# Patient Record
Sex: FEMALE | Race: WHITE | NOT HISPANIC OR LATINO | Employment: PART TIME | URBAN - METROPOLITAN AREA
[De-identification: names, ages, dates, MRNs, and addresses within clinical notes are randomized per-mention and may not be internally consistent; named-entity substitution may affect disease eponyms.]

---

## 2017-01-19 ENCOUNTER — HOSPITAL ENCOUNTER (OUTPATIENT)
Dept: LAB | Facility: MEDICAL CENTER | Age: 32
End: 2017-01-19
Attending: NURSE PRACTITIONER
Payer: COMMERCIAL

## 2017-01-19 LAB — GLUCOSE 1H P 50 G GLC PO SERPL-MCNC: 191 MG/DL (ref 70–139)

## 2017-01-19 PROCEDURE — 36415 COLL VENOUS BLD VENIPUNCTURE: CPT

## 2017-01-19 PROCEDURE — 82950 GLUCOSE TEST: CPT

## 2017-01-28 ENCOUNTER — APPOINTMENT (OUTPATIENT)
Dept: OTHER | Facility: IMAGING CENTER | Age: 32
End: 2017-01-28

## 2017-02-10 ENCOUNTER — NON-PROVIDER VISIT (OUTPATIENT)
Dept: HEALTH INFORMATION MANAGEMENT | Facility: MEDICAL CENTER | Age: 32
End: 2017-02-10
Payer: COMMERCIAL

## 2017-02-10 VITALS — WEIGHT: 199 LBS | HEIGHT: 62 IN | BODY MASS INDEX: 36.62 KG/M2

## 2017-02-10 DIAGNOSIS — O24.419 GESTATIONAL DIABETES MELLITUS (GDM) IN THIRD TRIMESTER, GESTATIONAL DIABETES METHOD OF CONTROL UNSPECIFIED: ICD-10-CM

## 2017-02-10 PROCEDURE — G0109 DIAB MANAGE TRN IND/GROUP: HCPCS | Performed by: PREVENTIVE MEDICINE

## 2017-02-10 NOTE — PROGRESS NOTES
Olinda came to the Gestational Diabetes Program.  She states she has an uncle with Type 2 Diabetes.  She was supplied a Free style Lite meter. She was able to do a return demonstration without difficulty. She will keep walking and stay well hydrated with water. Her meal plan is scanned into Media and her Doctor Letters with what was taught can be found under the Letters tab.

## 2017-02-10 NOTE — MR AVS SNAPSHOT
"        Olinda Pritchardetler   2/10/2017 1:00 PM   Non-Provider Visit   MRN: [id number]    Department:  [facility]   Dept Phone:  [phone]    Description:  Female : 1985   Provider:  GDM CLASS           Reason for Visit     Gestational Diabetes           Allergies as of 2/10/2017     No Known Allergies      You were diagnosed with     Gestational diabetes mellitus (GDM) in third trimester, gestational diabetes method of control unspecified   [[id number]]         Vital Signs     Height Weight Body Mass Index Smoking Status          1.575 m (5' 2\") 90.266 kg (199 lb) 36.39 kg/m2 Never Smoker         Basic Information     Date Of Birth Sex Race Ethnicity Preferred Language    1985 Female White Non- English      Health Maintenance        Date Due Completion Dates    IMM DTaP/Tdap/Td Vaccine (1 - Tdap) 2006 ---    PAP SMEAR 2008 ---    IMM INFLUENZA (1) 2016 ---            Current Immunizations     No immunizations on file.      Below and/or attached are the medications your provider expects you to take. Review all of your home medications and newly ordered medications with your provider and/or pharmacist. Follow medication instructions as directed by your provider and/or pharmacist. Please keep your medication list with you and share with your provider. Update the information when medications are discontinued, doses are changed, or new medications (including over-the-counter products) are added; and carry medication information at all times in the event of emergency situations     Allergies:  No Known Allergies          Medications  Valid as of: February 10, 2017 -  3:18 PM    Generic Name Brand Name Tablet Size Instructions for use    Fexofenadine HCl (Tab) ALLEGRA 60 MG Take 60 mg by mouth every day.        Pseudoephedrine-APAP-DM   Take  by mouth.        .                 Medicines prescribed today were sent to:     [id number] - [place], [place] - [address] AT [facility] " [address] [place] [address]    Phone: [phone] Fax: [phone]    Open 24 Hours?: No      Medication refill instructions:       If your prescription bottle indicates you have medication refills left, it is not necessary to call your provider’s office. Please contact your pharmacy and they will refill your medication.    If your prescription bottle indicates you do not have any refills left, you may request refills at any time through one of the following ways: The online M-Factor system (except Urgent Care), by calling your provider’s office, or by asking your pharmacy to contact your provider’s office with a refill request. Medication refills are processed only during regular business hours and may not be available until the next business day. Your provider may request additional information or to have a follow-up visit with you prior to refilling your medication.   *Please Note: Medication refills are assigned a new Rx number when refilled electronically. Your pharmacy may indicate that no refills were authorized even though a new prescription for the same medication is available at the pharmacy. Please request the medicine by name with the pharmacy before contacting your provider for a refill.           M-Factor Access Code: Activation code not generated  Current M-Factor Status: Active

## 2017-02-10 NOTE — Clinical Note
February 10, 2017                   Re: Olinda Trevino     1985         [id number]       Anna Tellez M.D.  [address]  [address]  [place] [address]      Dear :Anna Tellez M.D.    On 2/10/2017, your patient Olinda Trevino, received 1 hour of nurse training from the Diabetes Center at [facility] for management of her gestational diabetes.  Her  Estimated Date of Delivery: 4/28/17.  We taught the following subjects:    Introduction to gestational diabetes, benefits and responsibilities of patient, physiology of diabetes and the diease process, benefits of blood glucose monitoring and record keeping, medication action and possible side effects, hypoglycemia, sick day management, exercise, stress reduction and travel with diabetes.       Nurse assessment / Education:      Comments:  Edema:no      Weight:Weight: 90.266 kg (199 lb)         Complaints:no      Pathophysiology of diabetes in pregnancy    Discuss  potential maternal and fetal complications in pregnancy with diabetes.     Importance of blood glucose monitoring   Proper testing technique using a Abbott Freestyle Lite meter.    At 1:30 pm, the meter read 95, which was 2.5 hours after eating.  Testing: fasting and one hour after meals,  expected ranges and rationale for strict control.     Ketone test today:no       Recognition and treatment of hypoglycemia.     Insulin taught: No  Insulin briefly dicussed at this time.    Should patient require insulin later in pregnancy, she would need further education.     Reviewed fetal kick counts and other tests to determine fetal well-being  Discuss benefits and risks of exercise in pregnancy  Discuss when to call Doctor  Discuss sick day care  Importance of wearing diabetes identification      Patient/caregiver appeared to understand the content as demonstrated by appropriate questions.     Olinda Trevino was encouraged to discuss this further with you.    Hopefully this will help in  your management of her care.  If we can be of further assistance, please feel free to call.    Thank you for the referral.    Sincerely,      Camelia Carver RN CDE  GDM CLASS  Certified Diabetes Educator

## 2017-02-10 NOTE — Clinical Note
February 10, 2017                   Re: Olinda Trevino    1985         [id number]       Anna Tellez M.D.  [address]  [place] [address]      Dear :Anna Tellez M.D.    On 2/10/2017, your patient Olinda Trevino, received 1.5 hours of nutrition training from the Diabetes Center at [facility] for management of her gestational diabetes.  Her EDC is:  4/28/17 .  We taught the following subjects:    Patient provided 1800 calorie meal plan with 3 meals and 3 snacks.   175 grams carbohydrate,   115 grams protein,   75 grams fat  Importance of meal planning in diabetes management during pregnancy  Importance of consistent timing of meals and snacks and agreed upon times  Avoidance of simple carbohydrates  Metabolism of food components relating to pregnancy  Identification of foods in food groups  Patient demonstrates adequate ability to utilize meal planning manual for reference  Plan 3 meals and 3 snacks with 90% accuracy  Review basic principles of eating out  Reviewed precautions with artificial sweeteners  Comments:  Olinda agreed to follow the meal plan and to eat at the times agreed upon.  She will check blood glucose 4 times a day and record the values in her log book.  She will follow up in your office.    Hopefully this will help in your management of her care.  If we can be of further assistance, please feel free to call.    Thank you for the referral.    Sincerely,  Cathy Queen RD, CDE  Certified Diabetes Educator

## 2017-03-28 ENCOUNTER — HOSPITAL ENCOUNTER (OUTPATIENT)
Dept: LAB | Facility: MEDICAL CENTER | Age: 32
End: 2017-03-28
Attending: OBSTETRICS & GYNECOLOGY
Payer: COMMERCIAL

## 2017-03-28 PROCEDURE — 87653 STREP B DNA AMP PROBE: CPT

## 2017-03-30 LAB — GP B STREP DNA SPEC QL NAA+PROBE: NEGATIVE

## 2017-04-11 ENCOUNTER — HOSPITAL ENCOUNTER (OUTPATIENT)
Facility: MEDICAL CENTER | Age: 32
End: 2017-04-11
Attending: OBSTETRICS & GYNECOLOGY | Admitting: OBSTETRICS & GYNECOLOGY
Payer: COMMERCIAL

## 2017-04-11 VITALS
BODY MASS INDEX: 37.54 KG/M2 | TEMPERATURE: 97.8 F | HEART RATE: 93 BPM | DIASTOLIC BLOOD PRESSURE: 90 MMHG | WEIGHT: 204 LBS | HEIGHT: 62 IN | SYSTOLIC BLOOD PRESSURE: 131 MMHG | RESPIRATION RATE: 16 BRPM

## 2017-04-11 VITALS — HEART RATE: 112 BPM | SYSTOLIC BLOOD PRESSURE: 135 MMHG | DIASTOLIC BLOOD PRESSURE: 76 MMHG

## 2017-04-11 LAB
ALBUMIN SERPL BCP-MCNC: 3.6 G/DL (ref 3.2–4.9)
ALBUMIN/GLOB SERPL: 1.1 G/DL
ALP SERPL-CCNC: 106 U/L (ref 30–99)
ALT SERPL-CCNC: 6 U/L (ref 2–50)
ANION GAP SERPL CALC-SCNC: 10 MMOL/L (ref 0–11.9)
AST SERPL-CCNC: 7 U/L (ref 12–45)
BASOPHILS # BLD AUTO: 0.3 % (ref 0–1.8)
BASOPHILS # BLD: 0.03 K/UL (ref 0–0.12)
BILIRUB SERPL-MCNC: 0.3 MG/DL (ref 0.1–1.5)
BUN SERPL-MCNC: 10 MG/DL (ref 8–22)
CALCIUM SERPL-MCNC: 9.2 MG/DL (ref 8.5–10.5)
CHLORIDE SERPL-SCNC: 105 MMOL/L (ref 96–112)
CO2 SERPL-SCNC: 19 MMOL/L (ref 20–33)
CREAT SERPL-MCNC: 0.41 MG/DL (ref 0.5–1.4)
CREAT UR-MCNC: 46.6 MG/DL
EOSINOPHIL # BLD AUTO: 0.1 K/UL (ref 0–0.51)
EOSINOPHIL NFR BLD: 1 % (ref 0–6.9)
ERYTHROCYTE [DISTWIDTH] IN BLOOD BY AUTOMATED COUNT: 42.8 FL (ref 35.9–50)
GFR SERPL CREATININE-BSD FRML MDRD: >60 ML/MIN/1.73 M 2
GLOBULIN SER CALC-MCNC: 3.2 G/DL (ref 1.9–3.5)
GLUCOSE BLD-MCNC: 82 MG/DL (ref 65–99)
GLUCOSE SERPL-MCNC: 94 MG/DL (ref 65–99)
HCT VFR BLD AUTO: 38.2 % (ref 37–47)
HGB BLD-MCNC: 12.5 G/DL (ref 12–16)
IMM GRANULOCYTES # BLD AUTO: 0.09 K/UL (ref 0–0.11)
IMM GRANULOCYTES NFR BLD AUTO: 0.9 % (ref 0–0.9)
LYMPHOCYTES # BLD AUTO: 1.3 K/UL (ref 1–4.8)
LYMPHOCYTES NFR BLD: 13.6 % (ref 22–41)
MCH RBC QN AUTO: 27.8 PG (ref 27–33)
MCHC RBC AUTO-ENTMCNC: 32.7 G/DL (ref 33.6–35)
MCV RBC AUTO: 85.1 FL (ref 81.4–97.8)
MONOCYTES # BLD AUTO: 0.6 K/UL (ref 0–0.85)
MONOCYTES NFR BLD AUTO: 6.3 % (ref 0–13.4)
NEUTROPHILS # BLD AUTO: 7.45 K/UL (ref 2–7.15)
NEUTROPHILS NFR BLD: 77.9 % (ref 44–72)
NRBC # BLD AUTO: 0 K/UL
NRBC BLD AUTO-RTO: 0 /100 WBC
PLATELET # BLD AUTO: 296 K/UL (ref 164–446)
PMV BLD AUTO: 9.4 FL (ref 9–12.9)
POTASSIUM SERPL-SCNC: 3.8 MMOL/L (ref 3.6–5.5)
PROT SERPL-MCNC: 6.8 G/DL (ref 6–8.2)
PROT UR-MCNC: 30 MG/DL (ref 0–15)
PROT/CREAT UR: 644 MG/G (ref 10–107)
RBC # BLD AUTO: 4.49 M/UL (ref 4.2–5.4)
SODIUM SERPL-SCNC: 134 MMOL/L (ref 135–145)
URATE SERPL-MCNC: 6.3 MG/DL (ref 1.9–8.2)
WBC # BLD AUTO: 9.6 K/UL (ref 4.8–10.8)

## 2017-04-11 PROCEDURE — 59025 FETAL NON-STRESS TEST: CPT | Performed by: OBSTETRICS & GYNECOLOGY

## 2017-04-11 PROCEDURE — 36415 COLL VENOUS BLD VENIPUNCTURE: CPT

## 2017-04-11 PROCEDURE — 80053 COMPREHEN METABOLIC PANEL: CPT

## 2017-04-11 PROCEDURE — 85025 COMPLETE CBC W/AUTO DIFF WBC: CPT

## 2017-04-11 PROCEDURE — 81050 URINALYSIS VOLUME MEASURE: CPT

## 2017-04-11 PROCEDURE — 82962 GLUCOSE BLOOD TEST: CPT

## 2017-04-11 PROCEDURE — 84156 ASSAY OF PROTEIN URINE: CPT | Mod: 91

## 2017-04-11 PROCEDURE — 82570 ASSAY OF URINE CREATININE: CPT

## 2017-04-11 PROCEDURE — 84550 ASSAY OF BLOOD/URIC ACID: CPT

## 2017-04-11 ASSESSMENT — PAIN SCALES - GENERAL
PAINLEVEL_OUTOF10: 5
PAINLEVEL_OUTOF10: 5

## 2017-04-11 NOTE — PROGRESS NOTES
31 y.o. , EDC 17=37w4d here to L&D for PIH workup and blood glucose test.     1130- Pt to S220, external monitors applied, /84, blood drawn by lab. POC discussed. Sister-in-law and mother at bedside. Reports some pain with ctx, every 10 minutes, some dizziness. Denies epigastric pain. Some swelling of hands and feet.    1200- Reactive NST obtained.     1330- SVE closed, see flowsheets.    1348- Call to Dr. Tellez, updated on pt's lab results, total protein 30 and protein/creatinine ration 644. Orders to discharge home with 24 hour urine collection.     1415- D/c orders discussed with pt and family including s/s labor, fetal kick counts, and preeclampsia precautions. Follow up with scheduled apt tomorrow with Dr. Laird. Return tomorrow at 1240 with urine collection and printed lab order. Pt expresses understanding. D/c ambulatory off unit.

## 2017-04-12 PROCEDURE — 84156 ASSAY OF PROTEIN URINE: CPT

## 2017-04-12 PROCEDURE — 81050 URINALYSIS VOLUME MEASURE: CPT

## 2017-04-12 NOTE — PROGRESS NOTES
2050  Pt into room 217 c/o having increased UC pain since she was here earlier today.  Pt has report of discharge from prior exams coming out, has no report of leaking water though.  Pt reports positive fetal movement.  Monitors placed and SVE.  POC discussed and will recheck cervix in one hour.  2150  Repeat SVE with no cervical change, POC discussed and Dr. Stein phoned at this time.  2230  Return phone from Dr. Stein and orders for DC at this time.  24 Hour urine started again at 2030 since pt states that she messed up the one at home from earlier.  Pt understands to return the urine after 2030 tomorrow.  Pt into regular clothing and to home at this time.

## 2017-04-13 ENCOUNTER — HOSPITAL ENCOUNTER (INPATIENT)
Facility: MEDICAL CENTER | Age: 32
LOS: 2 days | End: 2017-04-15
Attending: OBSTETRICS & GYNECOLOGY | Admitting: OBSTETRICS & GYNECOLOGY
Payer: COMMERCIAL

## 2017-04-13 LAB
ALBUMIN SERPL BCP-MCNC: 3.5 G/DL (ref 3.2–4.9)
ALBUMIN/GLOB SERPL: 1 G/DL
ALP SERPL-CCNC: 116 U/L (ref 30–99)
ALT SERPL-CCNC: 6 U/L (ref 2–50)
ANION GAP SERPL CALC-SCNC: 10 MMOL/L (ref 0–11.9)
AST SERPL-CCNC: 13 U/L (ref 12–45)
BASOPHILS # BLD AUTO: 0.5 % (ref 0–1.8)
BASOPHILS # BLD: 0.04 K/UL (ref 0–0.12)
BILIRUB SERPL-MCNC: 0.3 MG/DL (ref 0.1–1.5)
BUN SERPL-MCNC: 10 MG/DL (ref 8–22)
CALCIUM SERPL-MCNC: 9.1 MG/DL (ref 8.5–10.5)
CHLORIDE SERPL-SCNC: 106 MMOL/L (ref 96–112)
CO2 SERPL-SCNC: 18 MMOL/L (ref 20–33)
CREAT SERPL-MCNC: 0.42 MG/DL (ref 0.5–1.4)
EOSINOPHIL # BLD AUTO: 0.07 K/UL (ref 0–0.51)
EOSINOPHIL NFR BLD: 0.8 % (ref 0–6.9)
ERYTHROCYTE [DISTWIDTH] IN BLOOD BY AUTOMATED COUNT: 43.8 FL (ref 35.9–50)
GFR SERPL CREATININE-BSD FRML MDRD: >60 ML/MIN/1.73 M 2
GLOBULIN SER CALC-MCNC: 3.4 G/DL (ref 1.9–3.5)
GLUCOSE SERPL-MCNC: 68 MG/DL (ref 65–99)
HCT VFR BLD AUTO: 37.4 % (ref 37–47)
HGB BLD-MCNC: 12.6 G/DL (ref 12–16)
HOLDING TUBE BB 8507: NORMAL
IMM GRANULOCYTES # BLD AUTO: 0.07 K/UL (ref 0–0.11)
IMM GRANULOCYTES NFR BLD AUTO: 0.8 % (ref 0–0.9)
LYMPHOCYTES # BLD AUTO: 1.39 K/UL (ref 1–4.8)
LYMPHOCYTES NFR BLD: 16.1 % (ref 22–41)
MCH RBC QN AUTO: 28.5 PG (ref 27–33)
MCHC RBC AUTO-ENTMCNC: 33.7 G/DL (ref 33.6–35)
MCV RBC AUTO: 84.6 FL (ref 81.4–97.8)
MONOCYTES # BLD AUTO: 0.67 K/UL (ref 0–0.85)
MONOCYTES NFR BLD AUTO: 7.8 % (ref 0–13.4)
NEUTROPHILS # BLD AUTO: 6.37 K/UL (ref 2–7.15)
NEUTROPHILS NFR BLD: 74 % (ref 44–72)
NRBC # BLD AUTO: 0 K/UL
NRBC BLD AUTO-RTO: 0 /100 WBC
PLATELET # BLD AUTO: 320 K/UL (ref 164–446)
PMV BLD AUTO: 9.7 FL (ref 9–12.9)
POTASSIUM SERPL-SCNC: 3.8 MMOL/L (ref 3.6–5.5)
PROT 24H UR-MCNC: 553 MG/24 HR (ref 30–150)
PROT 24H UR-MRATE: 31.6 MG/DL (ref 0–15)
PROT SERPL-MCNC: 6.9 G/DL (ref 6–8.2)
RBC # BLD AUTO: 4.42 M/UL (ref 4.2–5.4)
SODIUM SERPL-SCNC: 134 MMOL/L (ref 135–145)
SPECIMEN VOL UR: 1750 ML
URATE SERPL-MCNC: 5.9 MG/DL (ref 1.9–8.2)
WBC # BLD AUTO: 8.6 K/UL (ref 4.8–10.8)

## 2017-04-13 PROCEDURE — 85025 COMPLETE CBC W/AUTO DIFF WBC: CPT

## 2017-04-13 PROCEDURE — 700111 HCHG RX REV CODE 636 W/ 250 OVERRIDE (IP): Performed by: OBSTETRICS & GYNECOLOGY

## 2017-04-13 PROCEDURE — 4A1H74Z MONITORING OF PRODUCTS OF CONCEPTION, CARDIAC ELECTRICAL ACTIVITY, VIA NATURAL OR ARTIFICIAL OPENING: ICD-10-PCS | Performed by: OBSTETRICS & GYNECOLOGY

## 2017-04-13 PROCEDURE — 700111 HCHG RX REV CODE 636 W/ 250 OVERRIDE (IP)

## 2017-04-13 PROCEDURE — 84550 ASSAY OF BLOOD/URIC ACID: CPT

## 2017-04-13 PROCEDURE — 10H07YZ INSERTION OF OTHER DEVICE INTO PRODUCTS OF CONCEPTION, VIA NATURAL OR ARTIFICIAL OPENING: ICD-10-PCS | Performed by: OBSTETRICS & GYNECOLOGY

## 2017-04-13 PROCEDURE — 770002 HCHG ROOM/CARE - OB PRIVATE (112)

## 2017-04-13 PROCEDURE — 10907ZC DRAINAGE OF AMNIOTIC FLUID, THERAPEUTIC FROM PRODUCTS OF CONCEPTION, VIA NATURAL OR ARTIFICIAL OPENING: ICD-10-PCS | Performed by: OBSTETRICS & GYNECOLOGY

## 2017-04-13 PROCEDURE — 80053 COMPREHEN METABOLIC PANEL: CPT

## 2017-04-13 RX ORDER — SODIUM CHLORIDE, SODIUM LACTATE, POTASSIUM CHLORIDE, CALCIUM CHLORIDE 600; 310; 30; 20 MG/100ML; MG/100ML; MG/100ML; MG/100ML
INJECTION, SOLUTION INTRAVENOUS
Status: COMPLETED
Start: 2017-04-13 | End: 2017-04-13

## 2017-04-13 RX ORDER — LORATADINE 10 MG/1
10 TABLET ORAL DAILY
Status: ON HOLD | COMMUNITY
End: 2017-04-15

## 2017-04-13 RX ORDER — MISOPROSTOL 200 UG/1
800 TABLET ORAL
Status: DISCONTINUED | OUTPATIENT
Start: 2017-04-13 | End: 2017-04-14 | Stop reason: HOSPADM

## 2017-04-13 RX ORDER — SODIUM CHLORIDE, SODIUM LACTATE, POTASSIUM CHLORIDE, CALCIUM CHLORIDE 600; 310; 30; 20 MG/100ML; MG/100ML; MG/100ML; MG/100ML
INJECTION, SOLUTION INTRAVENOUS CONTINUOUS
Status: DISPENSED | OUTPATIENT
Start: 2017-04-13 | End: 2017-04-14

## 2017-04-13 RX ADMIN — SODIUM CHLORIDE, POTASSIUM CHLORIDE, SODIUM LACTATE AND CALCIUM CHLORIDE: 600; 310; 30; 20 INJECTION, SOLUTION INTRAVENOUS at 22:25

## 2017-04-13 RX ADMIN — SODIUM CHLORIDE, POTASSIUM CHLORIDE, SODIUM LACTATE AND CALCIUM CHLORIDE: 600; 310; 30; 20 INJECTION, SOLUTION INTRAVENOUS at 15:00

## 2017-04-13 ASSESSMENT — LIFESTYLE VARIABLES
ALCOHOL_USE: NO
DO YOU DRINK ALCOHOL: NO
EVER_SMOKED: NEVER

## 2017-04-13 ASSESSMENT — PAIN SCALES - GENERAL
PAINLEVEL_OUTOF10: 5
PAINLEVEL_OUTOF10: 5

## 2017-04-13 NOTE — PROGRESS NOTES
1415- Pt sent over from Dr. Laird's office for IOL per the patient, monitors placed, bp taken.  Dr. Tellez called, order given to admit and she will be by around 1600 to decide what type of induction is needed. Sve 1/50/-2  1500- IV placed, admit complete  1755-Dr. Tellez at bedside Cervical ripening balloon placed 60 ml in internal and external balloon  1900- Bedside report given to Janey SOTO- poc discussed

## 2017-04-13 NOTE — IP AVS SNAPSHOT
4/15/2017    Olinda MCGILL Belinda  [address] [place] [address]    Dear Olinda:    [facility] wants to ensure your discharge home is safe and you or your loved ones have had all of your questions answered regarding your care after you leave the hospital.    Below is a list of resources and contact information should you have any questions regarding your hospital stay, follow-up instructions, or active medical symptoms.    Questions or Concerns Regarding… Contact   Medical Questions Related to Your Discharge  (7 days a week, 8am-5pm) Contact a Nurse Care Coordinator   [phone]   Medical Questions Not Related to Your Discharge  (24 hours a day / 7 days a week)  Contact the Nurse Health Line   [phone]    Medications or Discharge Instructions Refer to your discharge packet   or contact your [facility] Primary Care Provider   [phone]   Follow-up Appointment(s) Schedule your appointment via Procyrion   or contact Scheduling [phone]   Billing Review your statement via Procyrion  or contact Billing [phone]   Medical Records Review your records via Procyrion   or contact Medical Records [phone]     You may receive a telephone call within two days of discharge. This call is to make certain you understand your discharge instructions and have the opportunity to have any questions answered. You can also easily access your medical information, test results and upcoming appointments via the Procyrion free online health management tool. You can learn more and sign up at [employer]/Procyrion. For assistance setting up your Procyrion account, please call [phone].    Once again, we want to ensure your discharge home is safe and that you have a clear understanding of any next steps in your care. If you have any questions or concerns, please do not hesitate to contact us, we are here for you. Thank you for choosing [facility] for your healthcare needs.    Sincerely,    Your [facility] Healthcare Team

## 2017-04-13 NOTE — IP AVS SNAPSHOT
Home Care Instructions                                                                                                                Olinda Trevino   MRN: [id number]    Department:  POST PARTUM 31   2017           Follow-up Information     1. Follow up with Anna Tellez M.D.. Schedule an appointment as soon as possible for a visit in 5 weeks.    Specialty:  OB/Gyn    Contact information    [address]  [address]  [place] [address]  [phone]         I assume responsibility for securing a follow-up  Screening blood test on my baby within the specified date range.    -                  Discharge Instructions       POSTPARTUM DISCHARGE INSTRUCTIONS FOR MOM    YOB: 1985   Age: 31 y.o.               Admit Date: 2017     Discharge Date: 4/15/2017  Attending Doctor:  Anna Tellez M.D.                  Allergies:  Cefdinir    Discharged to home by car. Discharged via wheelchair, hospital escort: Yes.  Special equipment needed: Not Applicable  Belongings with: Personal  Be sure to schedule a follow-up appointment with your primary care doctor or any specialists as instructed.     Discharge Plan:   Diet Plan: Discussed  Activity Level: Discussed  Confirmed Follow up Appointment: Patient to Call and Schedule Appointment  Confirmed Symptoms Management: Discussed  Medication Reconciliation Updated: Yes  Influenza Vaccine Indication: Not indicated: Previously immunized this influenza season and > 8 years of age    REASONS TO CALL YOUR OBSTETRICIAN:  1.   Persistent fever or shaking chills (Temperature higher than 100.4)  2.   Heavy bleeding (soaking more than 1 pad per hour); Passing clots  3.   Foul odor from vagina  4.   Mastitis (Breast infection; breast pain, chills, fever, redness)  5.   Urinary pain, burning or frequency  6.   Episiotomy infection    8.   Severe depression longer than 24 hours    HAND WASHING  · Prior to handling the baby.  · Before breastfeeding or bottle  "feeding baby.  · After using the bathroom or changing the baby's diaper.    VAGINAL CARE  · Nothing inside vagina for 6 weeks: no sexual intercourse, tampons or douching.  · Bleeding may continue for 2-4 weeks.  Amount may vary.    · Call your physician for heavy bleeding which means soaking more than 1 pad per hour    BIRTH CONTROL  · It is possible to become pregnant at any time after delivery and while breastfeeding.  · Plan to discuss a method of birth control with your physician at your follow up visit. visit.    DIET AND ELIMINATION  · Eating more fiber (bran cereal, fruits, and vegetables) and drinking plenty of fluids will help to avoid constipation.  · Urinary frequency after childbirth is normal.    POSTPARTUM BLUES  During the first few days after birth, you may experience a sense of the \"blues\" which may include impatience, irritability or even crying.  These feeling come and go quickly.  However, as many as 1 in 10 women experience emotional symptoms known as postpartum depression.    Postpartum depression:  May start as early as the second or third day after delivery or take several weeks or months to develop.  Symptoms of \"blues\" are present, but are more intense:  Crying spells; loss of appetite; feelings of hopelessness or loss of control; fear of touching the baby; over concern or no concern at all about the baby; little or no concern about your own appearance/caring for yourself; and/or inability to sleep or excessive sleeping.  Contact your physician if you are experiencing any of these symptoms.    Crisis Hotline:  · [place] Crisis Hotline:  7-854-BEVQBUH  Or 1-146.142.5200  · [place] Crisis Hotline:  1-982.610.9133  Or 044-065-6943    PREVENTING SHAKEN BABY:  If you are angry or stressed, PUT THE BABY IN THE CRIB, step away, take some deep breaths, and wait until you are calm to care for the baby.  DO NOT SHAKE THE BABY.  You are not alone, call a supporter for help.    · Crisis Call Center " "24/7 crisis line 300-217-5865 or 1-598.937.1982  · You can also text them, text \"ANSWER\" to 332283    QUIT SMOKING/TOBACCO USE:  I understand the use of any tobacco products increases my chance of suffering from future heart disease and could cause other illnesses which may shorten my life. Quitting the use of tobacco products is the single most important thing I can do to improve my health. For further information on smoking / tobacco cessation call a Toll Free Quit Line at 1-342.553.9446 (*National Cancer [place]) or 1-907.154.7402 (American Lung Association) or you can access the web based program at www.lungusa.org.    · [place] Tobacco Users Help Line:  (296) 668-7305       Toll Free: 1-214.290.1860  · Quit Tobacco Program [facility] (765)413-2633    DEPRESSION / SUICIDE RISK:  As you are discharged from this [facility] [[email or web address]], it is important to learn how to keep safe from harming yourself.    Recognize the warning signs:  · Abrupt changes in personality, positive or negative- including increase in energy   · Giving away possessions  · Change in eating patterns- significant weight changes-  positive or negative  · Change in sleeping patterns- unable to sleep or sleeping all the time   · Unwillingness or inability to communicate  · Depression  · Unusual sadness, discouragement and loneliness  · Talk of wanting to die  · Neglect of personal appearance   · Rebelliousness- reckless behavior  · Withdrawal from people/activities they love  · Confusion- inability to concentrate     If you or a loved one observes any of these behaviors or has concerns about self-harm, here's what you can do:  · Talk about it- your feelings and reasons for harming yourself  · Remove any means that you might use to hurt yourself (examples: pills, rope, extension cords, firearm)  · Get professional help from the community (Mental Health, Substance Abuse, psychological counseling)  · Do not be alone:Call your " Safe Contact- someone whom you trust who will be there for you.  · Call your local CRISIS HOTLINE 720-3331 or 164-669-2453  · Call your local Children's Mobile Crisis Response Team [place] (613) 826-3192 or www.Exosite  · Call the toll free National Suicide Prevention Hotlines   · National Suicide Prevention Lifeline 013-034-IPKL (9058)  · National Hope Line Network 800-SUICIDE (029-6600)    DISCHARGE SURVEY:  Thank you for choosing [facility].  We hope we provided you with very good care.  You may be receiving a survey in the mail.  Please fill it out.  Your opinion is valuable to us.    ADDITIONAL EDUCATIONAL MATERIALS GIVEN TO PATIENT:        My signature on this form indicates that:  1.  I have reviewed and understand the above information  2.  My questions regarding this information have been answered to my satisfaction.  3.  I have formulated a plan with my discharge nurse to obtain my prescribed medication for home.         Discharge Medication Instructions:    Below are the medications your physician expects you to take upon discharge:    Review all your home medications and newly ordered medications with your doctor and/or pharmacist. Follow medication instructions as directed by your doctor and/or pharmacist.    Please keep your medication list with you and share with your physician.               Medication List      START taking these medications        Instructions    ibuprofen 600 MG Tabs   Last time this was given:  600 mg on 4/15/2017  9:30 PM   Commonly known as:  MOTRIN    Take 1 Tab by mouth every 6 hours as needed (cramping after delivery; do not give if patient is receiving ketorolac (Toradol)).   Dose:  600 mg       oxycodone-acetaminophen 5-325 MG Tabs   Last time this was given:  1 Tab on 4/15/2017  9:30 PM   Commonly known as:  PERCOCET    Take 1 Tab by mouth every four hours as needed (for Moderate Pain (Pain Scale 4-6) after delivery).   Dose:  1 Tab         CONTINUE  taking these medications        Instructions    PRENATAL 1 PO    Take  by mouth.         STOP taking these medications     loratadine 10 MG Tabs   Commonly known as:  CLARITIN               Crisis Hotline:     [place] Crisis Hotline:  6-325-QZKFKRW or 1-409.674.1224    [place] Crisis Hotline:    1-466.324.2506 or 976-297-1030        Disclaimer           _____________________________________                     __________       ________       Patient/Mother Signature or Legal                          Date                   Time

## 2017-04-13 NOTE — IP AVS SNAPSHOT
Quantance Access Code: Activation code not generated  Current Quantance Status: Active    [facility]  A secure, online tool to manage your health information     [employer]’s Quantance® is a secure, online tool that connects you to your personalized health information from the privacy of your home -- day or night - making it very easy for you to manage your healthcare. Once the activation process is completed, you can even access your medical information using the Quantance denilson, which is available for free in the Apple Denilson store or Google Play store.     Quantance provides the following levels of access (as shown below):   My Chart Features   [facility] Primary Care Doctor [facility]  Specialists [facility]  Urgent  Care Non-[facility]  Primary Care  Doctor   Email your healthcare team securely and privately 24/7 X X X X   Manage appointments: schedule your next appointment; view details of past/upcoming appointments X      Request prescription refills. X      View recent personal medical records, including lab and immunizations X X X X   View health record, including health history, allergies, medications X X X X   Read reports about your outpatient visits, procedures, consult and ER notes X X X X   See your discharge summary, which is a recap of your hospital and/or ER visit that includes your diagnosis, lab results, and care plan. X X       How to register for Quantance:  1. Go to  [email or web address].  2. Click on the Sign Up Now box, which takes you to the New Member Sign Up page. You will need to provide the following information:  a. Enter your Quantance Access Code exactly as it appears at the top of this page. (You will not need to use this code after you’ve completed the sign-up process. If you do not sign up before the expiration date, you must request a new code.)   b. Enter your date of birth.   c. Enter your home email address.   d. Click Submit, and follow the next screen’s instructions.  3. Create a Quantance ID. This will  be your [employer] login ID and cannot be changed, so think of one that is secure and easy to remember.  4. Create a [employer] password. You can change your password at any time.  5. Enter your Password Reset Question and Answer. This can be used at a later time if you forget your password.   6. Enter your e-mail address. This allows you to receive e-mail notifications when new information is available in [employer].  7. Click Sign Up. You can now view your health information.    For assistance activating your [employer] account, call [phone]

## 2017-04-14 LAB
GLUCOSE BLD-MCNC: 152 MG/DL (ref 65–99)
GLUCOSE BLD-MCNC: 82 MG/DL (ref 65–99)

## 2017-04-14 PROCEDURE — 36415 COLL VENOUS BLD VENIPUNCTURE: CPT

## 2017-04-14 PROCEDURE — 700111 HCHG RX REV CODE 636 W/ 250 OVERRIDE (IP): Performed by: OBSTETRICS & GYNECOLOGY

## 2017-04-14 PROCEDURE — 303615 HCHG EPIDURAL/SPINAL ANESTHESIA FOR LABOR

## 2017-04-14 PROCEDURE — 304965 HCHG RECOVERY SERVICES

## 2017-04-14 PROCEDURE — A9270 NON-COVERED ITEM OR SERVICE: HCPCS | Performed by: OBSTETRICS & GYNECOLOGY

## 2017-04-14 PROCEDURE — 700111 HCHG RX REV CODE 636 W/ 250 OVERRIDE (IP)

## 2017-04-14 PROCEDURE — 82962 GLUCOSE BLOOD TEST: CPT

## 2017-04-14 PROCEDURE — 770002 HCHG ROOM/CARE - OB PRIVATE (112)

## 2017-04-14 PROCEDURE — 700101 HCHG RX REV CODE 250

## 2017-04-14 PROCEDURE — 0HQ9XZZ REPAIR PERINEUM SKIN, EXTERNAL APPROACH: ICD-10-PCS | Performed by: OBSTETRICS & GYNECOLOGY

## 2017-04-14 PROCEDURE — 700102 HCHG RX REV CODE 250 W/ 637 OVERRIDE(OP): Performed by: OBSTETRICS & GYNECOLOGY

## 2017-04-14 PROCEDURE — 59409 OBSTETRICAL CARE: CPT

## 2017-04-14 RX ORDER — SODIUM CHLORIDE, SODIUM LACTATE, POTASSIUM CHLORIDE, CALCIUM CHLORIDE 600; 310; 30; 20 MG/100ML; MG/100ML; MG/100ML; MG/100ML
INJECTION, SOLUTION INTRAVENOUS PRN
Status: DISCONTINUED | OUTPATIENT
Start: 2017-04-14 | End: 2017-04-16 | Stop reason: HOSPADM

## 2017-04-14 RX ORDER — LIDOCAINE HCL/EPINEPHRINE/PF 2%-1:200K
VIAL (ML) INJECTION
Status: ACTIVE
Start: 2017-04-14 | End: 2017-04-14

## 2017-04-14 RX ORDER — MISOPROSTOL 200 UG/1
600 TABLET ORAL
Status: DISCONTINUED | OUTPATIENT
Start: 2017-04-14 | End: 2017-04-16 | Stop reason: HOSPADM

## 2017-04-14 RX ORDER — SODIUM CHLORIDE, SODIUM LACTATE, POTASSIUM CHLORIDE, CALCIUM CHLORIDE 600; 310; 30; 20 MG/100ML; MG/100ML; MG/100ML; MG/100ML
INJECTION, SOLUTION INTRAVENOUS
Status: ACTIVE
Start: 2017-04-14 | End: 2017-04-14

## 2017-04-14 RX ORDER — DEXTROSE, SODIUM CHLORIDE, SODIUM LACTATE, POTASSIUM CHLORIDE, AND CALCIUM CHLORIDE 5; .6; .31; .03; .02 G/100ML; G/100ML; G/100ML; G/100ML; G/100ML
INJECTION, SOLUTION INTRAVENOUS CONTINUOUS
Status: DISCONTINUED | OUTPATIENT
Start: 2017-04-14 | End: 2017-04-16 | Stop reason: HOSPADM

## 2017-04-14 RX ORDER — ONDANSETRON 2 MG/ML
4 INJECTION INTRAMUSCULAR; INTRAVENOUS EVERY 4 HOURS PRN
Status: DISCONTINUED | OUTPATIENT
Start: 2017-04-14 | End: 2017-04-16 | Stop reason: HOSPADM

## 2017-04-14 RX ORDER — ROPIVACAINE HYDROCHLORIDE 2 MG/ML
INJECTION, SOLUTION EPIDURAL; INFILTRATION; PERINEURAL
Status: COMPLETED
Start: 2017-04-14 | End: 2017-04-14

## 2017-04-14 RX ORDER — OXYCODONE AND ACETAMINOPHEN 10; 325 MG/1; MG/1
1 TABLET ORAL EVERY 4 HOURS PRN
Status: DISCONTINUED | OUTPATIENT
Start: 2017-04-14 | End: 2017-04-16 | Stop reason: HOSPADM

## 2017-04-14 RX ORDER — IBUPROFEN 600 MG/1
600 TABLET ORAL EVERY 6 HOURS PRN
Status: DISCONTINUED | OUTPATIENT
Start: 2017-04-14 | End: 2017-04-16 | Stop reason: HOSPADM

## 2017-04-14 RX ORDER — OXYCODONE AND ACETAMINOPHEN 10; 325 MG/1; MG/1
1 TABLET ORAL EVERY 4 HOURS PRN
Status: DISCONTINUED | OUTPATIENT
Start: 2017-04-14 | End: 2017-04-14

## 2017-04-14 RX ORDER — SODIUM CHLORIDE, SODIUM LACTATE, POTASSIUM CHLORIDE, CALCIUM CHLORIDE 600; 310; 30; 20 MG/100ML; MG/100ML; MG/100ML; MG/100ML
INJECTION, SOLUTION INTRAVENOUS
Status: COMPLETED
Start: 2017-04-14 | End: 2017-04-14

## 2017-04-14 RX ORDER — VITAMIN A ACETATE, BETA CAROTENE, ASCORBIC ACID, CHOLECALCIFEROL, .ALPHA.-TOCOPHEROL ACETATE, DL-, THIAMINE MONONITRATE, RIBOFLAVIN, NIACINAMIDE, PYRIDOXINE HYDROCHLORIDE, FOLIC ACID, CYANOCOBALAMIN, CALCIUM CARBONATE, FERROUS FUMARATE, ZINC OXIDE, CUPRIC OXIDE 3080; 12; 120; 400; 1; 1.84; 3; 20; 22; 920; 25; 200; 27; 10; 2 [IU]/1; UG/1; MG/1; [IU]/1; MG/1; MG/1; MG/1; MG/1; MG/1; [IU]/1; MG/1; MG/1; MG/1; MG/1; MG/1
1 TABLET, FILM COATED ORAL EVERY MORNING
Status: DISCONTINUED | OUTPATIENT
Start: 2017-04-15 | End: 2017-04-16 | Stop reason: HOSPADM

## 2017-04-14 RX ORDER — OXYCODONE HYDROCHLORIDE AND ACETAMINOPHEN 5; 325 MG/1; MG/1
1 TABLET ORAL EVERY 4 HOURS PRN
Status: DISCONTINUED | OUTPATIENT
Start: 2017-04-14 | End: 2017-04-16 | Stop reason: HOSPADM

## 2017-04-14 RX ORDER — DOCUSATE SODIUM 100 MG/1
100 CAPSULE, LIQUID FILLED ORAL 2 TIMES DAILY PRN
Status: DISCONTINUED | OUTPATIENT
Start: 2017-04-14 | End: 2017-04-16 | Stop reason: HOSPADM

## 2017-04-14 RX ORDER — SODIUM CHLORIDE, SODIUM LACTATE, POTASSIUM CHLORIDE, CALCIUM CHLORIDE 600; 310; 30; 20 MG/100ML; MG/100ML; MG/100ML; MG/100ML
INJECTION, SOLUTION INTRAVENOUS
Status: ACTIVE
Start: 2017-04-14 | End: 2017-04-15

## 2017-04-14 RX ORDER — IBUPROFEN 600 MG/1
600 TABLET ORAL EVERY 6 HOURS PRN
Status: DISCONTINUED | OUTPATIENT
Start: 2017-04-14 | End: 2017-04-14

## 2017-04-14 RX ADMIN — IBUPROFEN 600 MG: 600 TABLET, FILM COATED ORAL at 19:17

## 2017-04-14 RX ADMIN — Medication 41.67 MILLI-UNITS/MIN: at 19:40

## 2017-04-14 RX ADMIN — FENTANYL CITRATE 50 MCG: 50 INJECTION INTRAMUSCULAR; INTRAVENOUS at 09:05

## 2017-04-14 RX ADMIN — OXYCODONE HYDROCHLORIDE AND ACETAMINOPHEN 1 TABLET: 5; 325 TABLET ORAL at 19:18

## 2017-04-14 RX ADMIN — Medication 2 MILLI-UNITS/MIN: at 03:58

## 2017-04-14 RX ADMIN — SODIUM CHLORIDE, POTASSIUM CHLORIDE, SODIUM LACTATE AND CALCIUM CHLORIDE 1000 ML: 600; 310; 30; 20 INJECTION, SOLUTION INTRAVENOUS at 14:28

## 2017-04-14 RX ADMIN — SODIUM CHLORIDE, POTASSIUM CHLORIDE, SODIUM LACTATE AND CALCIUM CHLORIDE 1000 ML: 600; 310; 30; 20 INJECTION, SOLUTION INTRAVENOUS at 14:29

## 2017-04-14 RX ADMIN — ROPIVACAINE HYDROCHLORIDE 200 MG: 2 INJECTION, SOLUTION EPIDURAL; INFILTRATION at 11:11

## 2017-04-14 RX ADMIN — ONDANSETRON 4 MG: 2 INJECTION INTRAMUSCULAR; INTRAVENOUS at 11:57

## 2017-04-14 RX ADMIN — FENTANYL CITRATE 50 MCG: 50 INJECTION INTRAMUSCULAR; INTRAVENOUS at 03:59

## 2017-04-14 RX ADMIN — FENTANYL CITRATE 50 MCG: 50 INJECTION INTRAMUSCULAR; INTRAVENOUS at 05:12

## 2017-04-14 RX ADMIN — FENTANYL CITRATE 50 MCG: 50 INJECTION INTRAMUSCULAR; INTRAVENOUS at 07:51

## 2017-04-14 ASSESSMENT — PAIN SCALES - GENERAL: PAINLEVEL_OUTOF10: 3

## 2017-04-14 NOTE — PROGRESS NOTES
"31 y.o. , EDC =38w0d here to L&D for IOL for proteinuria and elevated BP. Pt has GDM diet controlled.     0700- Bedside report received from Janey real RN. VSS,  sister-in-law and FOB at bedside. POC discussed.     110- Dr. Matute to bedside for placement of epidural.     1140- SVE little change, , call to Dr. Persaud, updated on pt status.     1200- Late decelerations noted, /59, previously 145/74 10 minutes earlier. Pt turned onto left side, fluids opened.     1210- Dr. Renee at bedside, updated on pt status b/c [facility]aud at [facility].     1620- Dr. Persaud at bedside, SVE no change, labor on.     1700- Pt feeling a lot of pressure and urge to bear down with ctx. SVE pt complete/+1. Call to Dr. Persaud.     1730- Start pushing.     -  of viable male, \"Carlos\" apgars 8/9.     1850- Placenta adherent, able to express placenta with pushing and fundal rubs.     - Bedside report given to Katrina real RN.   "

## 2017-04-14 NOTE — PROGRESS NOTES
comf with epidural  Rn checked pt 45 min ago and was 5-6/80/-2  EFM-cat1 most of the time, some periods of cat 2  Cont with iol for pih and gdm.

## 2017-04-14 NOTE — CARE PLAN
Problem: Knowledge Deficit  Goal: Patient/Support person demonstrates understanding regarding the progression of labor, available options and participates in decision-making process  Outcome: PROGRESSING AS EXPECTED

## 2017-04-14 NOTE — PROGRESS NOTES
Pt with mild CTXs  Balloon fell out  Cx 4cm per RN  Pitocin started  FHTs reassuring  Dr Persaud will assume care    awestfall

## 2017-04-14 NOTE — PROGRESS NOTES
1900- Received report from YADY Madrid RN. Patient resting in bed with no needs at this time.     2200- Patient states she feels uncomfortable and is having back pain. Patient repositioned. Discussed pain med options, patient declines at this time.     0000- Patient standing at bedside with mother providing counter pressure.     0300- Patient calls out. Balloon fell out while she was on tiolet. SVE as noted.     0350- Dr. Tellez called with updates. Orders to start pitocin. See MAR.    0500- Patient tolerating pitocin well. Fentanyl given per MAR.     0700- Report to KYLAH Garibay RN. POC discussed.

## 2017-04-14 NOTE — H&P
IDENTIFICATION:  This is a 31-year-old  female,  1, para 0,   EDC is 2017.  Estimated gestational age 37 and 6/7.    CHIEF COMPLAINT:  Sent directly from Dr. Salud Laird, maternal fetal   medicine's office for direct admission for pregnancy-induced hypertension and   induction of labor.    HISTORY OF PRESENT ILLNESS:  This patient has prenatal care at my office and   it really has been uncomplicated.  Earlier this week, she was feeling very   swollen and uncomfortable.  She was sent here to labor and delivery for blood   pressure monitoring.  Her blood pressures stabilized _____/70s.  [facility] labs were   normal with the exception of uric acid slightly elevated at 6.3.  She was   sent home.  She saw Dr. Laird today for monitoring, as she does have   gestational diabetes, diet controlled only, checking blood sugars every other   day.  Dr. Laird stated her blood pressures were elevated there.  She   reviewed her labs from earlier this week and thought induction was necessary.    Therefore, she sent her for induction of labor.    OBSTETRICAL HISTORY:  Primigravid patient.    GYNECOLOGIC HISTORY:  Benign.  No history of herpes simplex virus, oral or   genital, patient or .    PAST MEDICAL HISTORY:  Illnesses:  Asthma.    PAST SURGICAL HISTORY:  None.    ALLERGIES:  CEFTIN CAUSES A RASH.    CURRENT MEDICATIONS:  Prenatal vitamins.    SOCIAL HISTORY:  She denies alcohol, tobacco or drug use.  She is .    Her 's name is Waylon.    FAMILY MEDICAL HISTORY:  Noncontributory at this time.    REVIEW OF SYSTEMS:  She denies headache, epigastric pain, right upper quadrant   pain, visual changes or nondependent edema.  She reports active fetal   movement.  No vaginal bleeding or ruptured membranes.    PHYSICAL EXAMINATION:  VITAL SIGNS:  She is afebrile, blood pressures are 120s/70s.  LUNGS:  Clear to auscultation bilaterally.  HEART:  Regular rate and rhythm.  ABDOMEN:  Gravid and  nontender.  Fetal heart tracing is reactive, reassuring,   category 1.  Tocometer shows no regular uterine contractions.  PELVIC:  Cervix is 150 -2.  I was able to place a cervical ripening balloon   through the cervix into the uterus filled with 60 mL of fluid, the vaginal   portion of the balloon is infused with 60 mL of fluid.  EXTREMITIES:  Show 1+ edema.    DISCUSSION:  Thorough discussion with patient and family at this time   concerning Dr. Laird's recommendation for induction of labor secondary to   mild pregnancy-induced hypertension.  Blood pressure is under great control.    At this time, we will go ahead and induce labor with the cervical ripening   balloon.  The patient understands the steps with this.  She also understands   the increased risk of  delivery with induction of labor and accepts   these risks.    ASSESSMENT:  1.  Intrauterine pregnancy at 37 and 6/7.  2.  Mild pregnancy-induced hypertension.    PLAN:  The patient is admitted for labor induction, pain management in   anticipation of delivery.       ____________________________________     MD JUAN SCHROEDER / LIBAN    DD:  2017 18:08:28  DT:  2017 20:44:18    D#:  [id number]  Job#:  [id number]    cc: LALO DUNCAN MD, MARIELENA LAIRD MD

## 2017-04-15 VITALS
HEART RATE: 117 BPM | BODY MASS INDEX: 37.54 KG/M2 | RESPIRATION RATE: 20 BRPM | DIASTOLIC BLOOD PRESSURE: 80 MMHG | HEIGHT: 62 IN | SYSTOLIC BLOOD PRESSURE: 131 MMHG | TEMPERATURE: 98 F | OXYGEN SATURATION: 96 % | WEIGHT: 204 LBS

## 2017-04-15 LAB
ERYTHROCYTE [DISTWIDTH] IN BLOOD BY AUTOMATED COUNT: 44.4 FL (ref 35.9–50)
HCT VFR BLD AUTO: 31.8 % (ref 37–47)
HGB BLD-MCNC: 10.5 G/DL (ref 12–16)
MCH RBC QN AUTO: 28.6 PG (ref 27–33)
MCHC RBC AUTO-ENTMCNC: 33 G/DL (ref 33.6–35)
MCV RBC AUTO: 86.6 FL (ref 81.4–97.8)
PLATELET # BLD AUTO: 281 K/UL (ref 164–446)
PMV BLD AUTO: 9.6 FL (ref 9–12.9)
RBC # BLD AUTO: 3.67 M/UL (ref 4.2–5.4)
WBC # BLD AUTO: 18.7 K/UL (ref 4.8–10.8)

## 2017-04-15 PROCEDURE — A9270 NON-COVERED ITEM OR SERVICE: HCPCS | Performed by: OBSTETRICS & GYNECOLOGY

## 2017-04-15 PROCEDURE — 90715 TDAP VACCINE 7 YRS/> IM: CPT

## 2017-04-15 PROCEDURE — 700112 HCHG RX REV CODE 229

## 2017-04-15 PROCEDURE — 700102 HCHG RX REV CODE 250 W/ 637 OVERRIDE(OP): Performed by: OBSTETRICS & GYNECOLOGY

## 2017-04-15 PROCEDURE — 85027 COMPLETE CBC AUTOMATED: CPT

## 2017-04-15 PROCEDURE — 3E0234Z INTRODUCTION OF SERUM, TOXOID AND VACCINE INTO MUSCLE, PERCUTANEOUS APPROACH: ICD-10-PCS | Performed by: OBSTETRICS & GYNECOLOGY

## 2017-04-15 PROCEDURE — 700112 HCHG RX REV CODE 229: Performed by: OBSTETRICS & GYNECOLOGY

## 2017-04-15 PROCEDURE — 90471 IMMUNIZATION ADMIN: CPT

## 2017-04-15 PROCEDURE — 36415 COLL VENOUS BLD VENIPUNCTURE: CPT

## 2017-04-15 RX ORDER — IBUPROFEN 600 MG/1
600 TABLET ORAL EVERY 6 HOURS PRN
Qty: 30 TAB | Refills: 1 | Status: SHIPPED | OUTPATIENT
Start: 2017-04-15 | End: 2019-05-08

## 2017-04-15 RX ORDER — OXYCODONE HYDROCHLORIDE AND ACETAMINOPHEN 5; 325 MG/1; MG/1
1 TABLET ORAL EVERY 4 HOURS PRN
Qty: 15 TAB | Refills: 0 | Status: SHIPPED | OUTPATIENT
Start: 2017-04-15 | End: 2019-05-08

## 2017-04-15 RX ADMIN — OXYCODONE HYDROCHLORIDE AND ACETAMINOPHEN 1 TABLET: 5; 325 TABLET ORAL at 21:30

## 2017-04-15 RX ADMIN — IBUPROFEN 600 MG: 600 TABLET, FILM COATED ORAL at 01:57

## 2017-04-15 RX ADMIN — OXYCODONE HYDROCHLORIDE AND ACETAMINOPHEN 1 TABLET: 5; 325 TABLET ORAL at 15:38

## 2017-04-15 RX ADMIN — IBUPROFEN 600 MG: 600 TABLET, FILM COATED ORAL at 21:30

## 2017-04-15 RX ADMIN — TETANUS TOXOID, REDUCED DIPHTHERIA TOXOID AND ACELLULAR PERTUSSIS VACCINE, ADSORBED 0.5 ML: 5; 2.5; 8; 8; 2.5 SUSPENSION INTRAMUSCULAR at 21:29

## 2017-04-15 RX ADMIN — OXYCODONE HYDROCHLORIDE AND ACETAMINOPHEN 1 TABLET: 5; 325 TABLET ORAL at 09:18

## 2017-04-15 RX ADMIN — IBUPROFEN 600 MG: 600 TABLET, FILM COATED ORAL at 09:19

## 2017-04-15 RX ADMIN — IBUPROFEN 600 MG: 600 TABLET, FILM COATED ORAL at 15:38

## 2017-04-15 RX ADMIN — Medication 1 TABLET: at 09:19

## 2017-04-15 RX ADMIN — DOCUSATE SODIUM 100 MG: 100 CAPSULE ORAL at 01:57

## 2017-04-15 ASSESSMENT — PAIN SCALES - GENERAL
PAINLEVEL_OUTOF10: 5
PAINLEVEL_OUTOF10: 5
PAINLEVEL_OUTOF10: 4
PAINLEVEL_OUTOF10: 4
PAINLEVEL_OUTOF10: 6
PAINLEVEL_OUTOF10: 5
PAINLEVEL_OUTOF10: 3

## 2017-04-15 NOTE — PROGRESS NOTES
Report received at 0700. Assessment completed: Tachycardic- MD aware. Patient ambulates by self. Fundus firm, lochia scant rubra. Patient states pain 3/10; declines medication. Patient would like medication offered when available. Bed in lowest locked position. Call light in place. All questions and concerns addressed. Will continue to monitor.

## 2017-04-15 NOTE — PROGRESS NOTES
Report received from BRITTANY Herndon. Assessment done, Vitals stable, patient oriented to room & skylight. Call light placed within reach, patient educated in regards to safety of baby and herself. Patient instructed to call before ambulating. All questions answered.

## 2017-04-15 NOTE — CARE PLAN
Problem: Communication  Goal: The ability to communicate needs accurately and effectively will improve  Outcome: PROGRESSING AS EXPECTED  Pt. Taking adequate PO fluids, voiding in toilet, does not want to breastfeed, Requested formula, education done in regards to formula feeding and parents verbalized understanding.     Problem: Pain Management  Goal: Pain level will decrease to patient’s comfort goal  Outcome: PROGRESSING AS EXPECTED  Pt. Would like to take PRN pain meds as scheduled but not when sleeping.

## 2017-04-15 NOTE — PROGRESS NOTES
Hospital Day : 2    S: doing well; desired dc today if baby dc'd    O:  Filed Vitals:    17 1921 17 2031 04/15/17 0000 04/15/17 0400   BP: 150/84 127/81 118/77 103/64   Pulse: 117 115 111 113   Temp:  36.6 °C (97.9 °F) 36.6 °C (97.8 °F) 36.6 °C (97.9 °F)   TempSrc:       Resp:     Height:       Weight:       SpO2:  93% 92% 93%       Recent Labs      17   1500  04/15/17   0304   WBC  8.6  18.7*   RBC  4.42  3.67*   HEMOGLOBIN  12.6  10.5*   HEMATOCRIT  37.4  31.8*   MCV  84.6  86.6   MCH  28.5  28.6   MCHC  33.7  33.0*   RDW  43.8  44.4   PLATELETCT  320  281   MPV  9.7  9.6     Recent Labs      17   1500   SODIUM  134*   POTASSIUM  3.8   CHLORIDE  106   CO2  18*   GLUCOSE  68   BUN  10   CREATININE  0.42*   CALCIUM  9.1         abd soft ff    A: pp 1 sp ; mild pih with good bp control; gdm    P: dc if baby dc'd

## 2017-04-15 NOTE — DELIVERY NOTE
DATE OF SERVICE:  2017    ADMITTING DIAGNOSES:  1.  Pregnancy at 37-6/7 weeks.  2.  Mild PIH.  3.  Diet-controlled gestational diabetes.    HOSPITAL COURSE IN DETAIL:  This is a 31-year-old  1, para 0 with an   EDC of 2017, who was admitted at 37-6/7 with mild PIH for induction of   labor.  She was a known gestational diabetic with good blood sugar control.    She was subsequently admitted.  Cervix was 150 and -2.  A balloon was placed.    The balloon fell out spontaneously.  In the morning, she was 4 cm.  She was   subsequently AROM clear and IUPC was placed.  She received an epidural for   pain control and was started on Pitocin, progressed over normal labor curve to   complete with an overall reassuring fetal heart tracing.  At complete, she   was able to push the baby down to a +3 station, extend the baby's head and   deliver atraumatically.  The nares and mouth were bulb suctioned.  There was   no nuchal cord.  The shoulders and body followed without complication.  The   cord was clamped and cut.  Cord gases were collected, but were not valid.  The   infant was handed off to awaiting nursing team.  At this point, after a long   delay, the placenta delivered intact with a 3-vessel cord.  The uterus firmed   up nicely after 20 units of Pitocin.  Cervix was intact.  There was a small   first-degree midline laceration, which was repaired under local with 3-0   Monocryl running locking above the hymenal ring.  Estimated blood loss was 300   mL.  The patient and baby to recovery room in stable condition.    FINDINGS:  Include a male infant with Apgars of 8 and 9.  There were no   complications.       ____________________________________     MD CRISTINA Laird / LIBAN    DD:  2017 19:06:24  DT:  2017 22:37:35    D#:  [id number]  Job#:  [id number]

## 2017-04-15 NOTE — PROGRESS NOTES
1900: Received report from KYLAH Garibay RN. POC discussed and pt verbalizes understanding.    2035: Pt up to the bathroom. Tolerated well. Ambulated to wheelchair. Transferred to PPU in stable condition via wheelchair.    2040: Report given to DARCY Madden RN. POC discussed. Bands verified. No further concerns at this time.

## 2017-04-16 NOTE — CARE PLAN
Problem: Altered physiologic condition related to immediate post-delivery state and potential for bleeding/hemorrhage  Goal: Patient physiologically stable as evidenced by normal lochia, palpable uterine involution and vital signs within normal limits  Outcome: PROGRESSING AS EXPECTED  Fundal massage per protocol.     Problem: Potential for postpartum infection related to presence of episiotomy/vaginal tear and/or uterine contamination  Goal: Patient will be absent from signs and symptoms of infection  Outcome: PROGRESSING AS EXPECTED  Patient afebrile; no s/s of infection.

## 2017-04-16 NOTE — PROGRESS NOTES
Pt education and discharge instructions reviewed with pt, expresses understanding. All questions answered, pt denies additional questions. Discharged home in stable condition with all personal belongings.

## 2017-04-16 NOTE — DISCHARGE INSTRUCTIONS
POSTPARTUM DISCHARGE INSTRUCTIONS FOR MOM    YOB: 1985   Age: 31 y.o.               Admit Date: 4/13/2017     Discharge Date: 4/15/2017  Attending Doctor:  Anna Tellez M.D.                  Allergies:  Cefdinir    Discharged to home by car. Discharged via wheelchair, hospital escort: Yes.  Special equipment needed: Not Applicable  Belongings with: Personal  Be sure to schedule a follow-up appointment with your primary care doctor or any specialists as instructed.     Discharge Plan:   Diet Plan: Discussed  Activity Level: Discussed  Confirmed Follow up Appointment: Patient to Call and Schedule Appointment  Confirmed Symptoms Management: Discussed  Medication Reconciliation Updated: Yes  Influenza Vaccine Indication: Not indicated: Previously immunized this influenza season and > 8 years of age    REASONS TO CALL YOUR OBSTETRICIAN:  1.   Persistent fever or shaking chills (Temperature higher than 100.4)  2.   Heavy bleeding (soaking more than 1 pad per hour); Passing clots  3.   Foul odor from vagina  4.   Mastitis (Breast infection; breast pain, chills, fever, redness)  5.   Urinary pain, burning or frequency  6.   Episiotomy infection    8.   Severe depression longer than 24 hours    HAND WASHING  · Prior to handling the baby.  · Before breastfeeding or bottle feeding baby.  · After using the bathroom or changing the baby's diaper.    VAGINAL CARE  · Nothing inside vagina for 6 weeks: no sexual intercourse, tampons or douching.  · Bleeding may continue for 2-4 weeks.  Amount may vary.    · Call your physician for heavy bleeding which means soaking more than 1 pad per hour    BIRTH CONTROL  · It is possible to become pregnant at any time after delivery and while breastfeeding.  · Plan to discuss a method of birth control with your physician at your follow up visit. visit.    DIET AND ELIMINATION  · Eating more fiber (bran cereal, fruits, and vegetables) and drinking plenty of fluids will help to  "avoid constipation.  · Urinary frequency after childbirth is normal.    POSTPARTUM BLUES  During the first few days after birth, you may experience a sense of the \"blues\" which may include impatience, irritability or even crying.  These feeling come and go quickly.  However, as many as 1 in 10 women experience emotional symptoms known as postpartum depression.    Postpartum depression:  May start as early as the second or third day after delivery or take several weeks or months to develop.  Symptoms of \"blues\" are present, but are more intense:  Crying spells; loss of appetite; feelings of hopelessness or loss of control; fear of touching the baby; over concern or no concern at all about the baby; little or no concern about your own appearance/caring for yourself; and/or inability to sleep or excessive sleeping.  Contact your physician if you are experiencing any of these symptoms.    Crisis Hotline:  · [place] Crisis Hotline:  7-841-OTJIFGU  Or 1-423.814.2716  · [place] Crisis Hotline:  1-558.127.3566  Or 058-303-4914    PREVENTING SHAKEN BABY:  If you are angry or stressed, PUT THE BABY IN THE CRIB, step away, take some deep breaths, and wait until you are calm to care for the baby.  DO NOT SHAKE THE BABY.  You are not alone, call a supporter for help.    · Crisis Call Center 24/7 crisis line 519-832-6255 or 1-402.179.7884  · You can also text them, text \"ANSWER\" to 139961    QUIT SMOKING/TOBACCO USE:  I understand the use of any tobacco products increases my chance of suffering from future heart disease and could cause other illnesses which may shorten my life. Quitting the use of tobacco products is the single most important thing I can do to improve my health. For further information on smoking / tobacco cessation call a Toll Free Quit Line at 1-288.275.2075 (*National [employer] [place]) or 1-224.467.5392 (American Lung Association) or you can access the web based program at www.lungusa.org.    · [place] Tobacco " Users Help Line:  (960) 476-4540       Toll Free: 1-923.704.7353  · Quit Tobacco Program [facility] (073)050-8550    DEPRESSION / SUICIDE RISK:  As you are discharged from this [facility], it is important to learn how to keep safe from harming yourself.    Recognize the warning signs:  · Abrupt changes in personality, positive or negative- including increase in energy   · Giving away possessions  · Change in eating patterns- significant weight changes-  positive or negative  · Change in sleeping patterns- unable to sleep or sleeping all the time   · Unwillingness or inability to communicate  · Depression  · Unusual sadness, discouragement and loneliness  · Talk of wanting to die  · Neglect of personal appearance   · Rebelliousness- reckless behavior  · Withdrawal from people/activities they love  · Confusion- inability to concentrate     If you or a loved one observes any of these behaviors or has concerns about self-harm, here's what you can do:  · Talk about it- your feelings and reasons for harming yourself  · Remove any means that you might use to hurt yourself (examples: pills, rope, extension cords, firearm)  · Get professional help from the community (Mental Health, Substance Abuse, psychological counseling)  · Do not be alone:Call your Safe Contact- someone whom you trust who will be there for you.  · Call your local CRISIS HOTLINE 328-3967 or 546-471-7890  · Call your local Children's Mobile Crisis Response Team [place] (127) 064-8569 or www.SezWho  · Call the toll free National Suicide Prevention Hotlines   · National Suicide Prevention Lifeline 001-156-QTYM (0952)  · National Hope Line Network 800-SUICIDE (455-7841)    DISCHARGE SURVEY:  Thank you for choosing [facility].  We hope we provided you with very good care.  You may be receiving a survey in the mail.  Please fill it out.  Your opinion is valuable to us.    ADDITIONAL EDUCATIONAL MATERIALS  GIVEN TO PATIENT:        My signature on this form indicates that:  1.  I have reviewed and understand the above information  2.  My questions regarding this information have been answered to my satisfaction.  3.  I have formulated a plan with my discharge nurse to obtain my prescribed medication for home.

## 2017-04-16 NOTE — PROGRESS NOTES
Assessment complete. VSS. Fundus firm, lochia light. Pain controlled with prn medications per mar. Pt will call to request pain medications. FOB at bedside bonding with pt and baby. States voiding without difficulty. POC discussed. Encouraged to call with needs. Call light in place

## 2017-04-25 ENCOUNTER — OFFICE VISIT (OUTPATIENT)
Dept: URGENT CARE | Facility: PHYSICIAN GROUP | Age: 32
End: 2017-04-25
Payer: COMMERCIAL

## 2017-04-25 VITALS
RESPIRATION RATE: 20 BRPM | HEIGHT: 62 IN | TEMPERATURE: 99 F | WEIGHT: 185 LBS | HEART RATE: 107 BPM | BODY MASS INDEX: 34.04 KG/M2 | OXYGEN SATURATION: 91 % | SYSTOLIC BLOOD PRESSURE: 120 MMHG | DIASTOLIC BLOOD PRESSURE: 76 MMHG

## 2017-04-25 DIAGNOSIS — S31.41XA VAGINAL LACERATION, INITIAL ENCOUNTER: ICD-10-CM

## 2017-04-25 DIAGNOSIS — J06.9 VIRAL URI: ICD-10-CM

## 2017-04-25 LAB
FLUAV+FLUBV AG SPEC QL IA: NEGATIVE
INT CON NEG: NEGATIVE
INT CON NEG: NEGATIVE
INT CON POS: POSITIVE
INT CON POS: POSITIVE
S PYO AG THROAT QL: NEGATIVE

## 2017-04-25 PROCEDURE — 99214 OFFICE O/P EST MOD 30 MIN: CPT | Performed by: FAMILY MEDICINE

## 2017-04-25 PROCEDURE — 87804 INFLUENZA ASSAY W/OPTIC: CPT | Performed by: FAMILY MEDICINE

## 2017-04-25 PROCEDURE — 87880 STREP A ASSAY W/OPTIC: CPT | Performed by: FAMILY MEDICINE

## 2017-04-25 RX ORDER — BENZONATATE 100 MG/1
100 CAPSULE ORAL 3 TIMES DAILY PRN
Qty: 60 CAP | Refills: 0 | Status: SHIPPED | OUTPATIENT
Start: 2017-04-25 | End: 2019-05-08

## 2017-04-25 RX ORDER — OXYCODONE HYDROCHLORIDE AND ACETAMINOPHEN 5; 325 MG/1; MG/1
1-2 TABLET ORAL EVERY 4 HOURS PRN
Qty: 5 TAB | Refills: 0 | Status: SHIPPED | OUTPATIENT
Start: 2017-04-25 | End: 2019-05-08

## 2017-04-25 NOTE — PROGRESS NOTES
Chief Complaint   Patient presents with   • Cough     Runny nose, sore throat, ears hurt x 4 days          Cough  This is a new problem. The current episode started  3 days ago. The problem has been unchanged. The problem occurs constantly. The cough is dry. Associated symptoms include :sore throat,  fatigue, ear congestion. Pertinent negatives include no fever ,  headaches, nausea, vomiting, diarrhea, sweats, weight loss or wheezing. Nothing aggravates the symptoms.  Patient has tried nothing for the symptoms. There is no history of asthma.   She is 11 days post partum, s/p  with laceration and she is complaining of pain at laceration site - she was evaluated by her ob/gyn yesterday and she had no signs of wound infection, she just needs refill on the percocet for pain control.     Past medical history was unremarkable and not pertinent to current issue      Social History   Substance Use Topics   • Smoking status: Never Smoker    • Smokeless tobacco: Not on file   • Alcohol Use: No         Current Outpatient Prescriptions on File Prior to Visit   Medication Sig Dispense Refill   • ibuprofen (MOTRIN) 600 MG Tab Take 1 Tab by mouth every 6 hours as needed (cramping after delivery; do not give if patient is receiving ketorolac (Toradol)). 30 Tab 1   • oxycodone-acetaminophen (PERCOCET) 5-325 MG Tab Take 1 Tab by mouth every four hours as needed (for Moderate Pain (Pain Scale 4-6) after delivery). 15 Tab 0   • Prenatal MV-Min-Fe Fum-FA-DHA (PRENATAL 1 PO) Take  by mouth.       No current facility-administered medications on file prior to visit.                    Review of Systems   Constitutional: Negative for fever and weight loss.   HENT: negative for otalgia  Cardiovascular - denies chest pain or dyspnea  Respiratory: Positive for cough.  .  Negative for wheezing.    Neurological: Negative for headaches.   GI - denies nausea, vomiting or diarrhea  Neuro - denies numbness or tingling.            Objective:  "    Blood pressure 120/76, pulse 107, temperature 37.2 °C (99 °F), resp. rate 20, height 1.575 m (5' 2.01\"), weight 83.915 kg (185 lb), SpO2 91 %.    Physical Exam   Constitutional: patient is oriented to person, place, and time. Patient appears well-developed and well-nourished. No distress.   HENT:   Head: Normocephalic and atraumatic.   Right Ear: External ear normal.   Left Ear: External ear normal.   Nose: Mucosal edema  present. Right sinus exhibits no maxillary sinus tenderness. Left sinus exhibits no maxillary sinus tenderness.   Mouth/Throat: Mucous membranes are normal. No oral lesions.  No posterior pharyngeal erythema.  No oropharyngeal exudate or posterior oropharyngeal edema.   Eyes: Conjunctivae and EOM are normal. Pupils are equal, round, and reactive to light. Right eye exhibits no discharge. Left eye exhibits no discharge. No scleral icterus.   Neck: Normal range of motion. Neck supple. No tracheal deviation present.   Cardiovascular: Normal rate, regular rhythm and normal heart sounds.  Exam reveals no friction rub.    Pulmonary/Chest: Effort normal. No respiratory distress. Patient has no wheezes or rhonchi. Patient has no rales.    Musculoskeletal:  exhibits no edema.   Lymphadenopathy:     Patient has no cervical adenopathy.      Neurological: patient is alert and oriented to person, place, and time.   Skin: Skin is warm and dry. No rash noted. No erythema.   Psychiatric: patient  has a normal mood and affect.  behavior is normal.   Nursing note and vitals reviewed.              Assessment/Plan:             1. Viral URI   rapid influenza, strep negative.    Rx tessalon  Follow up in one week if no improvement           2. Vaginal laceration, initial encounter  Percocet refilled   F/u ob/gyn      - oxycodone-acetaminophen (PERCOCET) 5-325 MG Tab; Take 1-2 Tabs by mouth every four hours as needed.  Dispense: 5 Tab; Refill: 0    "

## 2017-04-25 NOTE — MR AVS SNAPSHOT
"        Olinda Trevino   2017 10:20 AM   Office Visit   MRN: [id number]    Department:  [facility]   Dept Phone:  [phone]    Description:  Female : 1985   Provider:  John Stanton M.D.           Reason for Visit     Cough Runny nose, sore throat, ears hurt x 4 days       Allergies as of 2017     Allergen Noted Reactions    Cefdinir 2016   Rash    Hands and feet broke out in rash, took benadryl       You were diagnosed with     Viral URI   [[id number]]         Vital Signs     Blood Pressure Pulse Temperature Respirations Height Weight    120/76 mmHg 107 37.2 °C (99 °F) 20 1.575 m (5' 2.01\") 83.915 kg (185 lb)    Body Mass Index Oxygen Saturation Smoking Status             33.83 kg/m2 91% Never Smoker          Basic Information     Date Of Birth Sex Race Ethnicity Preferred Language    1985 Female White Non- English      Problem List              ICD-10-CM Priority Class Noted - Resolved    Labor abnormal O62.9   2017 - Present      Health Maintenance        Date Due Completion Dates    PAP SMEAR 2008 ---    IMM DTaP/Tdap/Td Vaccine (2 - Td) 4/15/2027 4/15/2017            Results     POCT Rapid Strep A      Component    Rapid Strep Screen    Negative    Internal Control Positive    Positive    Internal Control Negative    Negative                POCT Influenza A/B      Component    Rapid Influenza A-B    Negative    Internal Control Positive    Positive    Internal Control Negative    Negative                        Current Immunizations     Tdap Vaccine 4/15/2017  9:29 PM      Below and/or attached are the medications your provider expects you to take. Review all of your home medications and newly ordered medications with your provider and/or pharmacist. Follow medication instructions as directed by your provider and/or pharmacist. Please keep your medication list with you and share with your provider. Update the information when medications are " discontinued, doses are changed, or new medications (including over-the-counter products) are added; and carry medication information at all times in the event of emergency situations     Allergies:  CEFDINIR - Rash               Medications  Valid as of: April 25, 2017 - 11:02 AM    Generic Name Brand Name Tablet Size Instructions for use    Benzonatate (Cap) TESSALON 100 MG Take 1 Cap by mouth 3 times a day as needed for Cough.        Cholecalciferol (Tab) cholecalciferol 1000 UNIT Take 1,000 Units by mouth every day.        Ibuprofen (Tab) MOTRIN 600 MG Take 1 Tab by mouth every 6 hours as needed (cramping after delivery; do not give if patient is receiving ketorolac (Toradol)).        Loratadine   Take  by mouth.        Oxycodone-Acetaminophen (Tab) PERCOCET 5-325 MG Take 1 Tab by mouth every four hours as needed (for Moderate Pain (Pain Scale 4-6) after delivery).        Oxycodone-Acetaminophen (Tab) PERCOCET 5-325 MG Take 1-2 Tabs by mouth every four hours as needed.        Prenatal MV-Min-Fe Fum-FA-DHA   Take  by mouth.        .                 Medicines prescribed today were sent to:     [facility] [address] [place] [address] [place], [place] - [address] [facility] [place] [address] [address] [place] [address]    Phone: [phone] Fax: [phone]    Open 24 Hours?: No      Medication refill instructions:       If your prescription bottle indicates you have medication refills left, it is not necessary to call your provider’s office. Please contact your pharmacy and they will refill your medication.    If your prescription bottle indicates you do not have any refills left, you may request refills at any time through one of the following ways: The online [employer] system (except Urgent Care), by calling your provider’s office, or by asking your pharmacy to contact your provider’s office with a refill request. Medication refills are processed only during regular business hours and may not be available until  the next business day. Your provider may request additional information or to have a follow-up visit with you prior to refilling your medication.   *Please Note: Medication refills are assigned a new Rx number when refilled electronically. Your pharmacy may indicate that no refills were authorized even though a new prescription for the same medication is available at the pharmacy. Please request the medicine by name with the pharmacy before contacting your provider for a refill.           [employer] Access Code: Activation code not generated  Current [facility] Status: Active

## 2017-09-10 ENCOUNTER — OFFICE VISIT (OUTPATIENT)
Dept: URGENT CARE | Facility: PHYSICIAN GROUP | Age: 32
End: 2017-09-10
Payer: COMMERCIAL

## 2017-09-10 VITALS
BODY MASS INDEX: 35.15 KG/M2 | HEART RATE: 115 BPM | SYSTOLIC BLOOD PRESSURE: 126 MMHG | DIASTOLIC BLOOD PRESSURE: 80 MMHG | HEIGHT: 62 IN | WEIGHT: 191 LBS | OXYGEN SATURATION: 96 % | RESPIRATION RATE: 16 BRPM | TEMPERATURE: 99.2 F

## 2017-09-10 DIAGNOSIS — J06.9 VIRAL URI: Primary | ICD-10-CM

## 2017-09-10 DIAGNOSIS — E66.9 OBESITY (BMI 30-39.9): ICD-10-CM

## 2017-09-10 DIAGNOSIS — J02.9 PHARYNGITIS, UNSPECIFIED ETIOLOGY: ICD-10-CM

## 2017-09-10 LAB
INT CON NEG: NEGATIVE
INT CON POS: POSITIVE
S PYO AG THROAT QL: NEGATIVE

## 2017-09-10 PROCEDURE — 99214 OFFICE O/P EST MOD 30 MIN: CPT | Performed by: PHYSICIAN ASSISTANT

## 2017-09-10 PROCEDURE — 87880 STREP A ASSAY W/OPTIC: CPT | Performed by: PHYSICIAN ASSISTANT

## 2017-09-10 RX ORDER — PSEUDOEPHEDRINE HCL 30 MG
30 TABLET ORAL EVERY 4 HOURS PRN
Qty: 30 TAB | Refills: 0 | Status: SHIPPED | OUTPATIENT
Start: 2017-09-10 | End: 2017-09-14

## 2017-09-10 ASSESSMENT — ENCOUNTER SYMPTOMS
EYES NEGATIVE: 1
CARDIOVASCULAR NEGATIVE: 1
SORE THROAT: 1
MUSCULOSKELETAL NEGATIVE: 1
CHILLS: 1
GASTROINTESTINAL NEGATIVE: 1
PSYCHIATRIC NEGATIVE: 1
HEADACHES: 1
DIZZINESS: 0
COUGH: 1

## 2017-09-10 NOTE — PROGRESS NOTES
Subjective:      Olinda Trevino is a 32 y.o. female who presents with Sore Throat (C/o sore throat, swollen throat, post nasal drip, ears plugged, sinus congestion, chills x2 days. )            HPI  Chief Complaint   Patient presents with   • Sore Throat     C/o sore throat, swollen throat, post nasal drip, ears plugged, sinus congestion, chills x2 days.        HPI:  Olinda Trevino is a 32 y.o. female who presents with 2-3 days of sinus drainage and sore throat.  Lots of post nasal drainage and sinus congestion.  Rhinorrhea is mostly going down throat.  Also having subjective fever and chills.  Has tried sudafed and did not help.  Took some this am around 7am.  Patient denies HA, SOB, chest pain, palpitations, or n/v/d.    Has 5 month old. Not breast feeding.Concerned about strep throat and given infection to child.  No past medical history on file.    No past surgical history on file.    History reviewed. No pertinent family history.    Social History     Social History   • Marital status:      Spouse name: N/A   • Number of children: N/A   • Years of education: N/A     Occupational History   • Not on file.     Social History Main Topics   • Smoking status: Never Smoker   • Smokeless tobacco: Never Used   • Alcohol use No   • Drug use: No   • Sexual activity: Not on file     Other Topics Concern   • Not on file     Social History Narrative   • No narrative on file         Current Outpatient Prescriptions:   •  Pseudoephedrine HCl (SUDAFED 12 HOUR PO), Take  by mouth.  •  vitamin D, 1,000 Units, Oral, DAILY, Not taking  •  Loratadine (CLARITIN PO), Take  by mouth., Not taking  •  benzonatate, 100 mg, Oral, TID PRN, Not taking  •  oxycodone-acetaminophen, 1-2 Tab, Oral, Q4HRS PRN, Not taking  •  oxycodone-acetaminophen, 1 Tab, Oral, Q4HRS PRN, Not taking  •  ibuprofen, 600 mg, Oral, Q6HRS PRN, Not taking  •  Prenatal MV-Min-Fe Fum-FA-DHA (PRENATAL 1 PO), Take  by mouth., Not taking    Allergies  "  Allergen Reactions   • Cefdinir Rash     Hands and feet broke out in rash, took benadryl         Review of Systems   Constitutional: Positive for chills and malaise/fatigue.   HENT: Positive for congestion, ear pain and sore throat.    Eyes: Negative.    Respiratory: Positive for cough.    Cardiovascular: Negative.    Gastrointestinal: Negative.    Genitourinary: Negative.    Musculoskeletal: Negative.    Skin: Negative.    Neurological: Positive for headaches. Negative for dizziness.   Endo/Heme/Allergies: Negative.    Psychiatric/Behavioral: Negative.           Objective:     /80   Pulse (!) 115   Temp 37.3 °C (99.2 °F)   Resp 16   Ht 1.575 m (5' 2\")   Wt 86.6 kg (191 lb)   SpO2 96%   BMI 34.93 kg/m²      Physical Exam       Nursing note and vital signs reviewed.    Constitutional:  Appropriately groomed, pleasant affect, well nourished, and in no acute distress.    HEENT:  Head: Atruamatic, normocephalic.    Ears:  EAC with mild cerumen bilaterally, not erythematous.  TM’s pearly gray with cone of light present and umbo and malleolus visible bilaterally.  No bulging or fluid bubbles present in middle ear.    Eyes:  PERRLA, EOM's full, sclera white, conjunctiva not erythematous, and medial canthus without exudate bilaterally.    Nose:  Nares patent bilaterally.  Nasal mucosa edematous with clear rhinorrhea bilaterally.  Frontal and maxillary sinuses not tender to percussion.    Throat:  Oropharynx erythematous, with no enlargement of the palatine tonsils bilaterally with no exudates.    Posterior oropharynx with cobblestoning and clear to white post nasal drainage present.  Soft palate rises symmetrically bilaterally and uvula midline.  Neck supple, with mild proximal anterior cervical chain lymphadenopathy that is soft and mobile to palpation.      Lungs: Respiratory effort within normal limits. Lungs clear to auscultation bilaterally. No crackles or rhonchi noted.     Heart:  Regular rate and " rhythm without rubs, murmurs, or gallops. Dorsalis pedis and radial pulses 2+ bilaterally. No lower extremity edema.    Muscle skeletal:  Gait and station wnl, non antalgic.    Derm:  No lesions or rashes. Overall good turgor pressure.     Psychiatric:  Normal judgement, mood and affect.      Assessment/Plan:     1. Viral URI  pseudoephedrine (SUDAFED) 30 MG Tab   2. Pharyngitis, unspecified etiology  POCT Rapid Strep A      Patient presents with suspected viral URI for several days. On exam patient has an erythematous oropharynx and coryza. Lungs clear to auscultation bilaterally. Rapid strep negative. Recommended symptoms support measures and did prescribe Sudafed 30 mg every 3-4 hours. Patient is not breast feeding. Reviewed signs and symptoms of a bacterial infection and when to return to the clinic.    Patient was in agreement with this treatment plan and seemed to understand without barriers. All questions were encouraged and answered.  Reviewed signs and symptoms of when to seek emergency medical care.     Please note that this dictation was created using voice recognition software.  I have made every reasonable attempt to correct obvious errors, but I expect there are errors of erasmo and possibly content that I did not discover before finalizing the note.

## 2017-09-10 NOTE — PATIENT INSTRUCTIONS
Flonase and nasal saline irrigation (netti pot or Ryan Med Sinus Rinse).  Used distilled water or boiled tap water with nasal flushes, not straight tap water.  Sudafed 30mg every 3-4 hours (take about 2-3 a day).  Stop 3 hours before bedtime.  Humidifier at bedtime.  Hot steam showers to loosen up mucous.  Cough medicine at bedtime.  Lots of fluids, tea with honey.  Ibuprofen for headache, fever, chills.  Be sure to take with food.  Salt water gargles.  Return if worsening: Yellow thicker mucus changes, worsening pain around the eyes and radiates to the teeth, and fever over 101°F.    Flonase and nasal saline irrigation (netti pot or Ryan Med Sinus Rinse).  Used distilled water or boiled tap water with nasal flushes, not straight tap water.  Humidifier at bedtime.  Hot steam showers to loosen up mucous.  Cough medicine at bedtime.  Lots of fluids, tea with honey.  Ibuprofen for headache, fever, chills.  Be sure to take with food.  Salt water gargles.  Return if worsening: Yellow thicker mucus changes, worsening pain around the eyes and radiates to the teeth, and fever over 101°F.

## 2018-09-08 ENCOUNTER — OFFICE VISIT (OUTPATIENT)
Dept: URGENT CARE | Facility: PHYSICIAN GROUP | Age: 33
End: 2018-09-08
Payer: COMMERCIAL

## 2018-09-08 VITALS
HEART RATE: 102 BPM | SYSTOLIC BLOOD PRESSURE: 128 MMHG | WEIGHT: 199.2 LBS | RESPIRATION RATE: 20 BRPM | BODY MASS INDEX: 36.43 KG/M2 | TEMPERATURE: 99.2 F | OXYGEN SATURATION: 96 % | DIASTOLIC BLOOD PRESSURE: 86 MMHG

## 2018-09-08 DIAGNOSIS — R10.2 SUPRAPUBIC PAIN: ICD-10-CM

## 2018-09-08 DIAGNOSIS — R30.0 DYSURIA: ICD-10-CM

## 2018-09-08 LAB
APPEARANCE UR: NORMAL
BILIRUB UR STRIP-MCNC: NEGATIVE MG/DL
COLOR UR AUTO: NORMAL
GLUCOSE UR STRIP.AUTO-MCNC: NEGATIVE MG/DL
KETONES UR STRIP.AUTO-MCNC: NEGATIVE MG/DL
LEUKOCYTE ESTERASE UR QL STRIP.AUTO: NEGATIVE
NITRITE UR QL STRIP.AUTO: NEGATIVE
PH UR STRIP.AUTO: 5 [PH] (ref 5–8)
PROT UR QL STRIP: 100 MG/DL
RBC UR QL AUTO: NORMAL
SP GR UR STRIP.AUTO: 1.02
UROBILINOGEN UR STRIP-MCNC: 0.2 MG/DL

## 2018-09-08 PROCEDURE — 81002 URINALYSIS NONAUTO W/O SCOPE: CPT | Performed by: NURSE PRACTITIONER

## 2018-09-08 PROCEDURE — 99214 OFFICE O/P EST MOD 30 MIN: CPT | Performed by: NURSE PRACTITIONER

## 2018-09-08 RX ORDER — NITROFURANTOIN 25; 75 MG/1; MG/1
100 CAPSULE ORAL 2 TIMES DAILY
Qty: 10 CAP | Refills: 0 | Status: SHIPPED | OUTPATIENT
Start: 2018-09-08 | End: 2018-09-13

## 2018-09-08 ASSESSMENT — ENCOUNTER SYMPTOMS
VOMITING: 0
FLANK PAIN: 0
NAUSEA: 0
DIAPHORESIS: 0
BLOOD IN STOOL: 0
CHILLS: 0
WEAKNESS: 0
DIARRHEA: 0
FEVER: 0
CONSTIPATION: 0
ABDOMINAL PAIN: 1

## 2018-09-08 NOTE — PROGRESS NOTES
Subjective:   Olinda Muñoz a 33 y.o. female who presents for UTI (burning feeling, frequent urination, strated today)    Patient comes in today with a new onset of dysuria with urgency and frequency since this morning.  She notes suprapubic pain with no fever, chills, nausea, vomiting, or flank pain.  She is currently on her menstrual cycle and notes generalized vaginal discomfort.  She denies any vaginal discharge changes, lesions, or suspected STI.  She was having some vaginal irritation with burning and pain without itching or discharge last week and consulted her gynecologist.  She was prescribed a 7 day course of monistat and symptoms seemed to improve.  Not breastfeeding.  No history of UTI.      Review of Systems   Constitutional: Negative for chills, diaphoresis, fever and malaise/fatigue.   Gastrointestinal: Positive for abdominal pain. Negative for blood in stool, constipation, diarrhea, melena, nausea and vomiting.   Genitourinary: Positive for dysuria, frequency and urgency. Negative for flank pain and hematuria.   Neurological: Negative for weakness.   Medications, Allergies, and current problem list reviewed today in Epic  Allergies   Allergen Reactions   • Cefdinir Rash     Hands and feet broke out in rash, took benadryl       Objective:   /86   Pulse (!) 102   Temp 37.3 °C (99.2 °F)   Resp 20   Wt 90.4 kg (199 lb 3.2 oz)   SpO2 96%   BMI 36.43 kg/m²   Physical Exam   Constitutional: She is oriented to person, place, and time. She appears well-developed and well-nourished. No distress.   Cardiovascular: Regular rhythm and normal heart sounds.  Exam reveals no gallop and no friction rub.    No murmur heard.  Slight tachycardia noted.   Pulmonary/Chest: Effort normal and breath sounds normal.   Abdominal: Soft. Normal appearance and bowel sounds are normal. She exhibits no shifting dullness, no distension, no pulsatile liver, no fluid wave, no abdominal bruit, no ascites, no  pulsatile midline mass and no mass. There is no hepatosplenomegaly. There is tenderness in the suprapubic area. There is no rigidity, no rebound, no guarding, no CVA tenderness, no tenderness at McBurney's point and negative Murrieta's sign.   Genitourinary:   Genitourinary Comments: Patient declined.   Musculoskeletal: She exhibits no edema.   Neurological: She is alert and oriented to person, place, and time.   Skin: Skin is warm and dry. No rash noted. She is not diaphoretic. No erythema.   Vitals reviewed.    [address] [address] Olinda Trevino  MRN: [id number], : 1985, Sex: F  Encounter date: 2018   Order   POCT Urinalysis [[id number]] [SJR88843 (CPT®)] (Order [id number])   View SmartLink Info     POCT URINALYSIS (Order #[id number]) on 18   Component Results     Component Value Ref Range & Units Status   POC Color Red  Negative Final   POC Appearance Cloudy  Negative Final   POC Leukocyte Esterase Negative  Negative Final   POC Nitrites Negative  Negative Final   POC Urobiligen 0.2  Negative (0.2) mg/dL Final   POC Protein 100  Negative mg/dL Final   POC Urine PH 5.0  5.0 - 8.0 Final   POC Blood Large  Negative Final   POC Specific Gravity 1.020  <1.005 - >1.030 Final   POC Ketones Negative  Negative mg/dL Final   POC Bilirubin Negative  Negative mg/dL Final   POC Glucose Negative  Negative mg/dL Final   Last Resulted Time   Sat Sep 8, 2018  2:26 PM   Detailed Information     Priority and Order Details Collection Information          Collection Information     Collected: 2018  2:01 PM      Order-Level Documents:     There are no order-level documents.   Order Detail Report     POCT URINALYSIS (Order #[id number]) on 18   Order-Level Documents:     There are no order-level documents.   Encounter-Level Documents:     There are no encounter-level documents.     Assessment/Plan:   Assessment    1. Suprapubic pain  - nitrofurantoin monohydr macro (MACROBID) 100 MG  Cap; Take 1 Cap by mouth 2 times a day for 5 days.  Dispense: 10 Cap; Refill: 0    2. Dysuria  - POCT Urinalysis [[id number]]    Discussed exam findings with patient.  NO clear UTI based on UA.  Will treat for presumptive UTI.  Differential reviewed including vaginitis or other unknown.  Limited evaluation due to declined vaginal exam.    Take full course of antibiotics.  Maintain adequate po hydration.  OTC analgesics and warm/cold compress prn pain.  To ER if pain worsens, or if fever or vomiting develop.  Follow up with gynecologist or PCP in 3-5 days if symptoms persist, sooner if worse.  Patient verbalized understanding of and agreed with plan of care.  Differential diagnosis, natural history, supportive care, and indications for immediate follow-up discussed.  Return if symptoms worsen or fail to improve.

## 2018-09-23 ENCOUNTER — APPOINTMENT (OUTPATIENT)
Dept: RADIOLOGY | Facility: MEDICAL CENTER | Age: 33
End: 2018-09-23
Attending: EMERGENCY MEDICINE
Payer: COMMERCIAL

## 2018-09-23 ENCOUNTER — HOSPITAL ENCOUNTER (EMERGENCY)
Facility: MEDICAL CENTER | Age: 33
End: 2018-09-23
Attending: EMERGENCY MEDICINE
Payer: COMMERCIAL

## 2018-09-23 VITALS
HEIGHT: 62 IN | DIASTOLIC BLOOD PRESSURE: 82 MMHG | HEART RATE: 97 BPM | SYSTOLIC BLOOD PRESSURE: 134 MMHG | OXYGEN SATURATION: 97 % | WEIGHT: 200.62 LBS | BODY MASS INDEX: 36.92 KG/M2 | TEMPERATURE: 98.9 F | RESPIRATION RATE: 20 BRPM

## 2018-09-23 DIAGNOSIS — N76.0 ACUTE VAGINITIS: ICD-10-CM

## 2018-09-23 DIAGNOSIS — R10.9 FLANK PAIN: ICD-10-CM

## 2018-09-23 DIAGNOSIS — R30.0 DYSURIA: ICD-10-CM

## 2018-09-23 DIAGNOSIS — N20.0 BILATERAL KIDNEY STONES: ICD-10-CM

## 2018-09-23 LAB
ANION GAP SERPL CALC-SCNC: 13 MMOL/L (ref 0–11.9)
APPEARANCE UR: CLEAR
BACTERIA #/AREA URNS HPF: ABNORMAL /HPF
BACTERIA GENITAL QL WET PREP: NORMAL
BASOPHILS # BLD AUTO: 0.7 % (ref 0–1.8)
BASOPHILS # BLD: 0.06 K/UL (ref 0–0.12)
BILIRUB UR QL STRIP.AUTO: NEGATIVE
BUN SERPL-MCNC: 14 MG/DL (ref 8–22)
CALCIUM SERPL-MCNC: 9.7 MG/DL (ref 8.5–10.5)
CHLORIDE SERPL-SCNC: 102 MMOL/L (ref 96–112)
CO2 SERPL-SCNC: 20 MMOL/L (ref 20–33)
COLOR UR: ABNORMAL
CREAT SERPL-MCNC: 0.69 MG/DL (ref 0.5–1.4)
EOSINOPHIL # BLD AUTO: 0.04 K/UL (ref 0–0.51)
EOSINOPHIL NFR BLD: 0.5 % (ref 0–6.9)
EPI CELLS #/AREA URNS HPF: ABNORMAL /HPF
ERYTHROCYTE [DISTWIDTH] IN BLOOD BY AUTOMATED COUNT: 41 FL (ref 35.9–50)
GLUCOSE SERPL-MCNC: 138 MG/DL (ref 65–99)
GLUCOSE UR STRIP.AUTO-MCNC: 250 MG/DL
HCG UR QL: NEGATIVE
HCT VFR BLD AUTO: 40.5 % (ref 37–47)
HGB BLD-MCNC: 13.9 G/DL (ref 12–16)
IMM GRANULOCYTES # BLD AUTO: 0.06 K/UL (ref 0–0.11)
IMM GRANULOCYTES NFR BLD AUTO: 0.7 % (ref 0–0.9)
KETONES UR STRIP.AUTO-MCNC: NEGATIVE MG/DL
LEUKOCYTE ESTERASE UR QL STRIP.AUTO: NEGATIVE
LYMPHOCYTES # BLD AUTO: 1.37 K/UL (ref 1–4.8)
LYMPHOCYTES NFR BLD: 16.3 % (ref 22–41)
MCH RBC QN AUTO: 30 PG (ref 27–33)
MCHC RBC AUTO-ENTMCNC: 34.3 G/DL (ref 33.6–35)
MCV RBC AUTO: 87.5 FL (ref 81.4–97.8)
MICRO URNS: ABNORMAL
MONOCYTES # BLD AUTO: 0.32 K/UL (ref 0–0.85)
MONOCYTES NFR BLD AUTO: 3.8 % (ref 0–13.4)
NEUTROPHILS # BLD AUTO: 6.55 K/UL (ref 2–7.15)
NEUTROPHILS NFR BLD: 78 % (ref 44–72)
NRBC # BLD AUTO: 0 K/UL
NRBC BLD-RTO: 0 /100 WBC
PH UR STRIP.AUTO: 5 [PH]
PLATELET # BLD AUTO: 380 K/UL (ref 164–446)
PMV BLD AUTO: 8.8 FL (ref 9–12.9)
POTASSIUM SERPL-SCNC: 4.1 MMOL/L (ref 3.6–5.5)
PROT UR QL STRIP: >=300 MG/DL
RBC # BLD AUTO: 4.63 M/UL (ref 4.2–5.4)
RBC # URNS HPF: ABNORMAL /HPF
RBC UR QL AUTO: ABNORMAL
SIGNIFICANT IND 70042: NORMAL
SITE SITE: NORMAL
SODIUM SERPL-SCNC: 135 MMOL/L (ref 135–145)
SOURCE SOURCE: NORMAL
SP GR UR REFRACTOMETRY: 1.02
SP GR UR STRIP.AUTO: 1.02
UROBILINOGEN UR STRIP.AUTO-MCNC: 4 MG/DL
WBC # BLD AUTO: 8.4 K/UL (ref 4.8–10.8)
WBC #/AREA URNS HPF: ABNORMAL /HPF

## 2018-09-23 PROCEDURE — 74176 CT ABD & PELVIS W/O CONTRAST: CPT

## 2018-09-23 PROCEDURE — 99285 EMERGENCY DEPT VISIT HI MDM: CPT

## 2018-09-23 PROCEDURE — 81001 URINALYSIS AUTO W/SCOPE: CPT

## 2018-09-23 PROCEDURE — 96374 THER/PROPH/DIAG INJ IV PUSH: CPT

## 2018-09-23 PROCEDURE — 81025 URINE PREGNANCY TEST: CPT

## 2018-09-23 PROCEDURE — 80048 BASIC METABOLIC PNL TOTAL CA: CPT

## 2018-09-23 PROCEDURE — 85025 COMPLETE CBC W/AUTO DIFF WBC: CPT

## 2018-09-23 PROCEDURE — 87491 CHLMYD TRACH DNA AMP PROBE: CPT

## 2018-09-23 PROCEDURE — 87591 N.GONORRHOEAE DNA AMP PROB: CPT

## 2018-09-23 PROCEDURE — 700111 HCHG RX REV CODE 636 W/ 250 OVERRIDE (IP): Performed by: EMERGENCY MEDICINE

## 2018-09-23 RX ORDER — KETOROLAC TROMETHAMINE 30 MG/ML
30 INJECTION, SOLUTION INTRAMUSCULAR; INTRAVENOUS ONCE
Status: COMPLETED | OUTPATIENT
Start: 2018-09-23 | End: 2018-09-23

## 2018-09-23 RX ORDER — HYDROCODONE BITARTRATE AND ACETAMINOPHEN 5; 325 MG/1; MG/1
1 TABLET ORAL EVERY 4 HOURS PRN
Qty: 9 TAB | Refills: 0 | Status: SHIPPED | OUTPATIENT
Start: 2018-09-23 | End: 2018-09-26

## 2018-09-23 RX ORDER — FLUCONAZOLE 200 MG/1
200 TABLET ORAL DAILY
Qty: 1 TAB | Refills: 0 | Status: SHIPPED | OUTPATIENT
Start: 2018-09-23 | End: 2019-05-08

## 2018-09-23 RX ADMIN — KETOROLAC TROMETHAMINE 30 MG: 30 INJECTION, SOLUTION INTRAMUSCULAR; INTRAVENOUS at 10:12

## 2018-09-23 ASSESSMENT — ENCOUNTER SYMPTOMS
DIARRHEA: 0
HEADACHES: 0
ABDOMINAL PAIN: 1
NAUSEA: 0
DIZZINESS: 0
VOMITING: 0
FLANK PAIN: 1
SHORTNESS OF BREATH: 0
FEVER: 0
BACK PAIN: 1

## 2018-09-23 ASSESSMENT — PAIN DESCRIPTION - DESCRIPTORS: DESCRIPTORS: BURNING

## 2018-09-23 ASSESSMENT — PAIN SCALES - GENERAL
PAINLEVEL_OUTOF10: 10
PAINLEVEL_OUTOF10: 0

## 2018-09-23 NOTE — ED NOTES
Pt resting in bed, states flank pain has resolved, but she recently voided which triggered more burning/irritation. Pt reports no needs at this time. Awaiting CT. Spouse at bedside.

## 2018-09-23 NOTE — ED NOTES
"Pt ambulates steadily to room, she is tearful. Pt states she had vaginal irritation ~4 wks ago, thought it was a yeast infection so treated herself w/ OTC Monostat, and s/s improved. S/s returned ~2 wks ago, so she went to urgent care w/ normal UA but was prescribed a round of abx, which she completed and she felt better until this morning. Pt woke up today w/ vaginal burning aggravated by walking and urinating. Associated R flank pain radiating to RLQ and white malodorous vag dc. Also reports salt-like crystals \"hard as rock\" upon wiping. Denies n/v/d, fever/chills. Took AZO this morning so urine is discolored.   "

## 2018-09-23 NOTE — ED PROVIDER NOTES
ED Provider Note    ED Provider Note    Primary care provider: Jaki Guzman P.A.-C.  Means of arrival: POV  History obtained from: Patient  History limited by: None    CHIEF COMPLAINT  Chief Complaint   Patient presents with   • Vaginal Pain       HPI  Olinda Trevino is a 33 y.o. female who presents to the Emergency Department with her  with a chief complaint of burning with urination, right low back pain, urinary frequency and right flank pain.  Patient states she has been having symptoms for about 2 weeks.  Previously, they were more related to burning and frequency.  She consulted her gynecologist at the beginning of September for the symptoms.  She was not seen by her gynecologist but they recommended, over the telephone, to try Monistat.  Which she did and she had some improvement.  Then she had return of symptoms and she was seen in the urgent care on September 8.  She was diagnosed with dysuria and started on Macrobid which she took as prescribed for 5 days.  Again, she states that she felt better but this morning, she woke up with right flank pain that she rated 10 out of 10 that radiated down to her right lower quadrant and again had return of frequency, burning of urination.  She denies any fever.  Her last menstrual period was a week ago.  No nausea or vomiting.  The pain comes and goes.  Currently, she rates it at an 8 out of 10.  She is taking azathioprine.  Otherwise, she only takes over-the-counter medication for allergies and multivitamins.    REVIEW OF SYSTEMS  Review of Systems   Constitutional: Negative for fever.   HENT: Negative for congestion.    Respiratory: Negative for shortness of breath.    Cardiovascular: Negative for chest pain.   Gastrointestinal: Positive for abdominal pain. Negative for diarrhea, nausea and vomiting.   Genitourinary: Positive for dysuria, flank pain, frequency and urgency.   Musculoskeletal: Positive for back pain.   Neurological: Negative for dizziness  "and headaches.   All other systems reviewed and are negative.      PAST MEDICAL HISTORY   She denies any past medical history.    SURGICAL HISTORY  patient denies any surgical history    SOCIAL HISTORY  Social History   Substance Use Topics   • Smoking status: Never Smoker   • Smokeless tobacco: Never Used   • Alcohol use No      History   Drug Use No       FAMILY HISTORY  History reviewed. No pertinent family history.    CURRENT MEDICATIONS  Home Medications     Reviewed by Shannon Lockhart R.N. (Registered Nurse) on 09/23/18 at 0910  Med List Status: Complete   Medication Last Dose Status   benzonatate (TESSALON) 100 MG Cap Not taking Active   ibuprofen (MOTRIN) 600 MG Tab Not taking Active   Loratadine (CLARITIN PO) Not taking Active   oxycodone-acetaminophen (PERCOCET) 5-325 MG Tab Not taking Active   oxycodone-acetaminophen (PERCOCET) 5-325 MG Tab Not taking Active   Phenazopyridine HCl (AZO URINARY PAIN PO) 9/23/2018 Active   Prenatal MV-Min-Fe Fum-FA-DHA (PRENATAL 1 PO) Not taking Active   Pseudoephedrine HCl (SUDAFED 12 HOUR PO) not taking Active   vitamin D (CHOLECALCIFEROL) 1000 UNIT Tab Not taking Active                ALLERGIES  Allergies   Allergen Reactions   • Cefdinir Rash     Hands and feet broke out in rash, took benadryl        PHYSICAL EXAM  VITAL SIGNS: /82   Pulse 97   Temp 37.2 °C (98.9 °F)   Resp 20   Ht 1.575 m (5' 2\")   Wt 91 kg (200 lb 9.9 oz)   LMP 09/09/2018 (Approximate)   SpO2 97%   BMI 36.69 kg/m²   Vitals reviewed.  Constitutional: Patient is oriented to person, place, and time. Appears well-developed and well-nourished.  Mild distress.    Head: Normocephalic and atraumatic.   Mouth/Throat: Oropharynx is clear and moist, no exudates.   Eyes: Conjunctivae are normal.   Neck: Normal range of motion. Neck supple.  Cardiovascular: Normal rate, regular rhythm and normal heart sounds.   Pulmonary/Chest: Effort normal and breath sounds normal. No respiratory distress, no " wheezes.  Abdominal: Soft. Bowel sounds are normal. There is suprapubic and right lower quadrant mild tenderness. No rebound or guarding, or peritoneal signs.  Right CVA tenderness.  : External vaginal exam shows normal female genitalia.  There is no irritation or lesions noted.  Musculoskeletal: No edema and no tenderness.   Neurological: No focal deficits.   Skin: Skin is warm and dry. No erythema. No pallor.   Psychiatric: Patient has a normal mood and affect.     LABS  Results for orders placed or performed during the hospital encounter of 09/23/18   URINALYSIS CULTURE, IF INDICATED   Result Value Ref Range    Micro Urine Req Microscopic     Character Clear     Specific Gravity 1.025 <1.035    Ph 5.0 5.0 - 8.0    Glucose 250 (A) Negative mg/dL    Ketones Negative Negative mg/dL    Protein >=300 (A) Negative mg/dL    Bilirubin Negative Negative    Urobilinogen, Urine 4.0 Negative    Leukocyte Esterase Negative Negative    Occult Blood Large (A) Negative    Color Orange    HCG QUALITATIVE UR   Result Value Ref Range    Beta-Hcg Urine Negative Negative   REFRACTOMETER SG   Result Value Ref Range    Specific Gravity 1.025    URINE MICROSCOPIC (W/UA)   Result Value Ref Range    WBC 0-2 /hpf    -150 (A) /hpf    Bacteria Rare (A) None /hpf    Epithelial Cells Few /hpf   CBC WITH DIFFERENTIAL   Result Value Ref Range    WBC 8.4 4.8 - 10.8 K/uL    RBC 4.63 4.20 - 5.40 M/uL    Hemoglobin 13.9 12.0 - 16.0 g/dL    Hematocrit 40.5 37.0 - 47.0 %    MCV 87.5 81.4 - 97.8 fL    MCH 30.0 27.0 - 33.0 pg    MCHC 34.3 33.6 - 35.0 g/dL    RDW 41.0 35.9 - 50.0 fL    Platelet Count 380 164 - 446 K/uL    MPV 8.8 (L) 9.0 - 12.9 fL    Neutrophils-Polys 78.00 (H) 44.00 - 72.00 %    Lymphocytes 16.30 (L) 22.00 - 41.00 %    Monocytes 3.80 0.00 - 13.40 %    Eosinophils 0.50 0.00 - 6.90 %    Basophils 0.70 0.00 - 1.80 %    Immature Granulocytes 0.70 0.00 - 0.90 %    Nucleated RBC 0.00 /100 WBC    Neutrophils (Absolute) 6.55 2.00 -  7.15 K/uL    Lymphs (Absolute) 1.37 1.00 - 4.80 K/uL    Monos (Absolute) 0.32 0.00 - 0.85 K/uL    Eos (Absolute) 0.04 0.00 - 0.51 K/uL    Baso (Absolute) 0.06 0.00 - 0.12 K/uL    Immature Granulocytes (abs) 0.06 0.00 - 0.11 K/uL    NRBC (Absolute) 0.00 K/uL   BASIC METABOLIC PANEL   Result Value Ref Range    Sodium 135 135 - 145 mmol/L    Potassium 4.1 3.6 - 5.5 mmol/L    Chloride 102 96 - 112 mmol/L    Co2 20 20 - 33 mmol/L    Glucose 138 (H) 65 - 99 mg/dL    Bun 14 8 - 22 mg/dL    Creatinine 0.69 0.50 - 1.40 mg/dL    Calcium 9.7 8.5 - 10.5 mg/dL    Anion Gap 13.0 (H) 0.0 - 11.9   ESTIMATED GFR   Result Value Ref Range    GFR If African American >60 >60 mL/min/1.73 m 2    GFR If Non African American >60 >60 mL/min/1.73 m 2   WET PREP   Result Value Ref Range    Significant Indicator NEG     Source GEN     Site VAGINAL     Wet Prep For Parasites       No yeast.  No motile Trichomonas seen.  No clue cells seen.     CHLAMYDIA & GC BY PCR   Result Value Ref Range    Source Other        All labs reviewed by me.    RADIOLOGY  CT-RENAL COLIC EVALUATION(A/P W/O)   Final Result      1.  Bilateral nonobstructing kidney stones.   2.  No ureteral stone or hydronephrosis.   3.  Appendix is partially visualized.  Appendicitis is felt unlikely.   4.  Heterogeneous fatty infiltration of liver.   5.  Cholelithiasis.        The radiologist's interpretation of all radiological studies have been reviewed by me.    COURSE & MEDICAL DECISION MAKING  Pertinent Labs & Imaging studies reviewed. (See chart for details)    Obtained and reviewed past medical records.  Patient encounter from September 8 noted.  She was diagnosed with dysuria and suprapubic pain and put on Macrobid.  That time, she is noted to have vaginal irritation, burning.  She has seen her gynecologist, early September who prescribed a seven-day course of Monistat.    9:19 AM - Patient seen and examined at bedside.  This is a pleasant and previously healthy 33-year-old  female who presents with right flank pain concerning for kidney stone or possible pyelonephritis.  She was recently treated for dysuria and had some improvement but now has return of symptoms.  Unlikely pregnant, given recent LMP.  Urine ordered per protocol by nursing staff, prior to placing the patient in her room.  Will obtain CT scan as well as check labs for renal function and CBC.      1:00 PM patient's reevaluated the bedside.  Her flank pain is resolved however, she continues to have pain when she urinates.  We discussed lab results which included a urine that does not reflect an infection.  We did discuss CT findings which show evidence of bilateral kidney stones, nonobstructing.  It seemed at this time, that they may be the cause of her intermittent pain.  White blood cell counts normal.  Kidney function normal.  I have advised her to follow-up with her gynecologist.  She will also be referred to urology.  I have had her collect pelvic swabs.  Awaiting wet mount.  I discussed with my colleague, Dr. Du who will check the patient's wet mount and prescribed antibiotics as needed given results.  At this time, patient will be discharged home with Diflucan as she feels her yeast infection symptoms were inadequately treated because the Monistat was irritating.  And she will be prescribed pain medication as needed.   checked.  We discussed opioid prescribing elements.  She is overall well-appearing and nontoxic with normal vital signs.  I anticipate discharge to home in stable condition once wet prep is resulted.        FINAL IMPRESSION  1. Flank pain    2. Bilateral kidney stones    3. Dysuria    4. Acute vaginitis

## 2018-09-23 NOTE — ED TRIAGE NOTES
"Pt ambulatory to triage c/o burning vaginal pain not associated with urination has been on and off for weeks. Pt states seen at urgent care and was placed on antibiotics although \"they said they did not find any infection\" pt states now pain is worse. ..Pt given urine specimen cup and instructed on clean catch technique pt verbalizes understanding. Shown restroom.   "

## 2018-09-26 LAB
C TRACH DNA SPEC QL NAA+PROBE: NEGATIVE
N GONORRHOEA DNA SPEC QL NAA+PROBE: NEGATIVE
SPECIMEN SOURCE: NORMAL

## 2018-10-01 ENCOUNTER — HOSPITAL ENCOUNTER (OUTPATIENT)
Dept: LAB | Facility: MEDICAL CENTER | Age: 33
End: 2018-10-01
Attending: NURSE PRACTITIONER
Payer: COMMERCIAL

## 2018-10-01 PROCEDURE — 82365 CALCULUS SPECTROSCOPY: CPT

## 2018-10-05 LAB
APPEARANCE STONE: NORMAL
COMPN STONE: NORMAL
NUMBER STONE: NORMAL
SIZE STONE: <1 MM
SPECIMEN WT: 2 MG

## 2018-11-24 ENCOUNTER — OFFICE VISIT (OUTPATIENT)
Dept: URGENT CARE | Facility: PHYSICIAN GROUP | Age: 33
End: 2018-11-24
Payer: COMMERCIAL

## 2018-11-24 VITALS
OXYGEN SATURATION: 96 % | HEIGHT: 62 IN | HEART RATE: 108 BPM | DIASTOLIC BLOOD PRESSURE: 86 MMHG | SYSTOLIC BLOOD PRESSURE: 132 MMHG | RESPIRATION RATE: 16 BRPM | WEIGHT: 180 LBS | BODY MASS INDEX: 33.13 KG/M2 | TEMPERATURE: 99.6 F

## 2018-11-24 DIAGNOSIS — J01.00 ACUTE MAXILLARY SINUSITIS, RECURRENCE NOT SPECIFIED: ICD-10-CM

## 2018-11-24 LAB
INT CON NEG: NEGATIVE
INT CON POS: POSITIVE
S PYO AG THROAT QL: NEGATIVE

## 2018-11-24 PROCEDURE — 99214 OFFICE O/P EST MOD 30 MIN: CPT | Performed by: FAMILY MEDICINE

## 2018-11-24 PROCEDURE — 87880 STREP A ASSAY W/OPTIC: CPT | Performed by: FAMILY MEDICINE

## 2018-11-24 RX ORDER — FLUTICASONE PROPIONATE 50 MCG
1 SPRAY, SUSPENSION (ML) NASAL 2 TIMES DAILY
Qty: 1 BOTTLE | Refills: 0 | Status: SHIPPED | OUTPATIENT
Start: 2018-11-24 | End: 2019-05-08

## 2018-11-24 RX ORDER — AMOXICILLIN 875 MG/1
875 TABLET, COATED ORAL 2 TIMES DAILY
Qty: 20 TAB | Refills: 0 | Status: SHIPPED | OUTPATIENT
Start: 2018-11-24 | End: 2018-12-04

## 2018-11-24 RX ORDER — BENZONATATE 200 MG/1
200 CAPSULE ORAL 3 TIMES DAILY PRN
Qty: 45 CAP | Refills: 0 | Status: SHIPPED | OUTPATIENT
Start: 2018-11-24 | End: 2019-05-08

## 2018-11-27 NOTE — PROGRESS NOTES
"Chief Complaint   Patient presents with   • Pharyngitis   • URI         Subjective:     CC:  presents with Pharyngitis            Pharyngitis   This is a new problem. The current episode started in the past 7 days. The problem has been unchanged. There has been no fever. The pain is moderate. Associated symptoms include a hoarse voice, swollen glands . Pertinent negatives include no abdominal pain, congestion, coughing, diarrhea, headaches, shortness of breath or vomiting. no exposure to strep or mono.   has tried acetaminophen for the symptoms. The treatment provided mild relief.     Social History   Substance Use Topics   • Smoking status: Never Smoker   • Smokeless tobacco: Never Used   • Alcohol use No            Review of Systems   Constitutional: Positive for malaise/fatigue. Negative for fever and weight loss.   HENT: Positive for sinus pain, congestion.         Respiratory: Negative for cough, sputum production and shortness of breath.    Cardiovascular: Negative for chest pain.   Gastrointestinal: Negative for nausea, vomiting, abdominal pain and diarrhea.   Genitourinary: Negative.    Neurological: Negative for dizziness and headaches.   All other systems reviewed and are negative.         Objective:   Blood pressure 132/86, pulse (!) 108, temperature 37.6 °C (99.6 °F), resp. rate 16, height 1.575 m (5' 2\"), weight 81.6 kg (180 lb), SpO2 96 %, not currently breastfeeding.        Physical Exam   Constitutional:   oriented to person, place, and time.  appears well-developed and well-nourished. No distress.   HENT:   Head: Normocephalic and atraumatic.   Right Ear: External ear normal.   Left Ear: External ear normal.   Nose: Mucosal edema and d/c present. Right and left sinus tenderness noted        Mouth/Throat: no posterior oropharyngeal exudate.   There is posterior oropharyngeal erythema present. No posterior oropharyngeal edema.   Tonsils not visualized     Eyes: Conjunctivae and EOM are normal. Pupils " are equal, round, and reactive to light. Right eye exhibits no discharge. Left eye exhibits no discharge. No scleral icterus.   Neck: Normal range of motion. Neck supple. No JVD present. No tracheal deviation present. No thyromegaly present.   Cardiovascular: Normal rate, regular rhythm, normal heart sounds and intact distal pulses.  Exam reveals no friction rub.    No murmur heard.  Pulmonary/Chest: Effort normal and breath sounds normal. No respiratory distress.   no wheezes.   no rales.    Musculoskeletal:  exhibits no edema.   Lymphadenopathy:     Positive Cervical adenopathy.   Neurological:   alert and oriented to person, place, and time.   Skin: Skin is warm and dry. No erythema.   Psychiatric:   normal mood and affect.   Nursing note and vitals reviewed.             Assessment/Plan:     1. Acute maxillary sinusitis, recurrence not specified  Rapid strep neg  - fluticasone (FLONASE) 50 MCG/ACT nasal spray; Spray 1 Spray in nose 2 times a day.  Dispense: 1 Bottle; Refill: 0  - benzonatate (TESSALON) 200 MG capsule; Take 1 Cap by mouth 3 times a day as needed for Cough.  Dispense: 45 Cap; Refill: 0  - amoxicillin (AMOXIL) 875 MG tablet; Take 1 Tab by mouth 2 times a day for 10 days.  Dispense: 20 Tab; Refill: 0

## 2019-05-08 ENCOUNTER — OFFICE VISIT (OUTPATIENT)
Dept: URGENT CARE | Facility: PHYSICIAN GROUP | Age: 34
End: 2019-05-08
Payer: COMMERCIAL

## 2019-05-08 ENCOUNTER — HOSPITAL ENCOUNTER (OUTPATIENT)
Dept: RADIOLOGY | Facility: MEDICAL CENTER | Age: 34
End: 2019-05-08
Attending: PHYSICIAN ASSISTANT
Payer: COMMERCIAL

## 2019-05-08 VITALS
WEIGHT: 180 LBS | TEMPERATURE: 99.5 F | BODY MASS INDEX: 33.13 KG/M2 | RESPIRATION RATE: 14 BRPM | SYSTOLIC BLOOD PRESSURE: 132 MMHG | OXYGEN SATURATION: 98 % | DIASTOLIC BLOOD PRESSURE: 92 MMHG | HEART RATE: 103 BPM | HEIGHT: 62 IN

## 2019-05-08 DIAGNOSIS — M54.50 ACUTE MIDLINE LOW BACK PAIN WITHOUT SCIATICA: ICD-10-CM
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
## 2019-05-08 DIAGNOSIS — S30.1XXA CONTUSION OF ABDOMINAL WALL, INITIAL ENCOUNTER: ICD-10-CM

## 2019-05-08 DIAGNOSIS — S40.011A CONTUSION OF RIGHT SHOULDER, INITIAL ENCOUNTER: ICD-10-CM

## 2019-05-08 PROCEDURE — 72100 X-RAY EXAM L-S SPINE 2/3 VWS: CPT

## 2019-05-08 PROCEDURE — 99213 OFFICE O/P EST LOW 20 MIN: CPT | Performed by: PHYSICIAN ASSISTANT

## 2019-05-08 RX ORDER — IBUPROFEN 800 MG/1
800 TABLET ORAL EVERY 8 HOURS PRN
Qty: 30 TAB | Refills: 0 | Status: SHIPPED | OUTPATIENT
Start: 2019-05-08 | End: 2021-05-12

## 2019-05-08 ASSESSMENT — PAIN SCALES - GENERAL: PAINLEVEL: 7=MODERATE-SEVERE PAIN

## 2019-05-10 ASSESSMENT — ENCOUNTER SYMPTOMS
LOSS OF CONSCIOUSNESS: 0
TINGLING: 0
JOINT SWELLING: 0
CHILLS: 0
DOUBLE VISION: 0
EYE PAIN: 0
DIARRHEA: 0
ABDOMINAL PAIN: 1
COUGH: 0
HEADACHES: 0
PHOTOPHOBIA: 0
SENSORY CHANGE: 0
NUMBNESS: 0
CONSTIPATION: 0
BACK PAIN: 1
ANOREXIA: 0
CHANGE IN BOWEL HABIT: 0
VISUAL CHANGE: 0
SHORTNESS OF BREATH: 0
DIZZINESS: 0
NAUSEA: 0
FEVER: 0
EYE REDNESS: 0
EYE DISCHARGE: 0
NECK PAIN: 1
BLOOD IN STOOL: 0
VOMITING: 0
BLURRED VISION: 0
ARTHRALGIAS: 1
SORE THROAT: 0
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
## 2019-06-30 ENCOUNTER — HOSPITAL ENCOUNTER (OUTPATIENT)
Facility: MEDICAL CENTER | Age: 34
End: 2019-06-30
Attending: NURSE PRACTITIONER
Payer: COMMERCIAL

## 2019-06-30 ENCOUNTER — HOSPITAL ENCOUNTER (OUTPATIENT)
Dept: RADIOLOGY | Facility: MEDICAL CENTER | Age: 34
End: 2019-06-30
Attending: NURSE PRACTITIONER
Payer: COMMERCIAL

## 2019-06-30 ENCOUNTER — OFFICE VISIT (OUTPATIENT)
Dept: URGENT CARE | Facility: PHYSICIAN GROUP | Age: 34
End: 2019-06-30
Payer: COMMERCIAL

## 2019-06-30 VITALS
HEART RATE: 104 BPM | WEIGHT: 196 LBS | HEIGHT: 62 IN | SYSTOLIC BLOOD PRESSURE: 136 MMHG | RESPIRATION RATE: 18 BRPM | OXYGEN SATURATION: 96 % | TEMPERATURE: 96.6 F | DIASTOLIC BLOOD PRESSURE: 90 MMHG | BODY MASS INDEX: 36.07 KG/M2

## 2019-06-30 DIAGNOSIS — Z04.1 ENCOUNTER FOR EXAMINATION FOLLOWING MOTOR VEHICLE ACCIDENT (MVA): ICD-10-CM

## 2019-06-30 DIAGNOSIS — R10.2 SUPRAPUBIC PAIN: ICD-10-CM

## 2019-06-30 DIAGNOSIS — Z87.442 HISTORY OF RENAL CALCULI: ICD-10-CM

## 2019-06-30 DIAGNOSIS — R07.9 CHEST PAIN, UNSPECIFIED TYPE: ICD-10-CM

## 2019-06-30 DIAGNOSIS — N20.0 RENAL CALCULI: ICD-10-CM

## 2019-06-30 LAB
APPEARANCE UR: CLEAR
BILIRUB UR STRIP-MCNC: NEGATIVE MG/DL
COLOR UR AUTO: YELLOW
GLUCOSE UR STRIP.AUTO-MCNC: NEGATIVE MG/DL
INT CON NEG: NORMAL
INT CON POS: NORMAL
KETONES UR STRIP.AUTO-MCNC: NEGATIVE MG/DL
LEUKOCYTE ESTERASE UR QL STRIP.AUTO: NEGATIVE
NITRITE UR QL STRIP.AUTO: NEGATIVE
PH UR STRIP.AUTO: 5 [PH] (ref 5–8)
POC URINE PREGNANCY TEST: NEGATIVE
PROT UR QL STRIP: 30 MG/DL
RBC UR QL AUTO: NORMAL
SP GR UR STRIP.AUTO: 1.02
UROBILINOGEN UR STRIP-MCNC: 0.2 MG/DL

## 2019-06-30 PROCEDURE — 76775 US EXAM ABDO BACK WALL LIM: CPT

## 2019-06-30 PROCEDURE — 87086 URINE CULTURE/COLONY COUNT: CPT

## 2019-06-30 PROCEDURE — 81025 URINE PREGNANCY TEST: CPT | Performed by: NURSE PRACTITIONER

## 2019-06-30 PROCEDURE — 99214 OFFICE O/P EST MOD 30 MIN: CPT | Performed by: NURSE PRACTITIONER

## 2019-06-30 PROCEDURE — 81002 URINALYSIS NONAUTO W/O SCOPE: CPT | Performed by: NURSE PRACTITIONER

## 2019-06-30 PROCEDURE — 71111 X-RAY EXAM RIBS/CHEST4/> VWS: CPT

## 2019-06-30 ASSESSMENT — ENCOUNTER SYMPTOMS
TINGLING: 0
SHORTNESS OF BREATH: 0
ABDOMINAL PAIN: 1
NEUROLOGICAL NEGATIVE: 1
PALPITATIONS: 0
VOMITING: 0
CONSTIPATION: 0
FOCAL WEAKNESS: 0
DIARRHEA: 0
CHILLS: 0
IRREGULAR HEARTBEAT: 0
CONSTITUTIONAL NEGATIVE: 1
SENSORY CHANGE: 0
NAUSEA: 0
FEVER: 0
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
## 2019-07-01 DIAGNOSIS — R10.2 SUPRAPUBIC PAIN: ICD-10-CM

## 2019-07-03 LAB
BACTERIA UR CULT: NORMAL
SIGNIFICANT IND 70042: NORMAL
SITE SITE: NORMAL
SOURCE SOURCE: NORMAL

## 2019-07-29 ENCOUNTER — HOSPITAL ENCOUNTER (OUTPATIENT)
Dept: LAB | Facility: MEDICAL CENTER | Age: 34
End: 2019-07-29
Attending: NURSE PRACTITIONER
Payer: COMMERCIAL

## 2019-07-29 PROCEDURE — 82570 ASSAY OF URINE CREATININE: CPT

## 2019-07-29 PROCEDURE — 83945 ASSAY OF OXALATE: CPT

## 2019-07-29 PROCEDURE — 80048 BASIC METABOLIC PNL TOTAL CA: CPT

## 2019-07-29 PROCEDURE — 82507 ASSAY OF CITRATE: CPT

## 2019-07-29 PROCEDURE — 83970 ASSAY OF PARATHORMONE: CPT

## 2019-07-29 PROCEDURE — 82340 ASSAY OF CALCIUM IN URINE: CPT

## 2019-07-29 PROCEDURE — 84300 ASSAY OF URINE SODIUM: CPT

## 2019-07-29 PROCEDURE — 83735 ASSAY OF MAGNESIUM: CPT

## 2019-07-29 PROCEDURE — 85025 COMPLETE CBC W/AUTO DIFF WBC: CPT

## 2019-07-29 PROCEDURE — 84560 ASSAY OF URINE/URIC ACID: CPT

## 2019-07-29 PROCEDURE — 84100 ASSAY OF PHOSPHORUS: CPT

## 2019-07-29 PROCEDURE — 81050 URINALYSIS VOLUME MEASURE: CPT

## 2019-07-29 PROCEDURE — 82131 AMINO ACIDS SINGLE QUANT: CPT

## 2019-07-29 PROCEDURE — 83986 ASSAY PH BODY FLUID NOS: CPT

## 2019-07-31 LAB
ANION GAP SERPL CALC-SCNC: 13 MMOL/L (ref 0–11.9)
BASOPHILS # BLD AUTO: 0.7 % (ref 0–1.8)
BASOPHILS # BLD: 0.05 K/UL (ref 0–0.12)
BODY FLD TYPE: NORMAL
BUN SERPL-MCNC: 14 MG/DL (ref 8–22)
CALCIUM SERPL-MCNC: 9.6 MG/DL (ref 8.5–10.5)
CHLORIDE SERPL-SCNC: 103 MMOL/L (ref 96–112)
CO2 SERPL-SCNC: 22 MMOL/L (ref 20–33)
CREAT 24H UR-MSRATE: 1524 MG/24 HR (ref 800–1800)
CREAT SERPL-MCNC: 0.61 MG/DL (ref 0.5–1.4)
CREAT UR-MCNC: 115 MG/DL
EOSINOPHIL # BLD AUTO: 0.17 K/UL (ref 0–0.51)
EOSINOPHIL NFR BLD: 2.5 % (ref 0–6.9)
ERYTHROCYTE [DISTWIDTH] IN BLOOD BY AUTOMATED COUNT: 44 FL (ref 35.9–50)
GLUCOSE SERPL-MCNC: 146 MG/DL (ref 65–99)
HCT VFR BLD AUTO: 44.7 % (ref 37–47)
HGB BLD-MCNC: 14.2 G/DL (ref 12–16)
IMM GRANULOCYTES # BLD AUTO: 0.04 K/UL (ref 0–0.11)
IMM GRANULOCYTES NFR BLD AUTO: 0.6 % (ref 0–0.9)
LYMPHOCYTES # BLD AUTO: 1.76 K/UL (ref 1–4.8)
LYMPHOCYTES NFR BLD: 25.8 % (ref 22–41)
MAGNESIUM SERPL-MCNC: 1.8 MG/DL (ref 1.5–2.5)
MCH RBC QN AUTO: 28.9 PG (ref 27–33)
MCHC RBC AUTO-ENTMCNC: 31.8 G/DL (ref 33.6–35)
MCV RBC AUTO: 90.9 FL (ref 81.4–97.8)
MONOCYTES # BLD AUTO: 0.26 K/UL (ref 0–0.85)
MONOCYTES NFR BLD AUTO: 3.8 % (ref 0–13.4)
NEUTROPHILS # BLD AUTO: 4.55 K/UL (ref 2–7.15)
NEUTROPHILS NFR BLD: 66.6 % (ref 44–72)
NRBC # BLD AUTO: 0 K/UL
NRBC BLD-RTO: 0 /100 WBC
PH FLD: 5.5 [PH]
PHOSPHATE SERPL-MCNC: 2.8 MG/DL (ref 2.5–4.5)
PLATELET # BLD AUTO: 381 K/UL (ref 164–446)
PMV BLD AUTO: 9.5 FL (ref 9–12.9)
POTASSIUM SERPL-SCNC: 4.1 MMOL/L (ref 3.6–5.5)
PTH-INTACT SERPL-MCNC: 16.2 PG/ML (ref 14–72)
RBC # BLD AUTO: 4.92 M/UL (ref 4.2–5.4)
SODIUM SERPL-SCNC: 138 MMOL/L (ref 135–145)
SPECIMEN VOL UR: 1325 ML
WBC # BLD AUTO: 6.8 K/UL (ref 4.8–10.8)

## 2019-08-01 LAB
CALCIUM 24H UR-MCNC: 21 MG/DL
CALCIUM 24H UR-MRATE: 278 MG/D
CALCIUM/CREAT 24H UR: 236 MG/G (ref 20–300)
CITRATE 24H UR-MCNC: 254 MG/L
CITRATE 24H UR-MRATE: 337 MG/D (ref 320–1240)
COLLECT DURATION TIME SPEC: 24 HRS
CREAT 24H UR-MCNC: 89 MG/DL
CREAT 24H UR-MRATE: 1179 MG/D (ref 700–1600)
SODIUM 24H UR-SCNC: 94 MMOL/L
SODIUM 24H UR-SRATE: 125 MMOL/D (ref 51–286)
SPECIMEN VOL ?TM UR: 1325 ML
URATE 24H UR-MCNC: 48.4 MG/DL
URATE 24H UR-MRATE: 641 MG/D (ref 250–750)

## 2019-08-02 LAB
COLLECT DURATION TIME SPEC: 24 HRS
CREAT 24H UR-MCNC: 89 MG/DL
CREAT 24H UR-MRATE: 1179 MG/D (ref 700–1600)
OXALATE 24H UR-MCNC: 13 MG/L
OXALATE 24H UR-MRATE: 17 MG/D (ref 13–40)
SPECIMEN VOL ?TM UR: 1325 ML

## 2019-08-03 LAB
COLLECT DURATION TIME SPEC: 24 HRS
CREAT UR-MCNC: 102 MG/DL
CYSTINE 24H UR-MCNC: 1.22 MG/DL
CYSTINE 24H UR-MRATE: 16 MG/D
CYSTINE/CREAT 24H UR-RTO: 50 UMOL/G CRT (ref 12–81)
TOTAL VOLUME 1105: 1325 ML

## 2019-08-11 ENCOUNTER — HOSPITAL ENCOUNTER (OUTPATIENT)
Dept: RADIOLOGY | Facility: MEDICAL CENTER | Age: 34
End: 2019-08-11
Attending: NURSE PRACTITIONER
Payer: COMMERCIAL

## 2019-08-11 DIAGNOSIS — N20.0 KIDNEY STONE: ICD-10-CM

## 2019-08-11 PROCEDURE — 74018 RADEX ABDOMEN 1 VIEW: CPT

## 2019-10-01 ENCOUNTER — APPOINTMENT (OUTPATIENT)
Dept: PHYSICAL THERAPY | Facility: REHABILITATION | Age: 34
End: 2019-10-01
Attending: PHYSICAL MEDICINE & REHABILITATION
Payer: COMMERCIAL

## 2019-10-03 ENCOUNTER — APPOINTMENT (OUTPATIENT)
Dept: PHYSICAL THERAPY | Facility: REHABILITATION | Age: 34
End: 2019-10-03
Payer: COMMERCIAL

## 2019-10-08 ENCOUNTER — APPOINTMENT (OUTPATIENT)
Dept: PHYSICAL THERAPY | Facility: REHABILITATION | Age: 34
End: 2019-10-08
Payer: COMMERCIAL

## 2019-10-10 ENCOUNTER — APPOINTMENT (OUTPATIENT)
Dept: PHYSICAL THERAPY | Facility: REHABILITATION | Age: 34
End: 2019-10-10
Payer: COMMERCIAL

## 2019-10-15 ENCOUNTER — APPOINTMENT (OUTPATIENT)
Dept: PHYSICAL THERAPY | Facility: REHABILITATION | Age: 34
End: 2019-10-15
Payer: COMMERCIAL

## 2019-10-17 ENCOUNTER — PHYSICAL THERAPY (OUTPATIENT)
Dept: PHYSICAL THERAPY | Facility: REHABILITATION | Age: 34
End: 2019-10-17
Attending: PHYSICAL MEDICINE & REHABILITATION
Payer: COMMERCIAL

## 2019-10-17 DIAGNOSIS — M54.50 LOW BACK PAIN, UNSPECIFIED BACK PAIN LATERALITY, UNSPECIFIED CHRONICITY, UNSPECIFIED WHETHER SCIATICA PRESENT: ICD-10-CM

## 2019-10-17 DIAGNOSIS — M79.18 MYALGIA, OTHER SITE: ICD-10-CM

## 2019-10-17 DIAGNOSIS — S20.219D CONTUSION OF UNSPECIFIED FRONT WALL OF THORAX, SUBSEQUENT ENCOUNTER: ICD-10-CM

## 2019-10-17 DIAGNOSIS — S46.011D STRAIN OF MUSCLE(S) AND TENDON(S) OF THE ROTATOR CUFF OF RIGHT SHOULDER, SUBSEQUENT ENCOUNTER: ICD-10-CM

## 2019-10-17 DIAGNOSIS — M54.2 CERVICALGIA: ICD-10-CM

## 2019-10-17 PROCEDURE — 97110 THERAPEUTIC EXERCISES: CPT

## 2019-10-17 PROCEDURE — 97161 PT EVAL LOW COMPLEX 20 MIN: CPT

## 2019-10-17 ASSESSMENT — ENCOUNTER SYMPTOMS
PAIN SCALE: 0
PAIN SCALE AT LOWEST: 0
QUALITY: ACHING
PAIN SCALE AT HIGHEST: 4
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
## 2019-10-22 ENCOUNTER — APPOINTMENT (OUTPATIENT)
Dept: PHYSICAL THERAPY | Facility: REHABILITATION | Age: 34
End: 2019-10-22
Attending: PHYSICAL MEDICINE & REHABILITATION
Payer: COMMERCIAL

## 2019-10-24 ENCOUNTER — APPOINTMENT (OUTPATIENT)
Dept: PHYSICAL THERAPY | Facility: REHABILITATION | Age: 34
End: 2019-10-24
Attending: PHYSICAL MEDICINE & REHABILITATION
Payer: COMMERCIAL

## 2019-10-29 ENCOUNTER — APPOINTMENT (OUTPATIENT)
Dept: PHYSICAL THERAPY | Facility: REHABILITATION | Age: 34
End: 2019-10-29
Attending: PHYSICAL MEDICINE & REHABILITATION
Payer: COMMERCIAL

## 2019-10-31 ENCOUNTER — APPOINTMENT (OUTPATIENT)
Dept: PHYSICAL THERAPY | Facility: REHABILITATION | Age: 34
End: 2019-10-31
Attending: PHYSICAL MEDICINE & REHABILITATION
Payer: COMMERCIAL

## 2019-11-05 ENCOUNTER — APPOINTMENT (OUTPATIENT)
Dept: PHYSICAL THERAPY | Facility: REHABILITATION | Age: 34
End: 2019-11-05
Attending: PHYSICAL MEDICINE & REHABILITATION
Payer: COMMERCIAL

## 2019-12-18 ENCOUNTER — TELEPHONE (OUTPATIENT)
Dept: PHYSICAL THERAPY | Facility: REHABILITATION | Age: 34
End: 2019-12-18

## 2019-12-19 NOTE — OP THERAPY DISCHARGE SUMMARY
Outpatient Physical Therapy  DISCHARGE SUMMARY NOTE      [facility]  [address], [address]  [facility] [address]  Phone:  [phone]  Fax:  [phone]    Date of Visit: 12/18/2019    Patient: Olinda Trevino  YOB: 1985  MRN: [id number]     Referring Provider: Zacarias Cuenca Jr., M.D.   Referring Diagnosis M54.2 (ICD-10-CM) - Cervicalgia   M54.5 (ICD-10-CM) - Low back pain   M79.18 (ICD-10-CM) - Myalgia, other site   S20.219D (ICD-10-CM) - Contusion of unspecified front wall of thorax, subsequent encounter   S46.011D (ICD-10-CM) - Strain of muscle(s) and tendon(s) of the rotator cuff of right shoulder, subsequent encounter        Physical Therapy Occurrence Codes    Date physical therapy care plan established or reviewed:  10/17/19   Date physical therapy treatment started:  10/17/19          Functional Assessment Used        Your patient is being discharged from Physical Therapy with the following comments:   · Patient has failed to schedule or reschedule follow-up visits  · Patient has been non-compliant with physical therapy plan of care    Comments:  Olinda Trevino failed to return to the clinic following evaluation. She has been discharged from physical therapy due to lapse of care greater than 30 days.     Limitations Remaining:  See evaluation    Recommendations:  See evaluation    Jaren Gonsalves, PT, DPT    Date: 12/18/2019

## 2020-10-01 ENCOUNTER — APPOINTMENT (RX ONLY)
Dept: URBAN - METROPOLITAN AREA CLINIC 22 | Facility: CLINIC | Age: 35
Setting detail: DERMATOLOGY
End: 2020-10-01

## 2020-10-01 DIAGNOSIS — D22 MELANOCYTIC NEVI: ICD-10-CM

## 2020-10-01 DIAGNOSIS — Z71.89 OTHER SPECIFIED COUNSELING: ICD-10-CM

## 2020-10-01 DIAGNOSIS — L91.8 OTHER HYPERTROPHIC DISORDERS OF THE SKIN: ICD-10-CM

## 2020-10-01 DIAGNOSIS — L81.4 OTHER MELANIN HYPERPIGMENTATION: ICD-10-CM

## 2020-10-01 DIAGNOSIS — D18.0 HEMANGIOMA: ICD-10-CM

## 2020-10-01 DIAGNOSIS — L30.4 ERYTHEMA INTERTRIGO: ICD-10-CM

## 2020-10-01 PROBLEM — D18.01 HEMANGIOMA OF SKIN AND SUBCUTANEOUS TISSUE: Status: ACTIVE | Noted: 2020-10-01

## 2020-10-01 PROBLEM — D22.5 MELANOCYTIC NEVI OF TRUNK: Status: ACTIVE | Noted: 2020-10-01

## 2020-10-01 PROBLEM — D48.5 NEOPLASM OF UNCERTAIN BEHAVIOR OF SKIN: Status: ACTIVE | Noted: 2020-10-01

## 2020-10-01 PROCEDURE — ? BENIGN DESTRUCTION

## 2020-10-01 PROCEDURE — 99203 OFFICE O/P NEW LOW 30 MIN: CPT | Mod: 25

## 2020-10-01 PROCEDURE — 11102 TANGNTL BX SKIN SINGLE LES: CPT

## 2020-10-01 PROCEDURE — ? COUNSELING

## 2020-10-01 PROCEDURE — ? BIOPSY BY SHAVE METHOD

## 2020-10-01 PROCEDURE — ? ADDITIONAL NOTES

## 2020-10-01 ASSESSMENT — LOCATION DETAILED DESCRIPTION DERM
LOCATION DETAILED: LEFT MEDIAL BREAST 8-9:00 REGION
LOCATION DETAILED: LEFT INFERIOR UPPER BACK
LOCATION DETAILED: LEFT LATERAL ABDOMEN
LOCATION DETAILED: RIGHT INFRAMAMMARY CREASE (INNER QUADRANT)
LOCATION DETAILED: LOWER STERNUM
LOCATION DETAILED: RIGHT ANTERIOR PROXIMAL UPPER ARM
LOCATION DETAILED: LEFT PROXIMAL DORSAL FOREARM

## 2020-10-01 ASSESSMENT — LOCATION SIMPLE DESCRIPTION DERM
LOCATION SIMPLE: RIGHT UPPER ARM
LOCATION SIMPLE: LEFT UPPER BACK
LOCATION SIMPLE: RIGHT BREAST
LOCATION SIMPLE: LEFT FOREARM
LOCATION SIMPLE: LEFT BREAST
LOCATION SIMPLE: ABDOMEN
LOCATION SIMPLE: CHEST

## 2020-10-01 ASSESSMENT — LOCATION ZONE DERM
LOCATION ZONE: TRUNK
LOCATION ZONE: ARM

## 2020-10-01 NOTE — PROCEDURE: ADDITIONAL NOTES
Detail Level: Simple
Additional Notes: Mild, worse in summer.  Discussed trial of Zeasorb powder OTC

## 2020-10-01 NOTE — HPI: SKIN LESION
Is This A New Presentation, Or A Follow-Up?: Skin Lesions
What Type Of Note Output Would You Prefer (Optional)?: Standard Output
How Severe Is Your Skin Lesion?: moderate
Has Your Skin Lesion Been Treated?: not been treated
Which Family Member (Optional)?: Uncle

## 2020-10-01 NOTE — PROCEDURE: BIOPSY BY SHAVE METHOD
Detail Level: Detailed
Depth Of Biopsy: dermis
Was A Bandage Applied: Yes
Size Of Lesion In Cm: 0.3
X Size Of Lesion In Cm: 0.2
Biopsy Type: H and E
Biopsy Method: Dermablade
Anesthesia Type: 1% lidocaine with epinephrine
Anesthesia Volume In Cc: 0.5
Additional Anesthesia Volume In Cc (Will Not Render If 0): 0
Hemostasis: Drysol
Wound Care: Petrolatum
Dressing: bandage
Destruction After The Procedure: No
Type Of Destruction Used: Curettage
Curettage Text: The wound bed was treated with curettage after the biopsy was performed.
Cryotherapy Text: The wound bed was treated with cryotherapy after the biopsy was performed.
Electrodesiccation Text: The wound bed was treated with electrodesiccation after the biopsy was performed.
Electrodesiccation And Curettage Text: The wound bed was treated with electrodesiccation and curettage after the biopsy was performed.
Silver Nitrate Text: The wound bed was treated with silver nitrate after the biopsy was performed.
Lab: [id number]
Lab Facility: 
Consent: Written consent was obtained and risks were reviewed including but not limited to scarring, infection, bleeding, scabbing, incomplete removal, nerve damage and allergy to anesthesia.
Post-Care Instructions: I reviewed with the patient in detail post-care instructions. Patient is to keep the biopsy site dry overnight, and then apply bacitracin twice daily until healed. Patient may apply hydrogen peroxide soaks to remove any crusting.
Notification Instructions: Patient will be notified of biopsy results. However, patient instructed to call the office if not contacted within 2 weeks.
Billing Type: Third-Party Bill
Information: Selecting Yes will display possible errors in your note based on the variables you have selected. This validation is only offered as a suggestion for you. PLEASE NOTE THAT THE VALIDATION TEXT WILL BE REMOVED WHEN YOU FINALIZE YOUR NOTE. IF YOU WANT TO FAX A PRELIMINARY NOTE YOU WILL NEED TO TOGGLE THIS TO 'NO' IF YOU DO NOT WANT IT IN YOUR FAXED NOTE.

## 2020-10-01 NOTE — PROCEDURE: BENIGN DESTRUCTION
Post-Care Instructions: I reviewed with the patient in detail post-care instructions. Patient is to wear sunprotection, and avoid picking at any of the treated lesions. Pt may apply Vaseline to crusted or scabbing areas.
Include Z78.9 (Other Specified Conditions Influencing Health Status) As An Associated Diagnosis?: No
Detail Level: Detailed
Medical Necessity Clause: This procedure was medically necessary because the lesions that were treated were:
Medical Necessity Information: It is in your best interest to select a reason for this procedure from the list below. All of these items fulfill various CMS LCD requirements except the new and changing color options.
Anesthesia Volume In Cc: 0.5
Consent: The patient's consent was obtained including but not limited to risks of crusting, scabbing, blistering, scarring, darker or lighter pigmentary change, recurrence, incomplete removal and infection.

## 2021-04-25 ENCOUNTER — OFFICE VISIT (OUTPATIENT)
Dept: URGENT CARE | Facility: PHYSICIAN GROUP | Age: 36
End: 2021-04-25
Payer: COMMERCIAL

## 2021-04-25 ENCOUNTER — HOSPITAL ENCOUNTER (OUTPATIENT)
Dept: RADIOLOGY | Facility: MEDICAL CENTER | Age: 36
End: 2021-04-25
Attending: EMERGENCY MEDICINE
Payer: COMMERCIAL

## 2021-04-25 VITALS
SYSTOLIC BLOOD PRESSURE: 118 MMHG | RESPIRATION RATE: 16 BRPM | OXYGEN SATURATION: 97 % | BODY MASS INDEX: 36.07 KG/M2 | TEMPERATURE: 98.7 F | WEIGHT: 196 LBS | DIASTOLIC BLOOD PRESSURE: 72 MMHG | HEART RATE: 98 BPM | HEIGHT: 62 IN

## 2021-04-25 DIAGNOSIS — M79.672 ACUTE PAIN OF LEFT FOOT: ICD-10-CM

## 2021-04-25 DIAGNOSIS — M19.079 FOOT ARTHROPATHY: ICD-10-CM

## 2021-04-25 PROCEDURE — 73630 X-RAY EXAM OF FOOT: CPT | Mod: LT

## 2021-04-25 PROCEDURE — 99214 OFFICE O/P EST MOD 30 MIN: CPT | Performed by: EMERGENCY MEDICINE

## 2021-04-25 RX ORDER — ACETAMINOPHEN AND CODEINE PHOSPHATE 300; 30 MG/1; MG/1
1 TABLET ORAL EVERY 4 HOURS PRN
Qty: 12 TABLET | Refills: 0 | Status: SHIPPED | OUTPATIENT
Start: 2021-04-25 | End: 2021-04-27

## 2021-04-25 RX ORDER — PREDNISONE 20 MG/1
40 TABLET ORAL
Qty: 14 TABLET | Refills: 0 | Status: SHIPPED | OUTPATIENT
Start: 2021-04-25 | End: 2021-05-12

## 2021-04-25 ASSESSMENT — ENCOUNTER SYMPTOMS
SENSORY CHANGE: 0
MYALGIAS: 0
FOCAL WEAKNESS: 0
NUMBNESS: 0
FEVER: 0
JOINT SWELLING: 1

## 2021-04-25 NOTE — LETTER
April 25, 2021         Patient: Olinda Trevino   YOB: 1985   Date of Visit: 4/25/2021           To Whom it May Concern:    Olinda Trevino was seen in my clinic on 4/25/2021. Please excuse Letty Trevino from work for today.     If you have any questions or concerns, please don't hesitate to call.        Sincerely,           Eyad Bustamante M.D.  Electronically Signed

## 2021-04-25 NOTE — PROGRESS NOTES
"Subjective:      Olinda Trevino is a 35 y.o. female who presents with Foot Pain (thursday pain started, L foot, woke up to throbbing pain )            Foot Problem  This is a new problem. Episode onset: 4 days. The problem occurs daily. The problem has been gradually worsening. Associated symptoms include joint swelling. Pertinent negatives include no fever, myalgias, numbness or rash. The symptoms are aggravated by walking. She has tried NSAIDs for the symptoms. The treatment provided no relief.   No trauma.  Notes upon awakening had left foot pain that has worsened.  PMH significant for urate nephrolithiasis..    Review of Systems   Constitutional: Negative for fever.   Musculoskeletal: Positive for joint swelling. Negative for myalgias.   Skin: Negative for rash.   Neurological: Negative for sensory change, focal weakness and numbness.       History reviewed. No pertinent past medical history.  History reviewed. No pertinent surgical history.   Allergy:  Cefdinir     Current Outpatient Medications:   •  predniSONE, 40 mg, Oral, QDAY PRN  •  Acetaminophen-Codeine, 1 tablet, Oral, Q4HRS PRN  •  ibuprofen, 800 mg, Oral, Q8HRS PRN, Taking  •  Loratadine (CLARITIN PO), Take  by mouth., Taking  •  Prenatal MV-Min-Fe Fum-FA-DHA (PRENATAL 1 PO), Take  by mouth.   family history is not on file.   Social History     Tobacco Use   • Smoking status: Never Smoker   • Smokeless tobacco: Never Used   Substance Use Topics   • Alcohol use: No   • Drug use: No       Objective:     /72   Pulse 98   Temp 37.1 °C (98.7 °F) (Temporal)   Resp 16   Ht 1.575 m (5' 2\")   Wt 88.9 kg (196 lb)   SpO2 97%   BMI 35.85 kg/m²      Physical Exam  Constitutional:       General: She is not in acute distress.     Appearance: She is well-developed and overweight. She is not ill-appearing.   Cardiovascular:      Pulses:           Dorsalis pedis pulses are 2+ on the left side.        Posterior tibial pulses are 2+ on the left " side.   Musculoskeletal:      Left lower leg: No edema.      Left ankle: Normal.      Left Achilles Tendon: Normal.      Left foot: Decreased range of motion. Swelling and tenderness present. No deformity or crepitus.      Comments: Maximal tenderness at fourth CMT joint.  No tendon function deficit   Skin:     General: Skin is warm and dry.      Findings: No rash or wound.   Neurological:      Mental Status: She is alert.      Comments: Distal motor function intact. Distal sensation to light touch and pressure intact.   Psychiatric:         Behavior: Behavior is cooperative.                 Assessment/Plan:        1. Foot arthropathy  Rx prednisone  Rx Tylenol #3  REF PCP   2. Acute pain of left foot  - DX-FOOT-COMPLETE 3+ LEFT; Interpretation per radiologist:   No evidence of acute fracture or dislocation.  Mild soft tissue swelling of the dorsal foot.

## 2021-04-26 ENCOUNTER — TELEPHONE (OUTPATIENT)
Dept: SCHEDULING | Facility: IMAGING CENTER | Age: 36
End: 2021-04-26

## 2021-05-12 ENCOUNTER — OFFICE VISIT (OUTPATIENT)
Dept: MEDICAL GROUP | Facility: PHYSICIAN GROUP | Age: 36
End: 2021-05-12
Payer: COMMERCIAL

## 2021-05-12 VITALS
OXYGEN SATURATION: 95 % | TEMPERATURE: 98.6 F | RESPIRATION RATE: 18 BRPM | BODY MASS INDEX: 36.07 KG/M2 | SYSTOLIC BLOOD PRESSURE: 118 MMHG | WEIGHT: 196 LBS | DIASTOLIC BLOOD PRESSURE: 80 MMHG | HEART RATE: 100 BPM | HEIGHT: 62 IN

## 2021-05-12 DIAGNOSIS — J30.2 SEASONAL ALLERGIES: ICD-10-CM

## 2021-05-12 DIAGNOSIS — Z00.00 WELLNESS EXAMINATION: ICD-10-CM

## 2021-05-12 DIAGNOSIS — E66.9 OBESITY (BMI 30-39.9): ICD-10-CM

## 2021-05-12 PROCEDURE — 99213 OFFICE O/P EST LOW 20 MIN: CPT | Performed by: NURSE PRACTITIONER

## 2021-05-12 ASSESSMENT — PATIENT HEALTH QUESTIONNAIRE - PHQ9: CLINICAL INTERPRETATION OF PHQ2 SCORE: 0

## 2021-05-12 NOTE — ASSESSMENT & PLAN NOTE
Chronic and ongoing. Currently takes Claritin everyday. She states this helps manage her allergies. She usually takes it all year round.

## 2021-05-12 NOTE — ASSESSMENT & PLAN NOTE
Chronic and ongoing. She states that she has been working out more every day. She is trying to work on being better about it. She is eating better each day.

## 2021-05-12 NOTE — PROGRESS NOTES
Subjective  Chief Complaint  Establish care to manage her chronic conditions    History of Present Illness  Olinda Trevino is a 35 y.o. female. This patient is here today to establish care.    Seasonal allergies  Chronic and ongoing. Currently takes Claritin everyday. She states this helps manage her allergies. She usually takes it all year round.    Obesity (BMI 30-39.9)  Chronic and ongoing. She states that she has been working out more every day. She is trying to work on being better about it. She is eating better each day.    Past Medical History    Allergies: Cefdinir  Past Medical History:   Diagnosis Date   • Labor abnormal 4/13/2017     History reviewed. No pertinent surgical history.  Current Outpatient Medications Ordered in Epic   Medication Sig Dispense Refill   • Loratadine (CLARITIN PO) Take  by mouth.       No current Epic-ordered facility-administered medications on file.     Family History:    Family History   Problem Relation Age of Onset   • Hypertension Mother    • Cancer Maternal Uncle    • Diabetes Maternal Uncle    • Lung Disease Neg Hx    • Stroke Neg Hx       Personal/Social History:    Social History     Tobacco Use   • Smoking status: Never Smoker   • Smokeless tobacco: Never Used   Vaping Use   • Vaping Use: Never used   Substance Use Topics   • Alcohol use: No   • Drug use: No     Social History     Social History Narrative   • Not on file      Review of Systems:     General: Negative for fever/chills and unexpected weight change.    Eyes:  Negative for vision changes, eye pain.   ENT:  Negative for hearing changes, ear pain, congestion, sore throat, and neck pain.    Respiratory:  Negative for cough and dyspnea.     Cardiovascular:  Negative for chest pain and palpitations.   Gastrointestinal:  Negative for nausea/vomiting, changes in bowel habits, and abdominal pain.    Genitourinary:  Negative for dysuria and hematuria.    Musculoskeletal:  Negative for myalgias.    Skin:   "Negative for rash.    Neurological:  Negative for numbness/tingling and headaches.    Heme/Lymph:  Does not bruise/bleed easily.     Objective  Physical Exam:   /80 (BP Location: Right arm, Patient Position: Sitting, BP Cuff Size: Large adult)   Pulse 100   Temp 37 °C (98.6 °F) (Temporal)   Resp 18   Ht 1.575 m (5' 2\")   Wt 88.9 kg (196 lb)   SpO2 95%  Body mass index is 35.85 kg/m².  General:  Alert and oriented.  Well appearing.  NAD.  Head:  Normocephalic.   Neck: Supple without JVD. No lymphadenopathy.  Pulmonary:  Normal effort.  Clear to ausculation without rales, ronchi, or wheezing.  Cardiovascular:  Regular rate and rhythm without murmur, rubs or gallop.  Radial pulses are intact and equal bilaterally.  Gastrointestinal: Abdomen soft, nontender, nondistended. Normal bowel sounds.   Musculoskeletal:  No extremity cyanosis, clubbing, or edema.  Skin:  Warm and dry.  No obvious lesions.  Psych: Normal mood and affect. Alert and oriented x3. Judgment and insight is normal.      Assessment/Plan   1. Seasonal allergies  Chronic and ongoing.  Continue to take Claritin daily.    2. Obesity (BMI 30-39.9)  Chronic and ongoing.  Educated to on a healthy diet and exercise.    3. Wellness examination  Due for updated labs.  - Comp Metabolic Panel; Future  - Lipid Profile; Future      Health Maintenance: Completed    Return in about 1 year (around 5/12/2022), or if symptoms worsen or fail to improve.    Please note that this dictation was created using voice recognition software. I have made every reasonable attempt to correct obvious errors, but I expect that there are errors of grammar and possibly content that I did not discover before finalizing the note.    CRISTIAN Hughes  [facility]  "

## 2021-05-13 ENCOUNTER — HOSPITAL ENCOUNTER (OUTPATIENT)
Dept: LAB | Facility: MEDICAL CENTER | Age: 36
End: 2021-05-13
Attending: NURSE PRACTITIONER
Payer: COMMERCIAL

## 2021-05-13 DIAGNOSIS — Z00.00 WELLNESS EXAMINATION: ICD-10-CM

## 2021-05-13 LAB
ALBUMIN SERPL BCP-MCNC: 4.7 G/DL (ref 3.2–4.9)
ALBUMIN/GLOB SERPL: 1.5 G/DL
ALP SERPL-CCNC: 80 U/L (ref 30–99)
ALT SERPL-CCNC: 21 U/L (ref 2–50)
ANION GAP SERPL CALC-SCNC: 13 MMOL/L (ref 7–16)
AST SERPL-CCNC: 13 U/L (ref 12–45)
BILIRUB SERPL-MCNC: 0.4 MG/DL (ref 0.1–1.5)
BUN SERPL-MCNC: 13 MG/DL (ref 8–22)
CALCIUM SERPL-MCNC: 9.8 MG/DL (ref 8.5–10.5)
CHLORIDE SERPL-SCNC: 104 MMOL/L (ref 96–112)
CHOLEST SERPL-MCNC: 300 MG/DL (ref 100–199)
CO2 SERPL-SCNC: 22 MMOL/L (ref 20–33)
CREAT SERPL-MCNC: 0.57 MG/DL (ref 0.5–1.4)
FASTING STATUS PATIENT QL REPORTED: NORMAL
GLOBULIN SER CALC-MCNC: 3.2 G/DL (ref 1.9–3.5)
GLUCOSE SERPL-MCNC: 119 MG/DL (ref 65–99)
HDLC SERPL-MCNC: 43 MG/DL
LDLC SERPL CALC-MCNC: 218 MG/DL
POTASSIUM SERPL-SCNC: 4.5 MMOL/L (ref 3.6–5.5)
PROT SERPL-MCNC: 7.9 G/DL (ref 6–8.2)
SODIUM SERPL-SCNC: 139 MMOL/L (ref 135–145)
TRIGL SERPL-MCNC: 194 MG/DL (ref 0–149)

## 2021-05-13 PROCEDURE — 80053 COMPREHEN METABOLIC PANEL: CPT

## 2021-05-13 PROCEDURE — 80061 LIPID PANEL: CPT

## 2021-05-13 PROCEDURE — 36415 COLL VENOUS BLD VENIPUNCTURE: CPT

## 2021-11-02 ENCOUNTER — HOSPITAL ENCOUNTER (OUTPATIENT)
Dept: RADIOLOGY | Facility: MEDICAL CENTER | Age: 36
End: 2021-11-02
Attending: UROLOGY
Payer: COMMERCIAL

## 2021-11-02 DIAGNOSIS — N20.0 CALCULUS OF KIDNEY: ICD-10-CM

## 2021-11-02 PROCEDURE — 74176 CT ABD & PELVIS W/O CONTRAST: CPT

## 2021-11-16 DIAGNOSIS — E78.2 ELEVATED TRIGLYCERIDES WITH HIGH CHOLESTEROL: ICD-10-CM

## 2021-12-13 ENCOUNTER — HOSPITAL ENCOUNTER (OUTPATIENT)
Facility: MEDICAL CENTER | Age: 36
End: 2021-12-13
Attending: UROLOGY
Payer: COMMERCIAL

## 2021-12-13 PROCEDURE — 82365 CALCULUS SPECTROSCOPY: CPT

## 2021-12-18 LAB
APPEARANCE STONE: NORMAL
COMPN STONE: NORMAL
SPECIMEN WT: 15 MG

## 2022-02-22 ENCOUNTER — OFFICE VISIT (OUTPATIENT)
Dept: URGENT CARE | Facility: PHYSICIAN GROUP | Age: 37
End: 2022-02-22
Payer: COMMERCIAL

## 2022-02-22 ENCOUNTER — HOSPITAL ENCOUNTER (OUTPATIENT)
Facility: MEDICAL CENTER | Age: 37
End: 2022-02-22
Attending: PHYSICIAN ASSISTANT
Payer: COMMERCIAL

## 2022-02-22 VITALS
RESPIRATION RATE: 16 BRPM | TEMPERATURE: 98 F | BODY MASS INDEX: 31.89 KG/M2 | DIASTOLIC BLOOD PRESSURE: 88 MMHG | SYSTOLIC BLOOD PRESSURE: 128 MMHG | HEIGHT: 63 IN | HEART RATE: 100 BPM | WEIGHT: 180 LBS | OXYGEN SATURATION: 95 %

## 2022-02-22 DIAGNOSIS — J02.9 PHARYNGITIS, UNSPECIFIED ETIOLOGY: ICD-10-CM

## 2022-02-22 LAB
INT CON NEG: NEGATIVE
INT CON POS: NORMAL
S PYO AG THROAT QL: NORMAL

## 2022-02-22 PROCEDURE — 87070 CULTURE OTHR SPECIMN AEROBIC: CPT

## 2022-02-22 PROCEDURE — U0005 INFEC AGEN DETEC AMPLI PROBE: HCPCS

## 2022-02-22 PROCEDURE — 99214 OFFICE O/P EST MOD 30 MIN: CPT | Performed by: PHYSICIAN ASSISTANT

## 2022-02-22 PROCEDURE — U0003 INFECTIOUS AGENT DETECTION BY NUCLEIC ACID (DNA OR RNA); SEVERE ACUTE RESPIRATORY SYNDROME CORONAVIRUS 2 (SARS-COV-2) (CORONAVIRUS DISEASE [COVID-19]), AMPLIFIED PROBE TECHNIQUE, MAKING USE OF HIGH THROUGHPUT TECHNOLOGIES AS DESCRIBED BY CMS-2020-01-R: HCPCS

## 2022-02-22 PROCEDURE — 87880 STREP A ASSAY W/OPTIC: CPT | Performed by: PHYSICIAN ASSISTANT

## 2022-02-22 RX ORDER — LORATADINE 10 MG/1
10 TABLET ORAL DAILY
COMMUNITY
End: 2022-03-31

## 2022-02-22 RX ORDER — DEXAMETHASONE SODIUM PHOSPHATE 4 MG/ML
8 INJECTION, SOLUTION INTRA-ARTICULAR; INTRALESIONAL; INTRAMUSCULAR; INTRAVENOUS; SOFT TISSUE ONCE
Status: COMPLETED | OUTPATIENT
Start: 2022-02-22 | End: 2022-02-22

## 2022-02-22 RX ADMIN — DEXAMETHASONE SODIUM PHOSPHATE 8 MG: 4 INJECTION, SOLUTION INTRA-ARTICULAR; INTRALESIONAL; INTRAMUSCULAR; INTRAVENOUS; SOFT TISSUE at 16:04

## 2022-02-22 ASSESSMENT — ENCOUNTER SYMPTOMS
MYALGIAS: 0
COUGH: 0
FEVER: 0
CHILLS: 0
SORE THROAT: 1

## 2022-02-22 NOTE — PROGRESS NOTES
"  Subjective:   Olinda Trevino is a 36 y.o. female who presents today with   Chief Complaint   Patient presents with   • Pharyngitis     Onset yesterday       Pharyngitis   This is a new problem. The current episode started yesterday. The problem has been unchanged. Pertinent negatives include no coughing. Treatments tried: Dayquil. The treatment provided mild relief.     I personally donned proper PPE throughout visit today.     PMH:  has a past medical history of Labor abnormal (4/13/2017).  MEDS: No current outpatient medications on file.    Current Facility-Administered Medications:   •  dexamethasone (DECADRON) injection 8 mg, 8 mg, Oral, Once, Brian Mccoy P.A.-C.  ALLERGIES:   Allergies   Allergen Reactions   • Cefdinir Rash     Hands and feet broke out in rash, took benadryl      SURGHX: History reviewed. No pertinent surgical history.  SOCHX:  reports that she has never smoked. She has never used smokeless tobacco. She reports that she does not drink alcohol and does not use drugs.  FH: Reviewed with patient, not pertinent to this visit.       Review of Systems   Constitutional: Negative for chills, fever and malaise/fatigue.   HENT: Positive for sore throat.    Respiratory: Negative for cough.    Musculoskeletal: Negative for myalgias.        Objective:   /88   Pulse 100   Temp 36.7 °C (98 °F)   Resp 16   Ht 1.6 m (5' 3\")   Wt 81.6 kg (180 lb)   SpO2 95%   BMI 31.89 kg/m²   Physical Exam  Vitals and nursing note reviewed.   Constitutional:       General: She is not in acute distress.     Appearance: Normal appearance. She is well-developed. She is not ill-appearing or toxic-appearing.   HENT:      Head: Normocephalic and atraumatic.      Right Ear: Hearing, tympanic membrane and ear canal normal.      Left Ear: Hearing, tympanic membrane and ear canal normal.      Mouth/Throat:      Pharynx: Posterior oropharyngeal erythema present. No oropharyngeal exudate.   Cardiovascular:      " Rate and Rhythm: Normal rate and regular rhythm.      Heart sounds: Normal heart sounds.   Pulmonary:      Effort: Pulmonary effort is normal.      Breath sounds: Normal breath sounds. No stridor. No wheezing, rhonchi or rales.   Musculoskeletal:      Comments: Normal movement in all 4 extremities   Lymphadenopathy:      Cervical: No cervical adenopathy.   Skin:     General: Skin is warm and dry.   Neurological:      Mental Status: She is alert.      Coordination: Coordination normal.   Psychiatric:         Mood and Affect: Mood normal.       STREP A NEGATIVE    Assessment/Plan:   Assessment    1. Pharyngitis, unspecified etiology  - POCT Rapid Strep A  - CULTURE THROAT; Future  - SARS-CoV-2 PCR (24 hour In-House): Collect NP swab in VTM; Future  - dexamethasone (DECADRON) injection 8 mg  No signs of bacterial infection at this time and no indication for antibiotics but we will follow-up with throat culture and treat accordingly if needed at that time.  Discussed CDC guidelines including self isolation at home.     Offered Decadron for patient's symptoms today and patient agrees with plan.  Differential diagnosis, natural history, supportive care, and indications for immediate follow-up discussed.  Patient tolerated Decadron in clinic today.  Patient given instructions and understanding of medications and treatment.    If not improving in 3-5 days, F/U with PCP or return to  if symptoms worsen.    Patient agreeable to plan.      Please note that this dictation was created using voice recognition software. I have made every reasonable attempt to correct obvious errors, but I expect that there are errors of grammar and possibly content that I did not discover before finalizing the note.    Brian Mccoy PA-C

## 2022-02-23 DIAGNOSIS — J02.9 PHARYNGITIS, UNSPECIFIED ETIOLOGY: ICD-10-CM

## 2022-02-24 DIAGNOSIS — J02.9 PHARYNGITIS, UNSPECIFIED ETIOLOGY: ICD-10-CM

## 2022-02-24 LAB
COVID ORDER STATUS COVID19: NORMAL
SARS-COV-2 RNA RESP QL NAA+PROBE: NOTDETECTED
SPECIMEN SOURCE: NORMAL

## 2022-02-25 LAB
BACTERIA SPEC RESP CULT: NORMAL
SIGNIFICANT IND 70042: NORMAL
SITE SITE: NORMAL
SOURCE SOURCE: NORMAL

## 2022-03-20 ENCOUNTER — OFFICE VISIT (OUTPATIENT)
Dept: URGENT CARE | Facility: PHYSICIAN GROUP | Age: 37
End: 2022-03-20
Payer: COMMERCIAL

## 2022-03-20 VITALS
HEIGHT: 63 IN | DIASTOLIC BLOOD PRESSURE: 80 MMHG | HEART RATE: 102 BPM | OXYGEN SATURATION: 98 % | TEMPERATURE: 98.7 F | RESPIRATION RATE: 16 BRPM | BODY MASS INDEX: 33.66 KG/M2 | WEIGHT: 190 LBS | SYSTOLIC BLOOD PRESSURE: 122 MMHG

## 2022-03-20 DIAGNOSIS — H01.119 EYELID DERMATITIS, ALLERGIC/CONTACT: ICD-10-CM

## 2022-03-20 PROCEDURE — 99213 OFFICE O/P EST LOW 20 MIN: CPT | Performed by: PHYSICIAN ASSISTANT

## 2022-03-20 RX ORDER — ERYTHROMYCIN 5 MG/G
OINTMENT OPHTHALMIC
Qty: 3.5 G | Refills: 0 | Status: SHIPPED | OUTPATIENT
Start: 2022-03-20 | End: 2022-03-31

## 2022-03-20 NOTE — PROGRESS NOTES
"Subjective:   Olinda Trevino is a 36 y.o. female who presents for Conjunctivitis (X2 weeks)      HPI  Patient is a 36-year-old female who presents to clinic with complaints of inflamed eyelids onset approximately 2 weeks ago.  She thought this may have been due to the glue from her false eyelashes.  She had her false eyelashes and the glue removed.  She has tried over-the-counter cortisone cream which has helped.  She has tried Benadryl which has helped.  She still has mild redness and occasional itching and irritation.  Sometimes her eyelids are crusty yellow/white in color in the mornings.  No vision changes.  Patient son here in the clinic with URI symptoms.      Medications:    • loratadine Tabs    Allergies: Cefdinir    Problem List: Olinda Trevino does not have any pertinent problems on file.    Surgical History:  No past surgical history on file.    Past Social Hx: Olinda Trevino  reports that she has never smoked. She has never used smokeless tobacco. She reports that she does not drink alcohol and does not use drugs.     Past Family Hx:  Olinda Trevino family history includes Cancer in her maternal uncle; Diabetes in her maternal uncle; Hypertension in her mother.     Problem list, medications, and allergies reviewed by myself today in Epic.     Objective:     /80 (BP Location: Right arm, Patient Position: Sitting, BP Cuff Size: Adult)   Pulse (!) 102   Temp 37.1 °C (98.7 °F)   Resp 16   Ht 1.6 m (5' 3\")   Wt 86.2 kg (190 lb)   SpO2 98%   BMI 33.66 kg/m²     Physical Exam  Vitals reviewed.   Constitutional:       General: She is not in acute distress.     Appearance: Normal appearance. She is not ill-appearing or toxic-appearing.   Eyes:      Conjunctiva/sclera: Conjunctivae normal.      Pupils: Pupils are equal, round, and reactive to light.      Comments: Minimal erythema to the upper eyelids.  Negative edema.  No crusting or drainage.  Normal " conjunctiva.  No signs of hordeolum.  No periorbital erythema or edema.   Cardiovascular:      Rate and Rhythm: Normal rate.   Pulmonary:      Effort: Pulmonary effort is normal.   Musculoskeletal:      Cervical back: Neck supple.   Skin:     General: Skin is warm and dry.   Neurological:      General: No focal deficit present.      Mental Status: She is alert and oriented to person, place, and time.   Psychiatric:         Mood and Affect: Mood normal.         Behavior: Behavior normal.         Diagnosis and associated orders:     1. Eyelid dermatitis, allergic/contact  - erythromycin 5 MG/GM Ointment; Apply directly to lid margin once daily at bedtime for two weeks or until improvement of symptoms.  Dispense: 3.5 g; Refill: 0       Comments/MDM:     • The patient's presenting symptoms and exam findings most likely consistent with an eyelid dermatitis secondary to false eyelashes.  Differential diagnosis of blepharitis.  Patient does have over-the-counter hydrocortisone cream.  Encourage this twice daily for no longer than 14 days.  Only a thin layer and avoid contact with the eyes.  Erythromycin to the area once daily at bedtime.  Warm compresses.  Avoid cosmetics or make-up or false eyelashes.       I personally reviewed prior external notes and test results pertinent to today's visit. . Supportive care, natural history, differential diagnoses, and indications for immediate follow-up discussed. Patient expresses understanding and agrees to plan. Patient denies any other questions or concerns.     Follow-up with the primary care physician for recheck, reevaluation, and consideration of further management.    Please note that this dictation was created using voice recognition software. I have made a reasonable attempt to correct obvious errors, but I expect that there are errors of grammar and possibly content that I did not discover before finalizing the note.    This note was electronically signed by Leon Elder  SAULO

## 2022-03-24 ENCOUNTER — OFFICE VISIT (OUTPATIENT)
Dept: URGENT CARE | Facility: PHYSICIAN GROUP | Age: 37
End: 2022-03-24
Payer: COMMERCIAL

## 2022-03-24 VITALS
OXYGEN SATURATION: 95 % | SYSTOLIC BLOOD PRESSURE: 128 MMHG | HEART RATE: 110 BPM | TEMPERATURE: 97.4 F | RESPIRATION RATE: 18 BRPM | WEIGHT: 185 LBS | BODY MASS INDEX: 32.78 KG/M2 | HEIGHT: 63 IN | DIASTOLIC BLOOD PRESSURE: 82 MMHG

## 2022-03-24 DIAGNOSIS — J32.9 RHINOSINUSITIS: ICD-10-CM

## 2022-03-24 PROCEDURE — 99213 OFFICE O/P EST LOW 20 MIN: CPT | Performed by: PHYSICIAN ASSISTANT

## 2022-03-24 RX ORDER — AMOXICILLIN AND CLAVULANATE POTASSIUM 875; 125 MG/1; MG/1
1 TABLET, FILM COATED ORAL 2 TIMES DAILY
Qty: 14 TABLET | Refills: 0 | Status: SHIPPED | OUTPATIENT
Start: 2022-03-24 | End: 2022-03-31

## 2022-03-24 ASSESSMENT — ENCOUNTER SYMPTOMS
SINUS PRESSURE: 1
COUGH: 0
CHILLS: 0
FEVER: 0
SINUS PAIN: 1

## 2022-03-25 NOTE — PROGRESS NOTES
"  Subjective:   Olinda Trevino is a 36 y.o. female who presents today with   Chief Complaint   Patient presents with   • Nasal Congestion     X 4 days sinus pressure and pain     Sinus Problem  This is a new problem. The current episode started in the past 7 days. The problem has been gradually worsening since onset. There has been no fever. Associated symptoms include congestion and sinus pressure. Pertinent negatives include no chills or coughing. Treatments tried: Floanse, decongestant, claritin. The treatment provided no relief.     PMH:  has a past medical history of Labor abnormal (4/13/2017).  MEDS:   Current Outpatient Medications:   •  amoxicillin-clavulanate (AUGMENTIN) 875-125 MG Tab, Take 1 Tablet by mouth 2 times a day for 7 days., Disp: 14 Tablet, Rfl: 0  •  erythromycin 5 MG/GM Ointment, Apply directly to lid margin once daily at bedtime for two weeks or until improvement of symptoms. (Patient not taking: Reported on 3/24/2022), Disp: 3.5 g, Rfl: 0  •  loratadine (CLARITIN) 10 MG Tab, Take 10 mg by mouth every day. (Patient not taking: Reported on 3/24/2022), Disp: , Rfl:   ALLERGIES:   Allergies   Allergen Reactions   • Cefdinir Rash     Hands and feet broke out in rash, took benadryl      SURGHX: No past surgical history on file.  SOCHX:  reports that she has never smoked. She has never used smokeless tobacco. She reports that she does not drink alcohol and does not use drugs.  FH: Reviewed with patient, not pertinent to this visit.       Review of Systems   Constitutional: Negative for chills and fever.   HENT: Positive for congestion, sinus pressure and sinus pain.    Respiratory: Negative for cough.         Objective:   /82 (BP Location: Left arm, Patient Position: Sitting, BP Cuff Size: Large adult)   Pulse (!) 110   Temp 36.3 °C (97.4 °F) (Temporal)   Resp 18   Ht 1.6 m (5' 3\")   Wt 83.9 kg (185 lb)   LMP 03/01/2022 (Approximate)   SpO2 95%   Breastfeeding No   BMI " 32.77 kg/m²   Physical Exam  Vitals and nursing note reviewed.   Constitutional:       General: She is not in acute distress.     Appearance: Normal appearance. She is well-developed. She is not ill-appearing or toxic-appearing.   HENT:      Head: Normocephalic and atraumatic.      Right Ear: Hearing normal.      Left Ear: Hearing normal. A middle ear effusion is present. Tympanic membrane is not erythematous.      Nose: Mucosal edema and congestion present.   Eyes:      Pupils: Pupils are equal, round, and reactive to light.   Cardiovascular:      Rate and Rhythm: Normal rate and regular rhythm.      Heart sounds: Normal heart sounds.   Pulmonary:      Effort: Pulmonary effort is normal.      Breath sounds: Normal breath sounds.   Musculoskeletal:      Comments: Normal movement in all 4 extremities   Skin:     General: Skin is warm and dry.   Neurological:      Mental Status: She is alert.      Coordination: Coordination normal.   Psychiatric:         Mood and Affect: Mood normal.           Assessment/Plan:   Assessment    1. Rhinosinusitis  - amoxicillin-clavulanate (AUGMENTIN) 875-125 MG Tab; Take 1 Tablet by mouth 2 times a day for 7 days.  Dispense: 14 Tablet; Refill: 0  Discussed with patient would recommend continuing over-the-counter remedies at this time for the next few days but symptoms are consistent with developing sinus infection.  She can start on antibiotics if symptoms do not improve over the next several days.  Patient was given a contingent antibiotic prescription to fill and use as directed if symptoms progressed as discussed and agreed upon.    Differential diagnosis, natural history, supportive care, and indications for immediate follow-up discussed.   Patient given instructions and understanding of medications and treatment.    If not improving in 3-5 days, F/U with PCP or return to  if symptoms worsen.    Patient agreeable to plan.    Please note that this dictation was created using voice  recognition software. I have made every reasonable attempt to correct obvious errors, but I expect that there are errors of grammar and possibly content that I did not discover before finalizing the note.    Brian Mccoy PA-C

## 2022-03-31 ENCOUNTER — OFFICE VISIT (OUTPATIENT)
Dept: URGENT CARE | Facility: PHYSICIAN GROUP | Age: 37
End: 2022-03-31
Payer: COMMERCIAL

## 2022-03-31 VITALS
DIASTOLIC BLOOD PRESSURE: 72 MMHG | HEART RATE: 121 BPM | SYSTOLIC BLOOD PRESSURE: 120 MMHG | TEMPERATURE: 98.8 F | HEIGHT: 63 IN | BODY MASS INDEX: 32.78 KG/M2 | RESPIRATION RATE: 16 BRPM | OXYGEN SATURATION: 98 % | WEIGHT: 185 LBS

## 2022-03-31 DIAGNOSIS — J01.90 ACUTE RHINOSINUSITIS: ICD-10-CM

## 2022-03-31 DIAGNOSIS — J06.9 UPPER RESPIRATORY TRACT INFECTION, UNSPECIFIED TYPE: ICD-10-CM

## 2022-03-31 DIAGNOSIS — R05.9 COUGH: ICD-10-CM

## 2022-03-31 PROCEDURE — 99214 OFFICE O/P EST MOD 30 MIN: CPT | Performed by: NURSE PRACTITIONER

## 2022-03-31 RX ORDER — BENZONATATE 100 MG/1
CAPSULE ORAL
Qty: 60 CAPSULE | Refills: 0 | Status: SHIPPED | OUTPATIENT
Start: 2022-03-31 | End: 2022-08-06

## 2022-03-31 RX ORDER — AMOXICILLIN AND CLAVULANATE POTASSIUM 875; 125 MG/1; MG/1
1 TABLET, FILM COATED ORAL 2 TIMES DAILY
Qty: 6 TABLET | Refills: 0 | Status: SHIPPED | OUTPATIENT
Start: 2022-03-31 | End: 2022-04-03

## 2022-03-31 NOTE — PROGRESS NOTES
Olinda Trevino is a 36 y.o. female who presents for Nasal Congestion (Was here last week and was put on antibiotics, sore throat, R ear fullness, isnt getting any better.)      HPI this new problem.  Olinda is a 36-year-old female who presents with complaints of feeling like her sinuses are draining. She has been taking the medication that she was prescribed and is not improving. Right ear is now painful.  She reports that her symptoms were initially improving but now seem to be getting worse.  She feels more congestion in her head.  She has a cough that keeps her awake at night.  She reports her cough is from drainage.  He says she can clear the drainage then she is able to sleep.  Treatment: claritin D, augmentin, flonase.   Her 3 y/o has similar symptoms and has been treated by his pediatrician. Olinda got sick after he got sick.     ROS    Allergies:       Allergies   Allergen Reactions   • Cefdinir Rash     Hands and feet broke out in rash, took benadryl        PMSFS Hx:  Past Medical History:   Diagnosis Date   • Labor abnormal 4/13/2017     No past surgical history on file.  Family History   Problem Relation Age of Onset   • Hypertension Mother    • Cancer Maternal Uncle    • Diabetes Maternal Uncle    • Lung Disease Neg Hx    • Stroke Neg Hx      Social History     Tobacco Use   • Smoking status: Never Smoker   • Smokeless tobacco: Never Used   Substance Use Topics   • Alcohol use: No       Problems:   Patient Active Problem List   Diagnosis   • Obesity (BMI 30-39.9)   • Seasonal allergies   • Elevated triglycerides with high cholesterol       Medications:   Current Outpatient Medications on File Prior to Visit   Medication Sig Dispense Refill   • amoxicillin-clavulanate (AUGMENTIN) 875-125 MG Tab Take 1 Tablet by mouth 2 times a day for 7 days. 14 Tablet 0     No current facility-administered medications on file prior to visit.          Objective:     /72   Pulse (!) 121   Temp 37.1 °C  "(98.8 °F)   Resp 16   Ht 1.6 m (5' 3\")   Wt 83.9 kg (185 lb)   LMP 03/01/2022 (Approximate)   SpO2 98%   BMI 32.77 kg/m²     Physical Exam  Vitals and nursing note reviewed.   Constitutional:       General: She is not in acute distress.     Appearance: Normal appearance. She is well-developed, well-groomed and normal weight. She is not diaphoretic.   HENT:      Head: Normocephalic.      Right Ear: Hearing, tympanic membrane, ear canal and external ear normal.      Left Ear: Hearing, tympanic membrane, ear canal and external ear normal.      Nose: Congestion and rhinorrhea present. No mucosal edema.      Right Sinus: Frontal sinus tenderness present. No maxillary sinus tenderness.      Left Sinus: Frontal sinus tenderness present. No maxillary sinus tenderness.      Mouth/Throat:      Lips: Pink.      Mouth: Mucous membranes are moist.      Pharynx: Uvula midline. Oropharyngeal exudate present. No posterior oropharyngeal erythema.   Eyes:      Conjunctiva/sclera:      Right eye: Right conjunctiva is injected.      Left eye: Left conjunctiva is injected.      Pupils: Pupils are equal, round, and reactive to light.   Neck:      Trachea: Trachea and phonation normal.   Cardiovascular:      Rate and Rhythm: Normal rate and regular rhythm.      Chest Wall: PMI is not displaced.      Pulses: Normal pulses.      Heart sounds: Normal heart sounds.   Pulmonary:      Effort: Pulmonary effort is normal.      Breath sounds: Normal breath sounds.   Chest:   Breasts:      Right: No supraclavicular adenopathy.      Left: No supraclavicular adenopathy.       Musculoskeletal:      Cervical back: Full passive range of motion without pain, normal range of motion and neck supple.   Lymphadenopathy:      Cervical: No cervical adenopathy.      Upper Body:      Right upper body: No supraclavicular adenopathy.      Left upper body: No supraclavicular adenopathy.   Skin:     General: Skin is warm and dry.      Capillary Refill: " Capillary refill takes less than 2 seconds.   Neurological:      Mental Status: She is alert and oriented to person, place, and time.      Gait: Gait normal.   Psychiatric:         Mood and Affect: Mood normal.         Speech: Speech normal.         Behavior: Behavior normal. Behavior is cooperative.         Thought Content: Thought content normal.         Judgment: Judgment normal.         Assessment /Associated Orders:      1. Acute rhinosinusitis  amoxicillin-clavulanate (AUGMENTIN) 875-125 MG Tab   2. Cough  benzonatate (TESSALON) 100 MG Cap   3. Upper respiratory tract infection, unspecified type           Medical Decision Making:    Pt is clinically stable at today's acute urgent care visit.  No acute distress noted. Appropriate for outpatient management at this time.   Acute problem today with uncertain prognosis.   I will extend her Augmentin to 10 day course - she has had some improvement overall yet feeling like she is not getting better too. This was discussed at length. The possibility of this being a viral sinusitis was also discussed.   Educated in proper administration of medication(s) ordered today including safety, possible SE, risks, benefits, rationale and alternatives to therapy.   OTC antihistamine of choice. Follow manufactures dosing and safety guidelines.   OTC decongestant of choice. Follow manufacturers guidelines for dosing and safety precautions.    Humidifier at night prn    OTC Saline irrigations or Neti pot rinse. Follow manufacturers recommendations.     Return for reevaluation if any of the following symptoms or if symptoms are not improving to her satisfaction:   • Fever higher than 102.5°F (39.2°C)   • Sudden and severe pain in the face and head   • Trouble seeing or seeing double   • Trouble thinking clearly   • Swelling or redness around 1 or both eyes   • Trouble breathing or a stiff neck        Advised to follow-up with the primary care provider for recheck, reevaluation, and  consideration of further management if necessary.   Discussed management options (risks,benefits, and alternatives to treatment). Expressed understanding and the treatment plan was agreed upon. Questions were encouraged and answered   Return to urgent care prn if new or worsening sx or if there is no improvement in condition prn.    Educated in Red flags and indications to immediately call 911 or present to the Emergency Department.     I personally reviewed prior external notes and test results pertinent to today's visit.  I have independently reviewed and interpreted all diagnostics ordered during this urgent care acute visit.   Time spent evaluating this patient was at least 30 minutes and includes preparing for visit, counseling/education, exam and evaluation, obtaining history, independent interpretation, ordering lab/test/procedures,medication management and documentation.Time does not include separately billable procedures noted .

## 2022-08-06 ENCOUNTER — OFFICE VISIT (OUTPATIENT)
Dept: URGENT CARE | Facility: PHYSICIAN GROUP | Age: 37
End: 2022-08-06
Payer: COMMERCIAL

## 2022-08-06 VITALS
DIASTOLIC BLOOD PRESSURE: 32 MMHG | OXYGEN SATURATION: 98 % | SYSTOLIC BLOOD PRESSURE: 128 MMHG | BODY MASS INDEX: 33.13 KG/M2 | HEART RATE: 112 BPM | WEIGHT: 187 LBS | HEIGHT: 63 IN | TEMPERATURE: 97.3 F | RESPIRATION RATE: 16 BRPM

## 2022-08-06 DIAGNOSIS — J06.9 VIRAL URI WITH COUGH: ICD-10-CM

## 2022-08-06 DIAGNOSIS — R05.9 COUGH: ICD-10-CM

## 2022-08-06 DIAGNOSIS — H65.192 ACUTE OTITIS MEDIA WITH EFFUSION OF LEFT EAR: ICD-10-CM

## 2022-08-06 LAB
EXTERNAL QUALITY CONTROL: NORMAL
SARS-COV+SARS-COV-2 AG RESP QL IA.RAPID: NEGATIVE

## 2022-08-06 PROCEDURE — 87426 SARSCOV CORONAVIRUS AG IA: CPT | Performed by: NURSE PRACTITIONER

## 2022-08-06 PROCEDURE — 99214 OFFICE O/P EST MOD 30 MIN: CPT | Performed by: NURSE PRACTITIONER

## 2022-08-06 RX ORDER — AMOXICILLIN AND CLAVULANATE POTASSIUM 875; 125 MG/1; MG/1
1 TABLET, FILM COATED ORAL 2 TIMES DAILY
Qty: 14 TABLET | Refills: 0 | Status: SHIPPED | OUTPATIENT
Start: 2022-08-06 | End: 2022-08-13

## 2022-08-06 ASSESSMENT — ENCOUNTER SYMPTOMS
CONSTIPATION: 0
SPUTUM PRODUCTION: 1
SORE THROAT: 0
WHEEZING: 0
COUGH: 1
ORTHOPNEA: 0
MYALGIAS: 0
EYE DISCHARGE: 0
HEADACHES: 0
NAUSEA: 0
DIARRHEA: 0
SHORTNESS OF BREATH: 0
CHILLS: 0
FEVER: 0

## 2022-08-06 NOTE — PROGRESS NOTES
Subjective     Olinda Trevino is a 37 y.o. female who presents with Sinus Problem and Cough            HPI New. 37 year old female with sinus congestion and cough as well as bilateral ear pain x 4 days. She denies fever, chills, myalgia or diarrhea. She has had nausea and vomiting with this. She has no shortness of breath or wheezing. Taking over the counter medications with no relief. Son with similar symptoms.  Cefdinir  No current outpatient medications on file prior to visit.     No current facility-administered medications on file prior to visit.     Social History     Socioeconomic History   • Marital status:      Spouse name: Not on file   • Number of children: Not on file   • Years of education: Not on file   • Highest education level: Not on file   Occupational History   • Not on file   Tobacco Use   • Smoking status: Never Smoker   • Smokeless tobacco: Never Used   Vaping Use   • Vaping Use: Never used   Substance and Sexual Activity   • Alcohol use: No   • Drug use: No   • Sexual activity: Yes     Partners: Male     Birth control/protection: None   Other Topics Concern   • Not on file   Social History Narrative   • Not on file     Social Determinants of Health     Financial Resource Strain: Not on file   Food Insecurity: Not on file   Transportation Needs: Not on file   Physical Activity: Not on file   Stress: Not on file   Social Connections: Not on file   Intimate Partner Violence: Not on file   Housing Stability: Not on file     Breast Cancer-related family history is not on file.          Review of Systems   Constitutional: Positive for malaise/fatigue. Negative for chills and fever.   HENT: Positive for congestion. Negative for sore throat.    Eyes: Negative for discharge.   Respiratory: Positive for cough and sputum production. Negative for shortness of breath and wheezing.    Cardiovascular: Negative for chest pain and orthopnea.   Gastrointestinal: Negative for constipation,  "diarrhea and nausea.   Musculoskeletal: Negative for myalgias.   Neurological: Negative for headaches.   Endo/Heme/Allergies: Negative for environmental allergies.              Objective     BP (!) 128/32   Pulse (!) 112   Temp 36.3 °C (97.3 °F) (Temporal)   Resp 16   Ht 1.6 m (5' 3\")   Wt 84.8 kg (187 lb)   SpO2 98%   BMI 33.13 kg/m²      Physical Exam  Constitutional:       General: She is not in acute distress.     Appearance: She is well-developed.   HENT:      Head: Normocephalic.      Right Ear: External ear normal. A middle ear effusion is present. Tympanic membrane is not bulging.      Left Ear: External ear normal. A middle ear effusion is present. Tympanic membrane is bulging.      Nose: Mucosal edema, congestion and rhinorrhea present.      Mouth/Throat:      Pharynx: No posterior oropharyngeal erythema.   Eyes:      General:         Right eye: No discharge.         Left eye: No discharge.      Conjunctiva/sclera: Conjunctivae normal.   Cardiovascular:      Rate and Rhythm: Normal rate and regular rhythm.      Heart sounds: Normal heart sounds.   Pulmonary:      Effort: Pulmonary effort is normal.      Breath sounds: Normal breath sounds.   Musculoskeletal:         General: Normal range of motion.      Cervical back: Normal range of motion and neck supple.   Lymphadenopathy:      Cervical: No cervical adenopathy.   Skin:     General: Skin is warm and dry.   Neurological:      Mental Status: She is alert and oriented to person, place, and time.   Psychiatric:         Behavior: Behavior normal.         Thought Content: Thought content normal.                             Assessment & Plan        1. Viral URI with cough     2. Acute otitis media with effusion of left ear  amoxicillin-clavulanate (AUGMENTIN) 875-125 MG Tab   3. Cough  POCT SARS-COV Antigen MATT (Symptomatic only)     Negative covid. Reviewed with patient.   augmentin for her left ear.  Supportive care discussed for respiratory symptoms. "   Differential diagnosis, natural history, supportive care, and indications for immediate follow-up discussed at length.

## 2022-10-24 SDOH — ECONOMIC STABILITY: HOUSING INSECURITY
IN THE LAST 12 MONTHS, WAS THERE A TIME WHEN YOU DID NOT HAVE A STEADY PLACE TO SLEEP OR SLEPT IN A SHELTER (INCLUDING NOW)?: NO

## 2022-10-24 SDOH — HEALTH STABILITY: PHYSICAL HEALTH: ON AVERAGE, HOW MANY MINUTES DO YOU ENGAGE IN EXERCISE AT THIS LEVEL?: 30 MIN

## 2022-10-24 SDOH — ECONOMIC STABILITY: HOUSING INSECURITY: IN THE LAST 12 MONTHS, HOW MANY PLACES HAVE YOU LIVED?: 1

## 2022-10-24 SDOH — ECONOMIC STABILITY: TRANSPORTATION INSECURITY
IN THE PAST 12 MONTHS, HAS THE LACK OF TRANSPORTATION KEPT YOU FROM MEDICAL APPOINTMENTS OR FROM GETTING MEDICATIONS?: NO

## 2022-10-24 SDOH — ECONOMIC STABILITY: TRANSPORTATION INSECURITY
IN THE PAST 12 MONTHS, HAS LACK OF RELIABLE TRANSPORTATION KEPT YOU FROM MEDICAL APPOINTMENTS, MEETINGS, WORK OR FROM GETTING THINGS NEEDED FOR DAILY LIVING?: NO

## 2022-10-24 SDOH — ECONOMIC STABILITY: FOOD INSECURITY: WITHIN THE PAST 12 MONTHS, YOU WORRIED THAT YOUR FOOD WOULD RUN OUT BEFORE YOU GOT MONEY TO BUY MORE.: NEVER TRUE

## 2022-10-24 SDOH — HEALTH STABILITY: MENTAL HEALTH
STRESS IS WHEN SOMEONE FEELS TENSE, NERVOUS, ANXIOUS, OR CAN'T SLEEP AT NIGHT BECAUSE THEIR MIND IS TROUBLED. HOW STRESSED ARE YOU?: ONLY A LITTLE

## 2022-10-24 SDOH — ECONOMIC STABILITY: FOOD INSECURITY: WITHIN THE PAST 12 MONTHS, THE FOOD YOU BOUGHT JUST DIDN'T LAST AND YOU DIDN'T HAVE MONEY TO GET MORE.: NEVER TRUE

## 2022-10-24 SDOH — ECONOMIC STABILITY: TRANSPORTATION INSECURITY
IN THE PAST 12 MONTHS, HAS LACK OF TRANSPORTATION KEPT YOU FROM MEETINGS, WORK, OR FROM GETTING THINGS NEEDED FOR DAILY LIVING?: NO

## 2022-10-24 SDOH — HEALTH STABILITY: PHYSICAL HEALTH: ON AVERAGE, HOW MANY DAYS PER WEEK DO YOU ENGAGE IN MODERATE TO STRENUOUS EXERCISE (LIKE A BRISK WALK)?: 3 DAYS

## 2022-10-24 SDOH — ECONOMIC STABILITY: INCOME INSECURITY: IN THE LAST 12 MONTHS, WAS THERE A TIME WHEN YOU WERE NOT ABLE TO PAY THE MORTGAGE OR RENT ON TIME?: NO

## 2022-10-24 SDOH — ECONOMIC STABILITY: INCOME INSECURITY: HOW HARD IS IT FOR YOU TO PAY FOR THE VERY BASICS LIKE FOOD, HOUSING, MEDICAL CARE, AND HEATING?: NOT HARD AT ALL

## 2022-10-24 ASSESSMENT — SOCIAL DETERMINANTS OF HEALTH (SDOH)
HOW OFTEN DO YOU GET TOGETHER WITH FRIENDS OR RELATIVES?: MORE THAN THREE TIMES A WEEK
HOW OFTEN DO YOU HAVE A DRINK CONTAINING ALCOHOL: NEVER
HOW OFTEN DO YOU ATTENT MEETINGS OF THE CLUB OR ORGANIZATION YOU BELONG TO?: NEVER
HOW OFTEN DO YOU ATTEND CHURCH OR RELIGIOUS SERVICES?: NEVER
DO YOU BELONG TO ANY CLUBS OR ORGANIZATIONS SUCH AS CHURCH GROUPS UNIONS, FRATERNAL OR ATHLETIC GROUPS, OR SCHOOL GROUPS?: NO
IN A TYPICAL WEEK, HOW MANY TIMES DO YOU TALK ON THE PHONE WITH FAMILY, FRIENDS, OR NEIGHBORS?: MORE THAN THREE TIMES A WEEK
HOW MANY DRINKS CONTAINING ALCOHOL DO YOU HAVE ON A TYPICAL DAY WHEN YOU ARE DRINKING: PATIENT DOES NOT DRINK
HOW HARD IS IT FOR YOU TO PAY FOR THE VERY BASICS LIKE FOOD, HOUSING, MEDICAL CARE, AND HEATING?: NOT HARD AT ALL
IN A TYPICAL WEEK, HOW MANY TIMES DO YOU TALK ON THE PHONE WITH FAMILY, FRIENDS, OR NEIGHBORS?: MORE THAN THREE TIMES A WEEK
HOW OFTEN DO YOU ATTEND CHURCH OR RELIGIOUS SERVICES?: NEVER
HOW OFTEN DO YOU HAVE SIX OR MORE DRINKS ON ONE OCCASION: NEVER
HOW OFTEN DO YOU ATTENT MEETINGS OF THE CLUB OR ORGANIZATION YOU BELONG TO?: NEVER
HOW OFTEN DO YOU GET TOGETHER WITH FRIENDS OR RELATIVES?: MORE THAN THREE TIMES A WEEK
WITHIN THE PAST 12 MONTHS, YOU WORRIED THAT YOUR FOOD WOULD RUN OUT BEFORE YOU GOT THE MONEY TO BUY MORE: NEVER TRUE
DO YOU BELONG TO ANY CLUBS OR ORGANIZATIONS SUCH AS CHURCH GROUPS UNIONS, FRATERNAL OR ATHLETIC GROUPS, OR SCHOOL GROUPS?: NO

## 2022-10-24 ASSESSMENT — LIFESTYLE VARIABLES
HOW MANY STANDARD DRINKS CONTAINING ALCOHOL DO YOU HAVE ON A TYPICAL DAY: PATIENT DOES NOT DRINK
HOW OFTEN DO YOU HAVE SIX OR MORE DRINKS ON ONE OCCASION: NEVER
HOW OFTEN DO YOU HAVE A DRINK CONTAINING ALCOHOL: NEVER
SKIP TO QUESTIONS 9-10: 1
AUDIT-C TOTAL SCORE: 0

## 2022-10-25 ENCOUNTER — OFFICE VISIT (OUTPATIENT)
Dept: MEDICAL GROUP | Facility: PHYSICIAN GROUP | Age: 37
End: 2022-10-25
Payer: COMMERCIAL

## 2022-10-25 VITALS
HEIGHT: 63 IN | HEART RATE: 102 BPM | OXYGEN SATURATION: 95 % | SYSTOLIC BLOOD PRESSURE: 130 MMHG | DIASTOLIC BLOOD PRESSURE: 78 MMHG | RESPIRATION RATE: 18 BRPM | BODY MASS INDEX: 34.76 KG/M2 | WEIGHT: 196.2 LBS | TEMPERATURE: 98.8 F

## 2022-10-25 DIAGNOSIS — E78.2 ELEVATED TRIGLYCERIDES WITH HIGH CHOLESTEROL: ICD-10-CM

## 2022-10-25 DIAGNOSIS — N20.0 KIDNEY STONES: ICD-10-CM

## 2022-10-25 DIAGNOSIS — E55.9 VITAMIN D DEFICIENCY: ICD-10-CM

## 2022-10-25 DIAGNOSIS — R63.5 WEIGHT GAIN: ICD-10-CM

## 2022-10-25 DIAGNOSIS — J30.2 SEASONAL ALLERGIES: ICD-10-CM

## 2022-10-25 DIAGNOSIS — K80.20 CALCULUS OF GALLBLADDER WITHOUT CHOLECYSTITIS WITHOUT OBSTRUCTION: ICD-10-CM

## 2022-10-25 PROCEDURE — 99214 OFFICE O/P EST MOD 30 MIN: CPT | Performed by: FAMILY MEDICINE

## 2022-10-25 ASSESSMENT — PATIENT HEALTH QUESTIONNAIRE - PHQ9: CLINICAL INTERPRETATION OF PHQ2 SCORE: 0
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
## 2022-10-25 NOTE — LETTER
[employer]  Mable Alexis M.D.  [address]  [place] [address]  Fax: [phone]   Authorization for Release/Disclosure of   Protected Health Information   Name: DEMETRICE GALAN : 1985 SSN: [id number]   Address: [address] [place] [address] [place] [address] Phone:    There are no phone numbers on file.   I authorize the entity listed below to release/disclose the PHI below to:   [facility] [employer]/Mable Alexis M.D. and Mable Alexis M.D.   Provider or Entity Name:  [facility]   Address   [facility], [place]  [address] [facility] [address], [place] [address]  Phone:  [phone]    Fax:  [phone]   Reason for request: continuity of care   Information to be released:    [  ] LAST COLONOSCOPY,  including any PATH REPORT and follow-up  [  ] LAST FIT/COLOGUARD RESULT [  ] LAST DEXA  [  ] LAST MAMMOGRAM  [  ] LAST PAP  [  ] LAST LABS [  ] RETINA EXAM REPORT  [  ] IMMUNIZATION RECORDS  [ XXX ] Release all info      [  ] Check here and initial the line next to each item to release ALL health information INCLUDING  _____ Care and treatment for drug and / or alcohol abuse  _____ HIV testing, infection status, or AIDS  _____ Genetic Testing    DATES OF SERVICE OR TIME PERIOD TO BE DISCLOSED: _____________  I understand and acknowledge that:  * This Authorization may be revoked at any time by you in writing, except if your health information has already been used or disclosed.  * Your health information that will be used or disclosed as a result of you signing this authorization could be re-disclosed by the recipient. If this occurs, your re-disclosed health information may no longer be protected by State or Federal laws.  * You may refuse to sign this Authorization. Your refusal will not affect your ability to obtain treatment.  * This Authorization becomes effective upon signing and will  on (date) __________.      If no date is indicated, this Authorization will  one (1)  year from the signature date.    Name: Olinda Pollard Belinda    Signature:   Date:     10/25/2022       PLEASE FAX REQUESTED RECORDS BACK TO: [phone]

## 2022-11-17 ENCOUNTER — OFFICE VISIT (OUTPATIENT)
Dept: URGENT CARE | Facility: PHYSICIAN GROUP | Age: 37
End: 2022-11-17
Payer: COMMERCIAL

## 2022-11-17 VITALS
RESPIRATION RATE: 18 BRPM | WEIGHT: 190 LBS | HEIGHT: 62 IN | TEMPERATURE: 99.2 F | DIASTOLIC BLOOD PRESSURE: 66 MMHG | SYSTOLIC BLOOD PRESSURE: 122 MMHG | BODY MASS INDEX: 34.96 KG/M2 | HEART RATE: 108 BPM | OXYGEN SATURATION: 97 %

## 2022-11-17 DIAGNOSIS — R09.81 COMPLAINT OF NASAL CONGESTION: ICD-10-CM

## 2022-11-17 DIAGNOSIS — J01.00 ACUTE NON-RECURRENT MAXILLARY SINUSITIS: ICD-10-CM

## 2022-11-17 DIAGNOSIS — R05.1 ACUTE COUGH: ICD-10-CM

## 2022-11-17 DIAGNOSIS — J34.89 SINUS PRESSURE: ICD-10-CM

## 2022-11-17 PROCEDURE — 99213 OFFICE O/P EST LOW 20 MIN: CPT | Performed by: PHYSICIAN ASSISTANT

## 2022-11-17 RX ORDER — DEXTROMETHORPHAN HYDROBROMIDE AND PROMETHAZINE HYDROCHLORIDE 15; 6.25 MG/5ML; MG/5ML
5 SYRUP ORAL
Qty: 70 ML | Refills: 0 | Status: SHIPPED | OUTPATIENT
Start: 2022-11-17 | End: 2022-12-19

## 2022-11-17 RX ORDER — BENZONATATE 100 MG/1
100 CAPSULE ORAL 3 TIMES DAILY PRN
Qty: 20 CAPSULE | Refills: 0 | Status: SHIPPED | OUTPATIENT
Start: 2022-11-17 | End: 2022-12-19

## 2022-11-17 RX ORDER — AMOXICILLIN AND CLAVULANATE POTASSIUM 875; 125 MG/1; MG/1
1 TABLET, FILM COATED ORAL 2 TIMES DAILY
Qty: 10 TABLET | Refills: 0 | Status: SHIPPED | OUTPATIENT
Start: 2022-11-17 | End: 2022-11-22

## 2022-11-17 ASSESSMENT — ENCOUNTER SYMPTOMS
HEADACHES: 1
COUGH: 1

## 2022-11-17 NOTE — PROGRESS NOTES
"Subjective:   Olinda Trevino is a 37 y.o. female who presents for Cough (X5 days, deep cough, chest hurts from coughing ), Headache, and Nasal Congestion         5 day h/o URI illness.  Son has been sick with possible RSV.  Symptoms progressively worsening.  Severe sinus pain, drainage, ear congestion.   Significant nasal discharge. Temp to 100.  Tried dayquil, robitussin, ibuprofen, flonase, claritin.    Cough  Associated symptoms include headaches.   Headache      Review of Systems   Respiratory:  Positive for cough.    Neurological:  Positive for headaches.     Medications:  This patient does not have an active medication from one of the medication groupers.    Allergies:             Cefdinir    Surgical History:       No past surgical history on file.    Past Social Hx:  Olinda Trevino  reports that she has never smoked. She has never used smokeless tobacco. She reports that she does not drink alcohol and does not use drugs.     Past Family Hx:   Olinda Trevino family history includes Brain Cancer in her maternal uncle; Cancer in her maternal uncle; Diabetes in her maternal uncle; Heart Disease in her maternal grandmother, maternal uncle, and mother; Hypertension in her mother.       Problem list, medications, and allergies reviewed by myself today in Epic.     Objective:     /66   Pulse (!) 108   Temp 37.3 °C (99.2 °F) (Temporal)   Resp 18   Ht 1.575 m (5' 2\")   Wt 86.2 kg (190 lb)   SpO2 97%   BMI 34.75 kg/m²     Physical Exam  Vitals reviewed.   Constitutional:       General: She is not in acute distress.     Appearance: She is well-developed. She is not ill-appearing, toxic-appearing or diaphoretic.   HENT:      Head: Normocephalic.      Right Ear: Ear canal and external ear normal. Tympanic membrane is injected.      Left Ear: Ear canal and external ear normal. Tympanic membrane is injected.      Nose: Mucosal edema and rhinorrhea present.      Right Sinus: " Maxillary sinus tenderness and frontal sinus tenderness present.      Left Sinus: Maxillary sinus tenderness and frontal sinus tenderness present.      Mouth/Throat:      Mouth: Mucous membranes are dry.      Pharynx: Uvula midline. Posterior oropharyngeal erythema present. No uvula swelling.   Eyes:      Conjunctiva/sclera: Conjunctivae normal.      Pupils: Pupils are equal, round, and reactive to light.   Cardiovascular:      Rate and Rhythm: Normal rate and regular rhythm.   Pulmonary:      Effort: Pulmonary effort is normal. No accessory muscle usage or respiratory distress.      Breath sounds: Normal breath sounds. No decreased breath sounds, wheezing, rhonchi or rales.   Musculoskeletal:         General: Normal range of motion.      Cervical back: Normal range of motion and neck supple.   Lymphadenopathy:      Cervical: No cervical adenopathy.   Skin:     General: Skin is warm and dry.   Neurological:      Mental Status: She is alert and oriented to person, place, and time.   Psychiatric:         Behavior: Behavior is cooperative.       Assessment/Plan:     Diagnosis and Associated Orders:     1. Acute non-recurrent maxillary sinusitis  - amoxicillin-clavulanate (AUGMENTIN) 875-125 MG Tab; Take 1 Tablet by mouth 2 times a day for 5 days.  Dispense: 10 Tablet; Refill: 0    2. Acute cough  - benzonatate (TESSALON) 100 MG Cap; Take 1 Capsule by mouth 3 times a day as needed for Cough.  Dispense: 20 Capsule; Refill: 0  - promethazine-dextromethorphan (PROMETHAZINE-DM) 6.25-15 MG/5ML syrup; Take 5 mL by mouth at bedtime as needed for Cough.  Dispense: 70 mL; Refill: 0    3. Complaint of nasal congestion    4. Sinus pressure      Comments/MDM:  5-day history of progressive sinus pressure, drainage, nasal discharge.  Not improved with conservative measures including Flonase, antihistamine, nasal saline spray, cough suppressants.  Recommend antibiotic therapy.    -Increase water intake  -May use over the counter  Ibuprofen/Tylenol as needed for any fever, body aches or throat pain  -May take long acting antihistamine for seasonal allergy symptoms and post-nasal drip as needed  -Over the counter cough suppressant as directed.  -May use over the counter saline nasal spray for nasal lavage for nasal congestion as needed  -May use over the counter Nasacort/Flonase for nasal congestion as needed   -May use throat lozenges for throat discomfort as needed   -May gargle with salt water up to 4x/day as needed for throat discomfort (1 tsp salt dissolved in 1 cup warm water)  -Monitor for increased sinus pain/pressure with sinus congestion with thick mucus production, sinus headache, cough, shortness of breath, fever- need re-evaluation    I personally reviewed prior external notes and test results pertinent to today's visit.  Red flags discussed as well as indications to present to the Emergency Department.  Supportive care, natural history, differential diagnoses, and indications for immediate follow-up discussed.  Patient expresses understanding and agrees to plan.  Patient denies any other questions or concerns.    Follow-up with the primary care physician for recheck, reevaluation, and consideration of further management.      Please note that this dictation was created using voice recognition software. I have made a reasonable attempt to correct obvious errors, but I expect that there are errors of grammar and possibly content that I did not discover before finalizing the note.    This note was electronically signed by Nolvia Levy PA-C

## 2022-11-28 ENCOUNTER — HOSPITAL ENCOUNTER (OUTPATIENT)
Dept: LAB | Facility: MEDICAL CENTER | Age: 37
End: 2022-11-28
Attending: FAMILY MEDICINE
Payer: COMMERCIAL

## 2022-11-28 DIAGNOSIS — E55.9 VITAMIN D DEFICIENCY: ICD-10-CM

## 2022-11-28 DIAGNOSIS — J30.2 SEASONAL ALLERGIES: ICD-10-CM

## 2022-11-28 DIAGNOSIS — R63.5 WEIGHT GAIN: ICD-10-CM

## 2022-11-28 DIAGNOSIS — E78.2 ELEVATED TRIGLYCERIDES WITH HIGH CHOLESTEROL: ICD-10-CM

## 2022-11-28 DIAGNOSIS — K80.20 CALCULUS OF GALLBLADDER WITHOUT CHOLECYSTITIS WITHOUT OBSTRUCTION: ICD-10-CM

## 2022-11-28 LAB
25(OH)D3 SERPL-MCNC: 35 NG/ML (ref 30–100)
ALBUMIN SERPL BCP-MCNC: 5 G/DL (ref 3.2–4.9)
ALBUMIN/GLOB SERPL: 1.7 G/DL
ALP SERPL-CCNC: 81 U/L (ref 30–99)
ALT SERPL-CCNC: 19 U/L (ref 2–50)
ANION GAP SERPL CALC-SCNC: 13 MMOL/L (ref 7–16)
AST SERPL-CCNC: 16 U/L (ref 12–45)
BASOPHILS # BLD AUTO: 0.7 % (ref 0–1.8)
BASOPHILS # BLD: 0.06 K/UL (ref 0–0.12)
BILIRUB SERPL-MCNC: 0.4 MG/DL (ref 0.1–1.5)
BUN SERPL-MCNC: 10 MG/DL (ref 8–22)
CALCIUM SERPL-MCNC: 9.7 MG/DL (ref 8.5–10.5)
CHLORIDE SERPL-SCNC: 100 MMOL/L (ref 96–112)
CHOLEST SERPL-MCNC: 275 MG/DL (ref 100–199)
CO2 SERPL-SCNC: 22 MMOL/L (ref 20–33)
CREAT SERPL-MCNC: 0.55 MG/DL (ref 0.5–1.4)
EOSINOPHIL # BLD AUTO: 0.13 K/UL (ref 0–0.51)
EOSINOPHIL NFR BLD: 1.6 % (ref 0–6.9)
ERYTHROCYTE [DISTWIDTH] IN BLOOD BY AUTOMATED COUNT: 43 FL (ref 35.9–50)
FASTING STATUS PATIENT QL REPORTED: NORMAL
GFR SERPLBLD CREATININE-BSD FMLA CKD-EPI: 122 ML/MIN/1.73 M 2
GLOBULIN SER CALC-MCNC: 2.9 G/DL (ref 1.9–3.5)
GLUCOSE SERPL-MCNC: 96 MG/DL (ref 65–99)
HCT VFR BLD AUTO: 42.9 % (ref 37–47)
HDLC SERPL-MCNC: 45 MG/DL
HGB BLD-MCNC: 13.9 G/DL (ref 12–16)
IMM GRANULOCYTES # BLD AUTO: 0.03 K/UL (ref 0–0.11)
IMM GRANULOCYTES NFR BLD AUTO: 0.4 % (ref 0–0.9)
LDLC SERPL CALC-MCNC: 178 MG/DL
LYMPHOCYTES # BLD AUTO: 2.95 K/UL (ref 1–4.8)
LYMPHOCYTES NFR BLD: 36.6 % (ref 22–41)
MCH RBC QN AUTO: 29.1 PG (ref 27–33)
MCHC RBC AUTO-ENTMCNC: 32.4 G/DL (ref 33.6–35)
MCV RBC AUTO: 89.9 FL (ref 81.4–97.8)
MONOCYTES # BLD AUTO: 0.34 K/UL (ref 0–0.85)
MONOCYTES NFR BLD AUTO: 4.2 % (ref 0–13.4)
NEUTROPHILS # BLD AUTO: 4.55 K/UL (ref 2–7.15)
NEUTROPHILS NFR BLD: 56.5 % (ref 44–72)
NRBC # BLD AUTO: 0 K/UL
NRBC BLD-RTO: 0 /100 WBC
PLATELET # BLD AUTO: 409 K/UL (ref 164–446)
PMV BLD AUTO: 9.2 FL (ref 9–12.9)
POTASSIUM SERPL-SCNC: 4.1 MMOL/L (ref 3.6–5.5)
PROT SERPL-MCNC: 7.9 G/DL (ref 6–8.2)
RBC # BLD AUTO: 4.77 M/UL (ref 4.2–5.4)
SODIUM SERPL-SCNC: 135 MMOL/L (ref 135–145)
T3 SERPL-MCNC: 140 NG/DL (ref 60–181)
TRIGL SERPL-MCNC: 258 MG/DL (ref 0–149)
TSH SERPL DL<=0.005 MIU/L-ACNC: 1.79 UIU/ML (ref 0.38–5.33)
URATE SERPL-MCNC: 9.4 MG/DL (ref 1.9–8.2)
WBC # BLD AUTO: 8.1 K/UL (ref 4.8–10.8)

## 2022-11-28 PROCEDURE — 80053 COMPREHEN METABOLIC PANEL: CPT

## 2022-11-28 PROCEDURE — 85025 COMPLETE CBC W/AUTO DIFF WBC: CPT

## 2022-11-28 PROCEDURE — 84443 ASSAY THYROID STIM HORMONE: CPT

## 2022-11-28 PROCEDURE — 82306 VITAMIN D 25 HYDROXY: CPT

## 2022-11-28 PROCEDURE — 84480 ASSAY TRIIODOTHYRONINE (T3): CPT

## 2022-11-28 PROCEDURE — 36415 COLL VENOUS BLD VENIPUNCTURE: CPT

## 2022-11-28 PROCEDURE — 84550 ASSAY OF BLOOD/URIC ACID: CPT

## 2022-11-28 PROCEDURE — 80061 LIPID PANEL: CPT

## 2022-12-06 ENCOUNTER — APPOINTMENT (OUTPATIENT)
Dept: MEDICAL GROUP | Facility: PHYSICIAN GROUP | Age: 37
End: 2022-12-06
Payer: COMMERCIAL

## 2022-12-19 ENCOUNTER — OFFICE VISIT (OUTPATIENT)
Dept: MEDICAL GROUP | Facility: PHYSICIAN GROUP | Age: 37
End: 2022-12-19
Payer: COMMERCIAL

## 2022-12-19 VITALS
SYSTOLIC BLOOD PRESSURE: 126 MMHG | OXYGEN SATURATION: 97 % | TEMPERATURE: 98.3 F | WEIGHT: 190.2 LBS | HEART RATE: 104 BPM | HEIGHT: 62 IN | BODY MASS INDEX: 35 KG/M2 | DIASTOLIC BLOOD PRESSURE: 72 MMHG | RESPIRATION RATE: 16 BRPM

## 2022-12-19 DIAGNOSIS — E78.2 ELEVATED TRIGLYCERIDES WITH HIGH CHOLESTEROL: ICD-10-CM

## 2022-12-19 DIAGNOSIS — E79.0 ELEVATED URIC ACID IN BLOOD: ICD-10-CM

## 2022-12-19 DIAGNOSIS — E55.9 VITAMIN D DEFICIENCY: ICD-10-CM

## 2022-12-19 PROCEDURE — 99214 OFFICE O/P EST MOD 30 MIN: CPT | Performed by: FAMILY MEDICINE

## 2022-12-19 RX ORDER — ALLOPURINOL 100 MG/1
100 TABLET ORAL DAILY
Qty: 90 TABLET | Refills: 1 | Status: SHIPPED | OUTPATIENT
Start: 2022-12-19 | End: 2023-06-07 | Stop reason: SDUPTHER

## 2022-12-19 ASSESSMENT — FIBROSIS 4 INDEX: FIB4 SCORE: 0.31

## 2022-12-19 NOTE — PROGRESS NOTES
"Subjective:     CC:   Chief Complaint   Patient presents with    Follow-Up       HPI:   Olinda presents today for follow-up of her labs.  Patient states she does have a urology appointment.  Patient is doing good otherwise    Past Medical History:   Diagnosis Date    Labor abnormal 4/13/2017       Social History     Tobacco Use    Smoking status: Never    Smokeless tobacco: Never   Vaping Use    Vaping Use: Never used   Substance Use Topics    Alcohol use: No    Drug use: No       No current Epic-ordered outpatient medications on file.     No current Epic-ordered facility-administered medications on file.       Allergies:  Cefdinir    Health Maintenance: Completed    ROS:  Gen: no fevers/chills, no changes in weight  Eyes: no changes in vision  ENT: no sore throat, no hearing loss, no bloody nose  Pulm: no sob, no cough  CV: no chest pain, no palpitations  GI: no nausea/vomiting, no diarrhea  : no dysuria  Neuro: no headaches, no numbness/tingling  Heme/Lymph: no easy bruising    Objective:     Exam:  /72 (BP Location: Right arm, Patient Position: Sitting, BP Cuff Size: Adult)   Pulse (!) 104   Temp 36.8 °C (98.3 °F) (Temporal)   Resp 16   Ht 1.575 m (5' 2\")   Wt 86.3 kg (190 lb 3.2 oz)   LMP 12/04/2022   SpO2 97%   BMI 34.79 kg/m²  Body mass index is 34.79 kg/m².    Gen: Alert and oriented, No apparent distress.  Skin: Warm and dry.  No obvious lesions.  Eyes: Sclera wnl Pupils normal in size  Lungs: Normal effort, CTA bilaterally, no wheezes, rhonchi, or rales  CV: Regular rate and rhythm. No murmurs, rubs, or gallops.  Musculoskeletal: Normal gait. No extremity cyanosis, clubbing, or edema.  Neuro: Oriented to person, place and time  Psych: Mood is wnl       Assessment & Plan:     37 y.o. female with the following -     1. Elevated uric acid in blood  Would recommend placing her on allopurinol patient agreeable with this plan we will recheck it again in about 3 months this is a chronic " problem    2. Vitamin D deficiency  Patient's vitamin D level is slightly in the normal range would recommend vitamin D at 1000 IUs/day we will recheck this in 3 months this is a chronic problem    3. Elevated triglycerides with high cholesterol   Patient's triglyceride and cholesterol are both elevated patient admits to eating eggs patient will cut this out patient wants to work on diet we will repeat this again in 3 months this is a chronic problem    Return in about 3 months (around 3/19/2023), or if symptoms worsen or fail to improve.    Please note that this dictation was created using voice recognition software. I have made every reasonable attempt to correct obvious errors, but I expect that there are errors of grammar and possibly content that I did not discover before finalizing the note.

## 2022-12-20 ENCOUNTER — HOSPITAL ENCOUNTER (OUTPATIENT)
Dept: RADIOLOGY | Facility: MEDICAL CENTER | Age: 37
End: 2022-12-20
Attending: UROLOGY
Payer: COMMERCIAL

## 2022-12-20 DIAGNOSIS — N20.0 CALCULUS OF KIDNEY: ICD-10-CM

## 2022-12-20 PROCEDURE — 74018 RADEX ABDOMEN 1 VIEW: CPT

## 2023-03-01 ENCOUNTER — OFFICE VISIT (OUTPATIENT)
Dept: URGENT CARE | Facility: PHYSICIAN GROUP | Age: 38
End: 2023-03-01
Payer: COMMERCIAL

## 2023-03-01 VITALS
HEART RATE: 114 BPM | BODY MASS INDEX: 32.78 KG/M2 | RESPIRATION RATE: 16 BRPM | DIASTOLIC BLOOD PRESSURE: 88 MMHG | TEMPERATURE: 98.6 F | SYSTOLIC BLOOD PRESSURE: 130 MMHG | HEIGHT: 63 IN | OXYGEN SATURATION: 97 % | WEIGHT: 185 LBS

## 2023-03-01 DIAGNOSIS — J01.90 ACUTE BACTERIAL SINUSITIS: ICD-10-CM

## 2023-03-01 DIAGNOSIS — B96.89 ACUTE BACTERIAL SINUSITIS: ICD-10-CM

## 2023-03-01 PROCEDURE — 99213 OFFICE O/P EST LOW 20 MIN: CPT | Performed by: PHYSICIAN ASSISTANT

## 2023-03-01 RX ORDER — BENZONATATE 100 MG/1
100 CAPSULE ORAL 3 TIMES DAILY PRN
Qty: 60 CAPSULE | Refills: 0 | Status: SHIPPED | OUTPATIENT
Start: 2023-03-01 | End: 2023-04-27

## 2023-03-01 RX ORDER — METHYLPREDNISOLONE 4 MG/1
4 TABLET ORAL DAILY
Qty: 21 TABLET | Refills: 0 | Status: SHIPPED | OUTPATIENT
Start: 2023-03-01 | End: 2023-04-25

## 2023-03-01 RX ORDER — DOXYCYCLINE HYCLATE 100 MG
100 TABLET ORAL 2 TIMES DAILY
Qty: 14 TABLET | Refills: 0 | Status: SHIPPED | OUTPATIENT
Start: 2023-03-01 | End: 2023-03-08

## 2023-03-01 ASSESSMENT — ENCOUNTER SYMPTOMS
CONSTIPATION: 0
CHILLS: 0
SHORTNESS OF BREATH: 0
NAUSEA: 0
WHEEZING: 0
EYE PAIN: 0
FEVER: 0
HEADACHES: 0
COUGH: 1
EYE REDNESS: 0
ABDOMINAL PAIN: 0
DIZZINESS: 0
VOMITING: 0
EYE DISCHARGE: 0
SINUS PAIN: 1
SORE THROAT: 1
DIAPHORESIS: 0
DIARRHEA: 0

## 2023-03-01 ASSESSMENT — FIBROSIS 4 INDEX: FIB4 SCORE: 0.31

## 2023-03-01 NOTE — PROGRESS NOTES
"  Subjective:     Olinda Trevino  is a 37 y.o. female who presents for Pharyngitis (On and off 3 weeks), Otalgia (Both ears on and off 3 weeks), and Nasal Congestion (Sinus pressure/on and off 3 weeks)       She presents today with pharyngitis, otalgia and nasal congestion/pain that has been fluctuating in severity over the past 3 weeks.  She has been using Flonase, over-the-counter Sudafed and other over-the-counter medications without longstanding relief.  Denies fever/chills/sweats, chest pain, shortness of breath, nausea/vomiting, abdominal pain, diarrhea.  Notes that her 5-year-old son is experiencing upper respiratory symptoms at this time.     Review of Systems   Constitutional:  Negative for chills, diaphoresis, fever and malaise/fatigue.   HENT:  Positive for congestion, ear pain, sinus pain and sore throat. Negative for ear discharge and hearing loss.    Eyes:  Negative for pain, discharge and redness.   Respiratory:  Positive for cough. Negative for shortness of breath and wheezing.    Cardiovascular:  Negative for chest pain.   Gastrointestinal:  Negative for abdominal pain, constipation, diarrhea, nausea and vomiting.   Genitourinary:  Negative for dysuria, frequency and urgency.   Neurological:  Negative for dizziness and headaches.    Allergies   Allergen Reactions    Cefdinir Rash     Hands and feet broke out in rash, took benadryl      Past Medical History:   Diagnosis Date    Labor abnormal 4/13/2017        Objective:   /88 (BP Location: Right arm, Patient Position: Sitting, BP Cuff Size: Adult)   Pulse (!) 114   Temp 37 °C (98.6 °F) (Temporal)   Resp 16   Ht 1.6 m (5' 3\")   Wt 83.9 kg (185 lb)   SpO2 97%   BMI 32.77 kg/m²   Physical Exam  Vitals and nursing note reviewed.   Constitutional:       General: She is not in acute distress.     Appearance: Normal appearance. She is not ill-appearing, toxic-appearing or diaphoretic.   HENT:      Head: Normocephalic.      Comments: " No tenderness to palpation over the maxillary or frontal sinuses     Right Ear: Tympanic membrane, ear canal and external ear normal. There is no impacted cerumen.      Left Ear: Tympanic membrane, ear canal and external ear normal. There is no impacted cerumen.      Nose: Congestion present. No rhinorrhea.      Mouth/Throat:      Mouth: Mucous membranes are moist.      Pharynx: No oropharyngeal exudate or posterior oropharyngeal erythema.   Eyes:      General:         Right eye: No discharge.         Left eye: No discharge.      Conjunctiva/sclera: Conjunctivae normal.   Cardiovascular:      Rate and Rhythm: Normal rate and regular rhythm.   Pulmonary:      Effort: Pulmonary effort is normal. No respiratory distress.      Breath sounds: Normal breath sounds. No stridor. No wheezing or rhonchi.   Musculoskeletal:      Cervical back: Neck supple.   Lymphadenopathy:      Cervical: No cervical adenopathy.   Neurological:      General: No focal deficit present.      Mental Status: She is alert and oriented to person, place, and time.   Psychiatric:         Mood and Affect: Mood normal.         Behavior: Behavior normal.         Thought Content: Thought content normal.         Judgment: Judgment normal.           Diagnostic testing: None    Assessment/Plan:     Encounter Diagnoses   Name Primary?    Acute bacterial sinusitis           Plan for care for today's complaint includes doxycycline, Medrol Dosepak, Tessalon Perles for her acute bacterial sinusitis symptoms.  Evidence to support antibiotic use at this time based on symptom duration and lack of improvement with several nonantibiotic medications.  Continue to monitor symptoms and return to urgent care or follow-up with primary care provider if symptoms remain ongoing.  Follow-up in the emergency department if symptoms become severe, ER precautions discussed in office today..  Prescription for doxycycline, Medrol Dosepak, Tessalon Perles provided.    See AVS  Instructions below for written guidance provided to patient on after-visit management and care in addition to our verbal discussion during the visit.    Please note that this dictation was created using voice recognition software. I have attempted to correct all errors, but there may be sound-alike, spelling, grammar and possibly content errors that I did not discover before finalizing the note.    Desert Palmsrodrigo Ferro PA-C

## 2023-04-14 ENCOUNTER — DOCUMENTATION (OUTPATIENT)
Dept: HEALTH INFORMATION MANAGEMENT | Facility: OTHER | Age: 38
End: 2023-04-14
Payer: COMMERCIAL

## 2023-04-25 ENCOUNTER — OFFICE VISIT (OUTPATIENT)
Dept: URGENT CARE | Facility: PHYSICIAN GROUP | Age: 38
End: 2023-04-25
Payer: COMMERCIAL

## 2023-04-25 VITALS
SYSTOLIC BLOOD PRESSURE: 132 MMHG | DIASTOLIC BLOOD PRESSURE: 80 MMHG | WEIGHT: 196 LBS | HEART RATE: 110 BPM | HEIGHT: 63 IN | BODY MASS INDEX: 34.73 KG/M2 | TEMPERATURE: 97.2 F | RESPIRATION RATE: 18 BRPM | OXYGEN SATURATION: 97 %

## 2023-04-25 DIAGNOSIS — B97.89 VIRAL SINUSITIS: ICD-10-CM

## 2023-04-25 DIAGNOSIS — J06.9 VIRAL URI WITH COUGH: ICD-10-CM

## 2023-04-25 DIAGNOSIS — J32.9 VIRAL SINUSITIS: ICD-10-CM

## 2023-04-25 DIAGNOSIS — J04.0 LARYNGITIS: ICD-10-CM

## 2023-04-25 PROCEDURE — 99213 OFFICE O/P EST LOW 20 MIN: CPT | Performed by: PHYSICIAN ASSISTANT

## 2023-04-25 RX ORDER — METHYLPREDNISOLONE 4 MG/1
TABLET ORAL
Qty: 21 TABLET | Refills: 0 | Status: SHIPPED | OUTPATIENT
Start: 2023-04-25 | End: 2023-04-27

## 2023-04-25 RX ORDER — DEXTROMETHORPHAN HYDROBROMIDE AND PROMETHAZINE HYDROCHLORIDE 15; 6.25 MG/5ML; MG/5ML
5 SYRUP ORAL EVERY 4 HOURS PRN
Qty: 120 ML | Refills: 0 | Status: SHIPPED | OUTPATIENT
Start: 2023-04-25 | End: 2023-04-27

## 2023-04-25 ASSESSMENT — FIBROSIS 4 INDEX: FIB4 SCORE: 0.31

## 2023-04-25 NOTE — PROGRESS NOTES
"Subjective:   Olinda Trevino is a 37 y.o. female who presents for Sinusitis (X 1 week)      HPI  The patient presents to the Urgent Care with complaints of cough and sinus congestion for 1 week.  Head pressure and sinus congestion, deep cough.  The cough is nonproductive.  She is losing her voice.  Occasional shortness of breath.  She has tried Flonase, Sudafed, Mucinex and other over-the-counter remedies without much relief.  Denies any fever, chills, body aches, chest pain, vomiting, diarrhea. Works with children.           Medications:    allopurinol Tabs  benzonatate Caps  methylPREDNISolone Tbpk    Allergies: Cefdinir    Problem List: Olinda Trevino does not have any pertinent problems on file.    Surgical History:  No past surgical history on file.    Past Social Hx: Olinda Trevino  reports that she has never smoked. She has never used smokeless tobacco. She reports that she does not drink alcohol and does not use drugs.     Past Family Hx:  Olinda Trevino family history includes Brain Cancer in her maternal uncle; Cancer in her maternal uncle; Diabetes in her maternal uncle; Heart Disease in her maternal grandmother, maternal uncle, and mother; Hypertension in her mother.     Problem list, medications, and allergies reviewed by myself today in Epic.     Objective:     /80   Pulse (!) 110   Temp 36.2 °C (97.2 °F)   Resp 18   Ht 1.6 m (5' 3\")   Wt 88.9 kg (196 lb)   SpO2 97%   BMI 34.72 kg/m²     Physical Exam  Vitals reviewed.   Constitutional:       General: She is not in acute distress.     Appearance: Normal appearance. She is not ill-appearing or toxic-appearing.   HENT:      Head: Normocephalic.      Right Ear: Tympanic membrane normal.      Left Ear: Tympanic membrane normal.      Nose: Congestion present.      Mouth/Throat:      Mouth: Mucous membranes are moist.      Pharynx: Oropharynx is clear. No oropharyngeal exudate or posterior " oropharyngeal erythema.   Eyes:      Conjunctiva/sclera: Conjunctivae normal.      Pupils: Pupils are equal, round, and reactive to light.   Cardiovascular:      Rate and Rhythm: Normal rate and regular rhythm.      Heart sounds: Normal heart sounds.   Pulmonary:      Effort: Pulmonary effort is normal. No respiratory distress.      Breath sounds: Normal breath sounds. No wheezing, rhonchi or rales.   Musculoskeletal:      Cervical back: Neck supple. No rigidity.   Lymphadenopathy:      Cervical: No cervical adenopathy.   Skin:     General: Skin is warm and dry.   Neurological:      General: No focal deficit present.      Mental Status: She is alert and oriented to person, place, and time.   Psychiatric:         Mood and Affect: Mood normal.         Behavior: Behavior normal.       Diagnosis and associated orders:     1. Viral URI with cough  - promethazine-dextromethorphan (PROMETHAZINE-DM) 6.25-15 MG/5ML syrup; Take 5 mL by mouth every four hours as needed for Cough.  Dispense: 120 mL; Refill: 0    2. Laryngitis    3. Viral sinusitis  - methylPREDNISolone (MEDROL DOSEPAK) 4 MG Tablet Therapy Pack; Follow schedule on package instructions.  Dispense: 21 Tablet; Refill: 0       Comments/MDM:     The patient's presenting symptoms and exam findings most likely are due to a viral etiology. Antibiotics are not indicated at this time.   Prescriptions as above.   Symptomatic and supportive care:  Rest voice.    Plenty of oral hydration and rest   Over the counter cough suppressant as directed.  Tylenol or ibuprofen for pain and fever as directed.   Warm salt water gargles for sore throat, soft foods, cool liquids.   Saline nasal spray and Flonase. OTC decongestants.   Overall, the patient is well-appearing. They are not hypoxic, afebrile, and a normal pulmonary exam.     I personally reviewed prior external notes and test results pertinent to today's visit. Pathogenesis of diagnosis discussed including typical length and  natural progression. Supportive care, natural history, differential diagnoses, and indications for immediate follow-up discussed. Patient expresses understanding and agrees to plan. Patient denies any other questions or concerns.     Follow-up with the primary care physician for recheck, reevaluation, and consideration of further management.    Please note that this dictation was created using voice recognition software. I have made a reasonable attempt to correct obvious errors, but I expect that there are errors of grammar and possibly content that I did not discover before finalizing the note.    This note was electronically signed by Leon Elder PA-C

## 2023-04-27 ENCOUNTER — OFFICE VISIT (OUTPATIENT)
Dept: URGENT CARE | Facility: PHYSICIAN GROUP | Age: 38
End: 2023-04-27
Payer: COMMERCIAL

## 2023-04-27 VITALS
DIASTOLIC BLOOD PRESSURE: 84 MMHG | HEIGHT: 62 IN | RESPIRATION RATE: 19 BRPM | TEMPERATURE: 98.2 F | BODY MASS INDEX: 36.07 KG/M2 | WEIGHT: 196 LBS | SYSTOLIC BLOOD PRESSURE: 140 MMHG | OXYGEN SATURATION: 98 % | HEART RATE: 111 BPM

## 2023-04-27 DIAGNOSIS — R03.0 ELEVATED BLOOD PRESSURE READING WITHOUT DIAGNOSIS OF HYPERTENSION: ICD-10-CM

## 2023-04-27 DIAGNOSIS — B96.89 ACUTE BACTERIAL SINUSITIS: ICD-10-CM

## 2023-04-27 DIAGNOSIS — J01.90 ACUTE BACTERIAL SINUSITIS: ICD-10-CM

## 2023-04-27 PROCEDURE — 99214 OFFICE O/P EST MOD 30 MIN: CPT | Performed by: NURSE PRACTITIONER

## 2023-04-27 RX ORDER — AMOXICILLIN AND CLAVULANATE POTASSIUM 875; 125 MG/1; MG/1
1 TABLET, FILM COATED ORAL 2 TIMES DAILY
Qty: 14 TABLET | Refills: 0 | Status: SHIPPED | OUTPATIENT
Start: 2023-04-27 | End: 2023-05-04

## 2023-04-27 ASSESSMENT — FIBROSIS 4 INDEX: FIB4 SCORE: 0.31

## 2023-04-27 ASSESSMENT — ENCOUNTER SYMPTOMS
HEADACHES: 1
FEVER: 0
DIARRHEA: 0
SHORTNESS OF BREATH: 0
WHEEZING: 0
SPUTUM PRODUCTION: 0
ORTHOPNEA: 0
EYE DISCHARGE: 0
MYALGIAS: 0
CHILLS: 0
COUGH: 1
SORE THROAT: 0
NAUSEA: 0

## 2023-04-27 NOTE — LETTER
April 27, 2023        Olinda Pollard Belinda  [address] [place] [address]        Olinda was seen in our clinic today and she is excused from work for today and tomorrow. Thank you.   If you have any questions or concerns, please don't hesitate to call.        Sincerely,        RAHEL Em.    Electronically Signed

## 2023-04-27 NOTE — PROGRESS NOTES
Subjective     Olinda Trevino is a 37 y.o. female who presents with Cough (Sick over a week came to  4/25 states she is not feeling better/Has headache, cough, no voice )            HPI Recurrent. 37 year old female with one and one half week history of nasal congestion, cough, headache and laryngitis. She denies fever, chills, myalgia, nausea or diarrhea. She was seen 2 days ago and rx'd medrol which upset her stomach. She has been taking otc medications with no relief.  Cefdinir  Current Outpatient Medications on File Prior to Visit   Medication Sig Dispense Refill    allopurinol (ZYLOPRIM) 100 MG Tab Take 1 Tablet by mouth every day. 90 Tablet 1     No current facility-administered medications on file prior to visit.     Social History     Socioeconomic History    Marital status:      Spouse name: Not on file    Number of children: Not on file    Years of education: Not on file    Highest education level: 12th grade   Occupational History    Not on file   Tobacco Use    Smoking status: Never    Smokeless tobacco: Never   Vaping Use    Vaping Use: Never used   Substance and Sexual Activity    Alcohol use: No    Drug use: No    Sexual activity: Not Currently     Partners: Male     Birth control/protection: None   Other Topics Concern    Not on file   Social History Narrative    Not on file     Social Determinants of Health     Financial Resource Strain: Low Risk     Difficulty of Paying Living Expenses: Not hard at all   Food Insecurity: No Food Insecurity    Worried About Running Out of Food in the Last Year: Never true    Ran Out of Food in the Last Year: Never true   Transportation Needs: No Transportation Needs    Lack of Transportation (Medical): No    Lack of Transportation (Non-Medical): No   Physical Activity: Insufficiently Active    Days of Exercise per Week: 3 days    Minutes of Exercise per Session: 30 min   Stress: No Stress Concern Present    Feeling of Stress : Only a little  "  Social Connections: Moderately Isolated    Frequency of Communication with Friends and Family: More than three times a week    Frequency of Social Gatherings with Friends and Family: More than three times a week    Attends Christian Services: Never    Active Member of Clubs or Organizations: No    Attends Club or Organization Meetings: Never    Marital Status:    Intimate Partner Violence: Not on file   Housing Stability: Low Risk     Unable to Pay for Housing in the Last Year: No    Number of Places Lived in the Last Year: 1    Unstable Housing in the Last Year: No     Breast Cancer-related family history is not on file.      Review of Systems   Constitutional:  Positive for malaise/fatigue. Negative for chills and fever.   HENT:  Positive for congestion. Negative for sore throat.    Eyes:  Negative for discharge.   Respiratory:  Positive for cough. Negative for sputum production, shortness of breath and wheezing.    Cardiovascular:  Negative for chest pain and orthopnea.   Gastrointestinal:  Negative for diarrhea and nausea.   Musculoskeletal:  Negative for myalgias.   Neurological:  Positive for headaches.   Endo/Heme/Allergies:  Negative for environmental allergies.            Objective     BP (!) 140/84   Pulse (!) 111   Temp 36.8 °C (98.2 °F) (Temporal)   Resp 19   Ht 1.575 m (5' 2\")   Wt 88.9 kg (196 lb)   LMP 04/03/2023 (Approximate)   SpO2 98%   BMI 35.85 kg/m²      Physical Exam  Vitals and nursing note reviewed.   Constitutional:       General: She is not in acute distress.     Appearance: She is well-developed.   HENT:      Head: Normocephalic.      Right Ear: Tympanic membrane and external ear normal.      Left Ear: Tympanic membrane and external ear normal.      Nose: Mucosal edema, congestion and rhinorrhea present.      Mouth/Throat:      Pharynx: No posterior oropharyngeal erythema.      Comments: +hoarseness  Eyes:      General:         Right eye: No discharge.         Left eye: No " discharge.      Conjunctiva/sclera: Conjunctivae normal.   Cardiovascular:      Rate and Rhythm: Regular rhythm. Tachycardia present.      Heart sounds: Normal heart sounds.   Pulmonary:      Effort: Pulmonary effort is normal.      Breath sounds: Normal breath sounds.   Musculoskeletal:         General: Normal range of motion.      Cervical back: Normal range of motion and neck supple.   Lymphadenopathy:      Cervical: No cervical adenopathy.   Skin:     General: Skin is warm and dry.   Neurological:      Mental Status: She is alert and oriented to person, place, and time.   Psychiatric:         Behavior: Behavior normal.         Thought Content: Thought content normal.                           Assessment & Plan        1. Acute bacterial sinusitis  amoxicillin-clavulanate (AUGMENTIN) 875-125 MG Tab      2. Elevated blood pressure reading without diagnosis of hypertension          Patient with very mildly elevated blood pressure- to monitor.  Augmentin.   Differential diagnosis, natural history, supportive care, and indications for immediate follow-up were discussed.

## 2023-05-31 ENCOUNTER — HOSPITAL ENCOUNTER (OUTPATIENT)
Dept: LAB | Facility: MEDICAL CENTER | Age: 38
End: 2023-05-31
Attending: FAMILY MEDICINE
Payer: COMMERCIAL

## 2023-05-31 DIAGNOSIS — E55.9 VITAMIN D DEFICIENCY: ICD-10-CM

## 2023-05-31 DIAGNOSIS — E78.2 ELEVATED TRIGLYCERIDES WITH HIGH CHOLESTEROL: ICD-10-CM

## 2023-05-31 DIAGNOSIS — E79.0 ELEVATED URIC ACID IN BLOOD: ICD-10-CM

## 2023-05-31 LAB
ALBUMIN SERPL BCP-MCNC: 4.8 G/DL (ref 3.2–4.9)
ALBUMIN/GLOB SERPL: 1.8 G/DL
ALP SERPL-CCNC: 81 U/L (ref 30–99)
ALT SERPL-CCNC: 30 U/L (ref 2–50)
ANION GAP SERPL CALC-SCNC: 17 MMOL/L (ref 7–16)
AST SERPL-CCNC: 21 U/L (ref 12–45)
BILIRUB SERPL-MCNC: 0.4 MG/DL (ref 0.1–1.5)
BUN SERPL-MCNC: 10 MG/DL (ref 8–22)
CALCIUM ALBUM COR SERPL-MCNC: 8.5 MG/DL (ref 8.5–10.5)
CALCIUM SERPL-MCNC: 9.1 MG/DL (ref 8.5–10.5)
CHLORIDE SERPL-SCNC: 101 MMOL/L (ref 96–112)
CHOLEST SERPL-MCNC: 253 MG/DL (ref 100–199)
CO2 SERPL-SCNC: 18 MMOL/L (ref 20–33)
CREAT SERPL-MCNC: 0.56 MG/DL (ref 0.5–1.4)
FASTING STATUS PATIENT QL REPORTED: NORMAL
GFR SERPLBLD CREATININE-BSD FMLA CKD-EPI: 121 ML/MIN/1.73 M 2
GLOBULIN SER CALC-MCNC: 2.7 G/DL (ref 1.9–3.5)
GLUCOSE SERPL-MCNC: 111 MG/DL (ref 65–99)
HDLC SERPL-MCNC: 43 MG/DL
LDLC SERPL CALC-MCNC: 171 MG/DL
POTASSIUM SERPL-SCNC: 4 MMOL/L (ref 3.6–5.5)
PROT SERPL-MCNC: 7.5 G/DL (ref 6–8.2)
SODIUM SERPL-SCNC: 136 MMOL/L (ref 135–145)
TRIGL SERPL-MCNC: 194 MG/DL (ref 0–149)
URATE SERPL-MCNC: 5.7 MG/DL (ref 1.9–8.2)

## 2023-05-31 PROCEDURE — 80053 COMPREHEN METABOLIC PANEL: CPT

## 2023-05-31 PROCEDURE — 82306 VITAMIN D 25 HYDROXY: CPT

## 2023-05-31 PROCEDURE — 84550 ASSAY OF BLOOD/URIC ACID: CPT

## 2023-05-31 PROCEDURE — 80061 LIPID PANEL: CPT

## 2023-05-31 PROCEDURE — 36415 COLL VENOUS BLD VENIPUNCTURE: CPT

## 2023-06-01 LAB — 25(OH)D3 SERPL-MCNC: 39 NG/ML (ref 30–100)

## 2023-06-07 ENCOUNTER — OFFICE VISIT (OUTPATIENT)
Dept: MEDICAL GROUP | Facility: PHYSICIAN GROUP | Age: 38
End: 2023-06-07
Payer: COMMERCIAL

## 2023-06-07 VITALS
HEART RATE: 96 BPM | DIASTOLIC BLOOD PRESSURE: 78 MMHG | RESPIRATION RATE: 18 BRPM | BODY MASS INDEX: 35.37 KG/M2 | OXYGEN SATURATION: 98 % | TEMPERATURE: 98.3 F | SYSTOLIC BLOOD PRESSURE: 124 MMHG | WEIGHT: 192.2 LBS | HEIGHT: 62 IN

## 2023-06-07 DIAGNOSIS — E78.2 ELEVATED TRIGLYCERIDES WITH HIGH CHOLESTEROL: ICD-10-CM

## 2023-06-07 DIAGNOSIS — N20.0 KIDNEY STONES: ICD-10-CM

## 2023-06-07 DIAGNOSIS — R63.5 WEIGHT GAIN: ICD-10-CM

## 2023-06-07 PROCEDURE — 3074F SYST BP LT 130 MM HG: CPT | Performed by: FAMILY MEDICINE

## 2023-06-07 PROCEDURE — 99214 OFFICE O/P EST MOD 30 MIN: CPT | Performed by: FAMILY MEDICINE

## 2023-06-07 PROCEDURE — 3078F DIAST BP <80 MM HG: CPT | Performed by: FAMILY MEDICINE

## 2023-06-07 RX ORDER — LOVASTATIN 10 MG/1
10 TABLET ORAL NIGHTLY
Qty: 90 TABLET | Refills: 0 | Status: SHIPPED | OUTPATIENT
Start: 2023-06-07 | End: 2023-12-01 | Stop reason: SDUPTHER

## 2023-06-07 RX ORDER — ALLOPURINOL 100 MG/1
100 TABLET ORAL DAILY
Qty: 90 TABLET | Refills: 1 | Status: SHIPPED | OUTPATIENT
Start: 2023-06-07 | End: 2023-12-01 | Stop reason: SDUPTHER

## 2023-06-07 ASSESSMENT — PATIENT HEALTH QUESTIONNAIRE - PHQ9: CLINICAL INTERPRETATION OF PHQ2 SCORE: 0

## 2023-06-07 ASSESSMENT — FIBROSIS 4 INDEX: FIB4 SCORE: 0.34

## 2023-06-07 NOTE — PROGRESS NOTES
"Subjective:     CC:   Chief Complaint   Patient presents with    Follow-Up       HPI:   Olinda presents today for follow-up.  Patient does not recall if she did see urology but feels that the allopurinol I put her on has eliminated her having any stones at all she is very happy.  Patient is also here for follow-up on her cholesterol.    Past Medical History:   Diagnosis Date    Labor abnormal 4/13/2017       Social History     Tobacco Use    Smoking status: Never    Smokeless tobacco: Never   Vaping Use    Vaping Use: Never used   Substance Use Topics    Alcohol use: No    Drug use: No       Current Outpatient Medications Ordered in Epic   Medication Sig Dispense Refill    lovastatin (MEVACOR) 10 MG tablet Take 1 Tablet by mouth every evening. 90 Tablet 0    allopurinol (ZYLOPRIM) 100 MG Tab Take 1 Tablet by mouth every day. 90 Tablet 1     No current Epic-ordered facility-administered medications on file.       Allergies:  Cefdinir    Health Maintenance: Completed    ROS:  Gen: no fevers/chills, no changes in weight  Eyes: no changes in vision  ENT: no sore throat, no hearing loss, no bloody nose  Pulm: no sob, no cough  CV: no chest pain, no palpitations  GI: no nausea/vomiting, no diarrhea  : no dysuria  Neuro: no headaches, no numbness/tingling  Heme/Lymph: no easy bruising    Objective:     Exam:  /78 (BP Location: Right arm, Patient Position: Sitting, BP Cuff Size: Large adult)   Pulse 96   Temp 36.8 °C (98.3 °F) (Temporal)   Resp 18   Ht 1.575 m (5' 2\")   Wt 87.2 kg (192 lb 3.2 oz)   LMP 05/30/2023   SpO2 98%   BMI 35.15 kg/m²  Body mass index is 35.15 kg/m².    Gen: Alert and oriented, No apparent distress.  Skin: Warm and dry.  No obvious lesions.  Eyes: Sclera wnl Pupils normal in size  Lungs: Normal effort, CTA bilaterally, no wheezes, rhonchi, or rales  CV: Regular rate and rhythm. No murmurs, rubs, or gallops.  Musculoskeletal: Normal gait. No extremity cyanosis, clubbing, or " edema.  Neuro: Oriented to person, place and time  Psych: Mood is wnl       Assessment & Plan:     38 y.o. female with the following -     1. Elevated triglycerides with high cholesterol  I discussed with patient a low-fat diet patient is agreeable to try this along with increasing her activity level.  Patient would like to be started on a medication for this recommend I see her back in 2 months for follow-up.  - Comp Metabolic Panel; Future  - Lipid Profile; Future    2. Kidney stones  Her uric acid is below 6 we will continue present medication    3. Weight gain  Patient to increase her physical activity that would help her HDL that would also help her LDL lower.  I did inform patient that her fasting blood sugar was 111 which is in the gray zone recommend I see her back in 2 months for follow-up and see how she is doing    Other orders  - lovastatin (MEVACOR) 10 MG tablet; Take 1 Tablet by mouth every evening.  Dispense: 90 Tablet; Refill: 0  - allopurinol (ZYLOPRIM) 100 MG Tab; Take 1 Tablet by mouth every day.  Dispense: 90 Tablet; Refill: 1       Return in about 2 months (around 8/7/2023), or if symptoms worsen or fail to improve.    Please note that this dictation was created using voice recognition software. I have made every reasonable attempt to correct obvious errors, but I expect that there are errors of grammar and possibly content that I did not discover before finalizing the note.

## 2023-06-12 ENCOUNTER — OFFICE VISIT (OUTPATIENT)
Dept: MEDICAL GROUP | Facility: PHYSICIAN GROUP | Age: 38
End: 2023-06-12
Payer: COMMERCIAL

## 2023-06-12 VITALS
DIASTOLIC BLOOD PRESSURE: 80 MMHG | TEMPERATURE: 98.5 F | SYSTOLIC BLOOD PRESSURE: 128 MMHG | BODY MASS INDEX: 35.37 KG/M2 | RESPIRATION RATE: 18 BRPM | OXYGEN SATURATION: 96 % | WEIGHT: 192.2 LBS | HEIGHT: 62 IN | HEART RATE: 110 BPM

## 2023-06-12 DIAGNOSIS — J01.10 ACUTE NON-RECURRENT FRONTAL SINUSITIS: ICD-10-CM

## 2023-06-12 PROCEDURE — 3074F SYST BP LT 130 MM HG: CPT | Performed by: FAMILY MEDICINE

## 2023-06-12 PROCEDURE — 3079F DIAST BP 80-89 MM HG: CPT | Performed by: FAMILY MEDICINE

## 2023-06-12 PROCEDURE — 99213 OFFICE O/P EST LOW 20 MIN: CPT | Performed by: FAMILY MEDICINE

## 2023-06-12 RX ORDER — AZITHROMYCIN 250 MG/1
TABLET, FILM COATED ORAL
Qty: 6 TABLET | Refills: 0 | Status: SHIPPED | OUTPATIENT
Start: 2023-06-12 | End: 2023-06-16

## 2023-06-12 ASSESSMENT — FIBROSIS 4 INDEX: FIB4 SCORE: 0.34

## 2023-06-12 NOTE — PROGRESS NOTES
"Subjective:     CC:   Chief Complaint   Patient presents with    Follow-Up       HPI:   Olinda presents today with sinus congestion and drainage going down her throat.  Patient states she has noticed this starting last Friday.  Patient is blowing some yellowish material from her nose.  Patient denies any fever or diarrhea with this.    Past Medical History:   Diagnosis Date    Labor abnormal 4/13/2017       Social History     Tobacco Use    Smoking status: Never    Smokeless tobacco: Never   Vaping Use    Vaping Use: Never used   Substance Use Topics    Alcohol use: No    Drug use: No       Current Outpatient Medications Ordered in Epic   Medication Sig Dispense Refill    azithromycin (ZITHROMAX) 250 MG Tab 2 tablets today 1 each day for the next 4 more days 6 Tablet 0    lovastatin (MEVACOR) 10 MG tablet Take 1 Tablet by mouth every evening. 90 Tablet 0    allopurinol (ZYLOPRIM) 100 MG Tab Take 1 Tablet by mouth every day. 90 Tablet 1     No current Epic-ordered facility-administered medications on file.       Allergies:  Cefdinir    Health Maintenance: Completed    ROS:  Gen: no fevers/chills, no changes in weight  Eyes: no changes in vision  ENT: no sore throat, no hearing loss, no bloody nose  Pulm: no sob, no cough  CV: no chest pain, no palpitations  GI: no nausea/vomiting, no diarrhea  Neuro: no headaches, no numbness/tingling  Heme/Lymph: no easy bruising    Objective:     Exam:  /80 (BP Location: Right arm, Patient Position: Sitting, BP Cuff Size: Large adult)   Pulse (!) 110   Temp 36.9 °C (98.5 °F) (Temporal)   Resp 18   Ht 1.575 m (5' 2\")   Wt 87.2 kg (192 lb 3.2 oz)   LMP 05/30/2023   SpO2 96%   BMI 35.15 kg/m²  Body mass index is 35.15 kg/m².    Gen: Alert and oriented, No apparent distress.  Skin: Warm and dry.  No obvious lesions.  Eyes: Sclera wnl Pupils normal in size  ENT: Canals wnl and TM are not red, there is clear fluid behind both TMs mouth negative redness or exudates, some " slight frontal sinus tenderness  Lungs: Normal effort, CTA bilaterally, no wheezes, rhonchi, or rales  CV: Regular rate and rhythm. No murmurs, rubs, or gallops.  Musculoskeletal: Normal gait. No extremity cyanosis, clubbing, or edema.  Neuro: Oriented to person, place and time  Psych: Mood is wnl       Assessment & Plan:     38 y.o. female with the following -     1. Acute non-recurrent frontal sinusitis  I will start patient on Zithromax also stressed to the patient start using Flonase nasal spray daily she is only been using it as needed.  Due to the weather fluctuation would recommend she continue using it for a good full month.  If she continues having problems I can always refer her to ear nose and throat    Other orders  - azithromycin (ZITHROMAX) 250 MG Tab; 2 tablets today 1 each day for the next 4 more days  Dispense: 6 Tablet; Refill: 0       Return if symptoms worsen or fail to improve.    Please note that this dictation was created using voice recognition software. I have made every reasonable attempt to correct obvious errors, but I expect that there are errors of grammar and possibly content that I did not discover before finalizing the note.

## 2023-06-12 NOTE — LETTER
June 12, 2023         Patient: Olinda Trevino   YOB: 1985   Date of Visit: 6/12/2023           To Whom it May Concern:    Olinda Trevino was seen in my clinic on 6/12/2023 for a illness.  Please excuse from work today    If you have any questions or concerns, please don't hesitate to call.        Sincerely,           Mable Alexis M.D.  Electronically Signed      no chest pain and no edema.

## 2023-08-03 ENCOUNTER — OFFICE VISIT (OUTPATIENT)
Dept: URGENT CARE | Facility: PHYSICIAN GROUP | Age: 38
End: 2023-08-03
Payer: COMMERCIAL

## 2023-08-03 VITALS
OXYGEN SATURATION: 98 % | HEIGHT: 63 IN | HEART RATE: 84 BPM | RESPIRATION RATE: 16 BRPM | SYSTOLIC BLOOD PRESSURE: 104 MMHG | BODY MASS INDEX: 32.78 KG/M2 | WEIGHT: 185 LBS | TEMPERATURE: 97.6 F | DIASTOLIC BLOOD PRESSURE: 70 MMHG

## 2023-08-03 DIAGNOSIS — J01.01 ACUTE RECURRENT MAXILLARY SINUSITIS: ICD-10-CM

## 2023-08-03 PROCEDURE — 99213 OFFICE O/P EST LOW 20 MIN: CPT

## 2023-08-03 PROCEDURE — 3078F DIAST BP <80 MM HG: CPT

## 2023-08-03 PROCEDURE — 3074F SYST BP LT 130 MM HG: CPT

## 2023-08-03 RX ORDER — AMOXICILLIN AND CLAVULANATE POTASSIUM 875; 125 MG/1; MG/1
1 TABLET, FILM COATED ORAL 2 TIMES DAILY
Qty: 14 TABLET | Refills: 0 | Status: SHIPPED | OUTPATIENT
Start: 2023-08-03 | End: 2023-08-10

## 2023-08-03 ASSESSMENT — FIBROSIS 4 INDEX: FIB4 SCORE: 0.34

## 2023-08-03 NOTE — LETTER
August 3, 2023    To Whom It May Concern:         This is confirmation that Olinda Trevino attended her scheduled appointment with DARREL Nunez on 8/03/23.         If you have any questions please do not hesitate to call me at the phone number listed below.    Sincerely,          LUCERO Nunez.  [phone]

## 2023-08-03 NOTE — LETTER
August 3, 2023    To Whom It May Concern:         This is confirmation that Olinda Trevino attended her scheduled appointment with DARREL Nunez on 8/03/23.  She may return on 8/4/23         If you have any questions please do not hesitate to call me at the phone number listed below.    Sincerely,          LUCERO Nunez.  [phone]

## 2023-08-03 NOTE — LETTER
August 3, 2023    To Whom It May Concern:         This is confirmation that Olinda Trevino attended her scheduled appointment with DARREL Nunez on 8/03/23. May return on 8/5         If you have any questions please do not hesitate to call me at the phone number listed below.    Sincerely,          LUCERO Nunez.  [phone]

## 2023-08-04 NOTE — PROGRESS NOTES
"  Chief Complaint   Patient presents with    Sinus Problem     Pressure, drainage, pressure in both ears x1wk         Subjective:   HISTORY OF PRESENT ILLNESS: Olinda Trevino is a 38 y.o. female who presents for sore throat, sinus pressure ear pain.  She reports that every time she gets sick it seems to go straight to her ears and/or sinuses.  This is the third or fourth visit for the same symptoms.  She has been on multiple antibiotics.    . She denies fever, chills, myalgia, nausea or diarrhea      Medications, Allergies, current problem list, Social and Family history reviewed today in Epic.     Objective:     /70   Pulse 84   Temp 36.4 °C (97.6 °F) (Temporal)   Resp 16   Ht 1.6 m (5' 3\")   Wt 83.9 kg (185 lb)   SpO2 98%     Physical Exam  Vitals reviewed.   Constitutional:       Appearance: Normal appearance.   HENT:      Right Ear: A middle ear effusion is present. Tympanic membrane is not injected or retracted.      Left Ear: A middle ear effusion is present. Tympanic membrane is not injected or retracted.      Nose: Congestion present.      Right Sinus: Maxillary sinus tenderness and frontal sinus tenderness present.      Left Sinus: Maxillary sinus tenderness and frontal sinus tenderness present.      Mouth/Throat:      Mouth: Mucous membranes are moist.   Cardiovascular:      Rate and Rhythm: Normal rate.   Pulmonary:      Effort: Pulmonary effort is normal.   Skin:     General: Skin is warm and dry.   Neurological:      Mental Status: She is alert and oriented to person, place, and time.   Psychiatric:         Mood and Affect: Mood normal.          Assessment/Plan:     Diagnosis and associated orders    1. Acute recurrent maxillary sinusitis  amoxicillin-clavulanate (AUGMENTIN) 875-125 MG Tab          I personally reviewed prior external notes and test results pertinent to today's visit.       IMPRESSION: Patient is non-toxic appearing and suitable for outpatient care at this time.  " Symptoms and exam are consistent with cute sinusitis recurrent.   .  We will treat with Augmentin and put in a referral for ENT    Educated on red flag symptoms and Instructed patient to return to Urgent Care or nearest Emergency Department if symptoms fail to improve, for any change in condition, further concerns, or new concerning symptoms. Patient states understanding of the plan of care and discharge instructions.        Please note that this dictation was created using voice recognition software. I have made a reasonable attempt to correct obvious errors, but I expect that there are errors of grammar and possibly content that I did not discover before finalizing the note.    This note was electronically signed by DARREL Nunez

## 2023-09-09 ENCOUNTER — HOSPITAL ENCOUNTER (OUTPATIENT)
Dept: LAB | Facility: MEDICAL CENTER | Age: 38
End: 2023-09-09
Attending: FAMILY MEDICINE
Payer: COMMERCIAL

## 2023-09-09 DIAGNOSIS — E78.2 ELEVATED TRIGLYCERIDES WITH HIGH CHOLESTEROL: ICD-10-CM

## 2023-09-09 LAB
ALBUMIN SERPL BCP-MCNC: 4.9 G/DL (ref 3.2–4.9)
ALBUMIN/GLOB SERPL: 1.6 G/DL
ALP SERPL-CCNC: 87 U/L (ref 30–99)
ALT SERPL-CCNC: 22 U/L (ref 2–50)
ANION GAP SERPL CALC-SCNC: 13 MMOL/L (ref 7–16)
AST SERPL-CCNC: 17 U/L (ref 12–45)
BILIRUB SERPL-MCNC: 0.4 MG/DL (ref 0.1–1.5)
BUN SERPL-MCNC: 11 MG/DL (ref 8–22)
CALCIUM ALBUM COR SERPL-MCNC: 8.6 MG/DL (ref 8.5–10.5)
CALCIUM SERPL-MCNC: 9.3 MG/DL (ref 8.5–10.5)
CHLORIDE SERPL-SCNC: 101 MMOL/L (ref 96–112)
CHOLEST SERPL-MCNC: 237 MG/DL (ref 100–199)
CO2 SERPL-SCNC: 24 MMOL/L (ref 20–33)
CREAT SERPL-MCNC: 0.53 MG/DL (ref 0.5–1.4)
GFR SERPLBLD CREATININE-BSD FMLA CKD-EPI: 123 ML/MIN/1.73 M 2
GLOBULIN SER CALC-MCNC: 3 G/DL (ref 1.9–3.5)
GLUCOSE SERPL-MCNC: 121 MG/DL (ref 65–99)
HDLC SERPL-MCNC: 36 MG/DL
LDLC SERPL CALC-MCNC: 148 MG/DL
POTASSIUM SERPL-SCNC: 4.2 MMOL/L (ref 3.6–5.5)
PROT SERPL-MCNC: 7.9 G/DL (ref 6–8.2)
SODIUM SERPL-SCNC: 138 MMOL/L (ref 135–145)
TRIGL SERPL-MCNC: 266 MG/DL (ref 0–149)

## 2023-09-09 PROCEDURE — 36415 COLL VENOUS BLD VENIPUNCTURE: CPT

## 2023-09-09 PROCEDURE — 80053 COMPREHEN METABOLIC PANEL: CPT

## 2023-09-09 PROCEDURE — 80061 LIPID PANEL: CPT

## 2023-09-11 DIAGNOSIS — R73.9 HYPERGLYCEMIA: ICD-10-CM

## 2023-09-12 DIAGNOSIS — R73.9 HYPERGLYCEMIA: ICD-10-CM

## 2023-09-12 DIAGNOSIS — E79.0 ELEVATED URIC ACID IN BLOOD: ICD-10-CM

## 2023-09-12 DIAGNOSIS — N20.0 KIDNEY STONES: ICD-10-CM

## 2023-10-05 ENCOUNTER — HOSPITAL ENCOUNTER (OUTPATIENT)
Dept: LAB | Facility: MEDICAL CENTER | Age: 38
End: 2023-10-05
Attending: FAMILY MEDICINE
Payer: COMMERCIAL

## 2023-10-05 DIAGNOSIS — E79.0 ELEVATED URIC ACID IN BLOOD: ICD-10-CM

## 2023-10-05 DIAGNOSIS — R73.9 HYPERGLYCEMIA: ICD-10-CM

## 2023-10-05 LAB
ALBUMIN SERPL BCP-MCNC: 5.1 G/DL (ref 3.2–4.9)
ALBUMIN/GLOB SERPL: 1.8 G/DL
ALP SERPL-CCNC: 87 U/L (ref 30–99)
ALT SERPL-CCNC: 23 U/L (ref 2–50)
ANION GAP SERPL CALC-SCNC: 12 MMOL/L (ref 7–16)
AST SERPL-CCNC: 14 U/L (ref 12–45)
BILIRUB SERPL-MCNC: 0.3 MG/DL (ref 0.1–1.5)
BUN SERPL-MCNC: 19 MG/DL (ref 8–22)
CALCIUM ALBUM COR SERPL-MCNC: 8.6 MG/DL (ref 8.5–10.5)
CALCIUM SERPL-MCNC: 9.5 MG/DL (ref 8.5–10.5)
CHLORIDE SERPL-SCNC: 102 MMOL/L (ref 96–112)
CO2 SERPL-SCNC: 24 MMOL/L (ref 20–33)
CREAT SERPL-MCNC: 0.56 MG/DL (ref 0.5–1.4)
EST. AVERAGE GLUCOSE BLD GHB EST-MCNC: 146 MG/DL
GFR SERPLBLD CREATININE-BSD FMLA CKD-EPI: 121 ML/MIN/1.73 M 2
GLOBULIN SER CALC-MCNC: 2.9 G/DL (ref 1.9–3.5)
GLUCOSE SERPL-MCNC: 102 MG/DL (ref 65–99)
HBA1C MFR BLD: 6.7 % (ref 4–5.6)
POTASSIUM SERPL-SCNC: 4.4 MMOL/L (ref 3.6–5.5)
PROT SERPL-MCNC: 8 G/DL (ref 6–8.2)
SODIUM SERPL-SCNC: 138 MMOL/L (ref 135–145)
URATE SERPL-MCNC: 5.8 MG/DL (ref 1.9–8.2)

## 2023-10-05 PROCEDURE — 80053 COMPREHEN METABOLIC PANEL: CPT

## 2023-10-05 PROCEDURE — 83036 HEMOGLOBIN GLYCOSYLATED A1C: CPT

## 2023-10-05 PROCEDURE — 84550 ASSAY OF BLOOD/URIC ACID: CPT

## 2023-10-05 PROCEDURE — 36415 COLL VENOUS BLD VENIPUNCTURE: CPT

## 2023-12-01 ENCOUNTER — OFFICE VISIT (OUTPATIENT)
Dept: MEDICAL GROUP | Facility: PHYSICIAN GROUP | Age: 38
End: 2023-12-01
Payer: COMMERCIAL

## 2023-12-01 VITALS
HEIGHT: 63 IN | BODY MASS INDEX: 33.88 KG/M2 | HEART RATE: 76 BPM | TEMPERATURE: 97.9 F | SYSTOLIC BLOOD PRESSURE: 146 MMHG | OXYGEN SATURATION: 99 % | RESPIRATION RATE: 16 BRPM | DIASTOLIC BLOOD PRESSURE: 88 MMHG | WEIGHT: 191.2 LBS

## 2023-12-01 DIAGNOSIS — N20.0 KIDNEY STONES: ICD-10-CM

## 2023-12-01 DIAGNOSIS — R03.0 ELEVATED BLOOD PRESSURE READING: ICD-10-CM

## 2023-12-01 DIAGNOSIS — R73.9 HYPERGLYCEMIA: ICD-10-CM

## 2023-12-01 DIAGNOSIS — Z00.00 WELLNESS EXAMINATION: ICD-10-CM

## 2023-12-01 DIAGNOSIS — E78.2 ELEVATED TRIGLYCERIDES WITH HIGH CHOLESTEROL: ICD-10-CM

## 2023-12-01 PROCEDURE — 99214 OFFICE O/P EST MOD 30 MIN: CPT | Performed by: FAMILY MEDICINE

## 2023-12-01 PROCEDURE — 3077F SYST BP >= 140 MM HG: CPT | Performed by: FAMILY MEDICINE

## 2023-12-01 PROCEDURE — 3079F DIAST BP 80-89 MM HG: CPT | Performed by: FAMILY MEDICINE

## 2023-12-01 RX ORDER — LOVASTATIN 10 MG/1
10 TABLET ORAL NIGHTLY
Qty: 90 TABLET | Refills: 0 | Status: SHIPPED | OUTPATIENT
Start: 2023-12-01 | End: 2024-03-07

## 2023-12-01 RX ORDER — ALLOPURINOL 100 MG/1
100 TABLET ORAL DAILY
Qty: 90 TABLET | Refills: 1 | Status: SHIPPED | OUTPATIENT
Start: 2023-12-01

## 2023-12-01 RX ORDER — METRONIDAZOLE 500 MG/1
1 TABLET ORAL 2 TIMES DAILY
COMMUNITY
End: 2023-12-01

## 2023-12-01 ASSESSMENT — FIBROSIS 4 INDEX: FIB4 SCORE: 0.26

## 2023-12-02 NOTE — PROGRESS NOTES
"Subjective:     CC:   Chief Complaint   Patient presents with    Follow-Up       HPI:   Olinda presents today follow-up of her labs.  Patient states she is doing well other than being very busy since she is a  and a parent and rushing around a lot.  Patient states since being on the medication for her elevated uric acid she has not had any further issues with kidney stones.    Past Medical History:   Diagnosis Date    Labor abnormal 4/13/2017       Social History     Tobacco Use    Smoking status: Never    Smokeless tobacco: Never   Vaping Use    Vaping Use: Never used   Substance Use Topics    Alcohol use: No    Drug use: No       Current Outpatient Medications Ordered in Epic   Medication Sig Dispense Refill    lovastatin (MEVACOR) 10 MG tablet Take 1 Tablet by mouth every evening. 90 Tablet 0    allopurinol (ZYLOPRIM) 100 MG Tab Take 1 Tablet by mouth every day. 90 Tablet 1     No current Epic-ordered facility-administered medications on file.       Allergies:  Cefdinir    Health Maintenance: Completed    ROS:  Gen: no fevers/chills, no changes in weight  Eyes: no changes in vision  ENT: no sore throat, no hearing loss, no bloody nose  Pulm: no sob, no cough  CV: no chest pain, no palpitations  GI: no nausea/vomiting, no diarrhea  : no dysuria  Neuro: no headaches, no numbness/tingling  Heme/Lymph: no easy bruising    Objective:     Exam:  BP (!) 146/88 (BP Location: Right arm, Patient Position: Sitting, BP Cuff Size: Adult)   Pulse 76   Temp 36.6 °C (97.9 °F) (Temporal)   Resp 16   Ht 1.6 m (5' 3\")   Wt 86.7 kg (191 lb 3.2 oz)   LMP 11/29/2023   SpO2 99%   BMI 33.87 kg/m²  Body mass index is 33.87 kg/m².    Gen: Alert and oriented, No apparent distress.  Skin: Warm and dry.  No obvious lesions.  Eyes: Sclera wnl Pupils normal in size  Lungs: Normal effort, CTA bilaterally, no wheezes, rhonchi, or rales  CV: Regular rate and rhythm. No murmurs, rubs, or gallops.  Musculoskeletal: Normal " gait. No extremity cyanosis, clubbing, or edema.  Neuro: Oriented to person, place and time  Psych: Mood is wnl       Assessment & Plan:     38 y.o. female with the following -     1. Elevated blood pressure reading  This is the first time we had a slightly elevated blood pressure patient would like to avoid salts and come back again for the see me back in another 2 to 3 weeks for a blood pressure check.    2. Hyperglycemia  Patient's hemoglobin A1c is elevated discuss avoiding sugars and breads we will recheck this again in about 3 months patient very of agreeable with this plan    3. Kidney stones  Uric acid is at goal we will continue the allopurinol as planned    4. Elevated triglycerides with high cholesterol  I recommend checking the lipid panel again in 3 months    Other orders  - lovastatin (MEVACOR) 10 MG tablet; Take 1 Tablet by mouth every evening.  Dispense: 90 Tablet; Refill: 0  - allopurinol (ZYLOPRIM) 100 MG Tab; Take 1 Tablet by mouth every day.  Dispense: 90 Tablet; Refill: 1       Return in about 3 weeks (around 12/22/2023), or if symptoms worsen or fail to improve.    Please note that this dictation was created using voice recognition software. I have made every reasonable attempt to correct obvious errors, but I expect that there are errors of grammar and possibly content that I did not discover before finalizing the note.

## 2023-12-04 ENCOUNTER — APPOINTMENT (OUTPATIENT)
Dept: MEDICAL GROUP | Facility: PHYSICIAN GROUP | Age: 38
End: 2023-12-04
Payer: COMMERCIAL

## 2023-12-27 ENCOUNTER — OFFICE VISIT (OUTPATIENT)
Dept: MEDICAL GROUP | Facility: PHYSICIAN GROUP | Age: 38
End: 2023-12-27
Payer: COMMERCIAL

## 2023-12-27 VITALS
DIASTOLIC BLOOD PRESSURE: 84 MMHG | OXYGEN SATURATION: 98 % | SYSTOLIC BLOOD PRESSURE: 138 MMHG | WEIGHT: 193.2 LBS | RESPIRATION RATE: 18 BRPM | BODY MASS INDEX: 34.23 KG/M2 | HEART RATE: 115 BPM | TEMPERATURE: 98.3 F | HEIGHT: 63 IN

## 2023-12-27 DIAGNOSIS — R03.0 ELEVATED BLOOD PRESSURE READING: ICD-10-CM

## 2023-12-27 DIAGNOSIS — R05.1 ACUTE COUGH: ICD-10-CM

## 2023-12-27 LAB
FLUAV RNA SPEC QL NAA+PROBE: NEGATIVE
FLUBV RNA SPEC QL NAA+PROBE: NEGATIVE
RSV RNA SPEC QL NAA+PROBE: NEGATIVE
SARS-COV-2 RNA RESP QL NAA+PROBE: NEGATIVE

## 2023-12-27 PROCEDURE — 3079F DIAST BP 80-89 MM HG: CPT | Performed by: FAMILY MEDICINE

## 2023-12-27 PROCEDURE — 0241U POCT CEPHEID COV-2, FLU A/B, RSV - PCR: CPT | Performed by: FAMILY MEDICINE

## 2023-12-27 PROCEDURE — 99214 OFFICE O/P EST MOD 30 MIN: CPT | Performed by: FAMILY MEDICINE

## 2023-12-27 PROCEDURE — 3075F SYST BP GE 130 - 139MM HG: CPT | Performed by: FAMILY MEDICINE

## 2023-12-27 RX ORDER — AZITHROMYCIN 250 MG/1
TABLET, FILM COATED ORAL
Qty: 6 TABLET | Refills: 0 | Status: SHIPPED | OUTPATIENT
Start: 2023-12-27 | End: 2024-02-22

## 2023-12-27 ASSESSMENT — FIBROSIS 4 INDEX: FIB4 SCORE: 0.26

## 2023-12-27 NOTE — PROGRESS NOTES
"Subjective:     CC:   Chief Complaint   Patient presents with    Follow-Up       HPI:   Olinda presents today for a blood pressure check but she became sick after [place] with a cough.  Patient is now coughing up some yellow material.  Patient states her son also had some type of illness she did take him to the pediatrician and they felt like this was more viral but now he is getting worse.  Patient may have to take him back for a reevaluation.  Patient denies any fever with this    Past Medical History:   Diagnosis Date    Labor abnormal 4/13/2017       Social History     Tobacco Use    Smoking status: Never    Smokeless tobacco: Never   Vaping Use    Vaping Use: Never used   Substance Use Topics    Alcohol use: No    Drug use: No       Current Outpatient Medications Ordered in Epic   Medication Sig Dispense Refill    lovastatin (MEVACOR) 10 MG tablet Take 1 Tablet by mouth every evening. 90 Tablet 0    allopurinol (ZYLOPRIM) 100 MG Tab Take 1 Tablet by mouth every day. 90 Tablet 1     No current Epic-ordered facility-administered medications on file.       Allergies:  Cefdinir    Health Maintenance: Completed    ROS:  Gen: no fevers/chills, no changes in weight  Eyes: no changes in vision  ENT: no sore throat, no hearing loss, no bloody nose  Pulm: no sob, no cough  CV: no chest pain, no palpitations  GI: no nausea/vomiting, no diarrhea  : no dysuria  Neuro: no headaches, no numbness/tingling  Heme/Lymph: no easy bruising    Objective:     Exam:  /84 (BP Location: Left arm, Patient Position: Sitting, BP Cuff Size: Adult)   Pulse (!) 115   Temp 36.8 °C (98.3 °F) (Temporal)   Resp 18   Ht 1.6 m (5' 3\")   Wt 87.6 kg (193 lb 3.2 oz)   LMP 11/29/2023   SpO2 98%   BMI 34.22 kg/m²  Body mass index is 34.22 kg/m².    Gen: Alert and oriented, No apparent distress.  Skin: Warm and dry.  No obvious lesions.  Eyes: Sclera wnl Pupils normal in size  ENT: Canals wnl and TM are not red, there is some clear " fluid behind both TMs mouth negative redness or exudates  Lungs: Normal effort, CTA bilaterally, no wheezes, rhonchi, or rales  CV: Regular rate and rhythm. No murmurs, rubs, or gallops.  Musculoskeletal: Normal gait. No extremity cyanosis, clubbing, or edema.  Neuro: Oriented to person, place and time  Psych: Mood is wnl       Assessment & Plan:     38 y.o. female with the following -     1. Acute cough  Test was run for COVID, flu and RSV everything was negative.  I had my MA contact patient to let her know also would recommend she may need to have her son rechecked if he still having problems.  I would recommend starting her on antibiotics patient to follow-up she continues having problems.  - POCT CoV-2, Flu A/B, RSV by PCR    2. Elevated blood pressure reading  Blood pressure today looks great I will continue to monitor       Return in about 2 months (around 2/27/2024), or if symptoms worsen or fail to improve.  I did preorder some fasting lab work patient should get done prior to seeing me back.  I did offer to have my MA schedule her appointment but she wants to first see when she can get her lab work done and then schedule the appointment after that    Please note that this dictation was created using voice recognition software. I have made every reasonable attempt to correct obvious errors, but I expect that there are errors of grammar and possibly content that I did not discover before finalizing the note.

## 2023-12-30 ENCOUNTER — OFFICE VISIT (OUTPATIENT)
Dept: URGENT CARE | Facility: PHYSICIAN GROUP | Age: 38
End: 2023-12-30
Payer: COMMERCIAL

## 2023-12-30 VITALS
BODY MASS INDEX: 34.2 KG/M2 | HEART RATE: 116 BPM | WEIGHT: 193 LBS | RESPIRATION RATE: 18 BRPM | OXYGEN SATURATION: 96 % | DIASTOLIC BLOOD PRESSURE: 90 MMHG | HEIGHT: 63 IN | SYSTOLIC BLOOD PRESSURE: 138 MMHG | TEMPERATURE: 97.6 F

## 2023-12-30 DIAGNOSIS — J20.9 ACUTE WHEEZY BRONCHITIS: ICD-10-CM

## 2023-12-30 DIAGNOSIS — R03.0 ELEVATED BLOOD PRESSURE READING: ICD-10-CM

## 2023-12-30 DIAGNOSIS — J01.90 ACUTE BACTERIAL SINUSITIS: ICD-10-CM

## 2023-12-30 DIAGNOSIS — B96.89 ACUTE BACTERIAL SINUSITIS: ICD-10-CM

## 2023-12-30 DIAGNOSIS — R00.0 TACHYCARDIA: ICD-10-CM

## 2023-12-30 PROCEDURE — 99214 OFFICE O/P EST MOD 30 MIN: CPT | Performed by: FAMILY MEDICINE

## 2023-12-30 PROCEDURE — 3080F DIAST BP >= 90 MM HG: CPT | Performed by: FAMILY MEDICINE

## 2023-12-30 PROCEDURE — 3075F SYST BP GE 130 - 139MM HG: CPT | Performed by: FAMILY MEDICINE

## 2023-12-30 RX ORDER — HYDROCODONE POLISTIREX AND CHLORPHENIRAMINE POLISTIREX 10; 8 MG/5ML; MG/5ML
5 SUSPENSION, EXTENDED RELEASE ORAL EVERY 12 HOURS PRN
Qty: 70 ML | Refills: 0 | Status: SHIPPED | OUTPATIENT
Start: 2023-12-30 | End: 2024-01-06

## 2023-12-30 RX ORDER — DEXAMETHASONE 6 MG/1
6 TABLET ORAL DAILY
Qty: 3 TABLET | Refills: 0 | Status: SHIPPED | OUTPATIENT
Start: 2023-12-30 | End: 2024-02-22

## 2023-12-30 RX ORDER — ALBUTEROL SULFATE 90 UG/1
2 AEROSOL, METERED RESPIRATORY (INHALATION) EVERY 6 HOURS PRN
Qty: 8.5 G | Refills: 0 | Status: SHIPPED | OUTPATIENT
Start: 2023-12-30 | End: 2024-02-22

## 2023-12-30 RX ORDER — AMOXICILLIN AND CLAVULANATE POTASSIUM 875; 125 MG/1; MG/1
1 TABLET, FILM COATED ORAL 2 TIMES DAILY
Qty: 14 TABLET | Refills: 0 | Status: SHIPPED | OUTPATIENT
Start: 2023-12-30 | End: 2024-01-06

## 2023-12-30 ASSESSMENT — ENCOUNTER SYMPTOMS
COUGH: 1
SINUS PAIN: 1
HEADACHES: 1
SPUTUM PRODUCTION: 1

## 2023-12-30 ASSESSMENT — FIBROSIS 4 INDEX: FIB4 SCORE: 0.26

## 2023-12-30 NOTE — LETTER
December 30, 2023         Patient: Olinda Trevino   YOB: 1985   Date of Visit: 12/30/2023           To Whom it May Concern:    Olinda Trevino was seen in my clinic on 12/30/2023.    If you have any questions or concerns, please don't hesitate to call.        Sincerely,           Mal Duran M.D.  Electronically Signed

## 2023-12-31 NOTE — PROGRESS NOTES
Subjective     Olinda Trevino is a 38 y.o. female who presents with Headache (Earache, cough, nose stuffiness, sinus pressure/X 1 week )      - This is a very pleasant 38 y.o. who has come to the walk-in clinic today for 1 week w/ lots sinus pain/pressure/dc, cough w/ non bloody sputum. No nvfc. Meds not helping, wants to try something different, cough keeps up at night.           ALLERGIES:  Cefdinir     PMH:  Past Medical History:   Diagnosis Date    Labor abnormal 4/13/2017        PSH:  History reviewed. No pertinent surgical history.    MEDS:    Current Outpatient Medications:     albuterol (PROAIR HFA) 108 (90 Base) MCG/ACT Aero Soln inhalation aerosol, Inhale 2 Puffs every 6 hours as needed (cough/wheeze)., Disp: 8.5 g, Rfl: 0    dexamethasone (DECADRON) 6 MG Tab, Take 1 Tablet by mouth every day., Disp: 3 Tablet, Rfl: 0    amoxicillin-clavulanate (AUGMENTIN) 875-125 MG Tab, Take 1 Tablet by mouth 2 times a day for 7 days., Disp: 14 Tablet, Rfl: 0    hydrocod seema-chlorphe seema ER (TUSSIONEX) 10-8 MG/5ML Suspension Extended Release, Take 5 mL by mouth every 12 hours as needed for Cough for up to 7 days., Disp: 70 mL, Rfl: 0    azithromycin (ZITHROMAX) 250 MG Tab, 2 tablets p.o. today then 1 each day for the next 4 more days, Disp: 6 Tablet, Rfl: 0    lovastatin (MEVACOR) 10 MG tablet, Take 1 Tablet by mouth every evening., Disp: 90 Tablet, Rfl: 0    allopurinol (ZYLOPRIM) 100 MG Tab, Take 1 Tablet by mouth every day., Disp: 90 Tablet, Rfl: 1    ** I have documented what I find to be significant in regards to past medical, social, family and surgical history  in my HPI or under PMH/PSH/FH review section, otherwise it is noncontributory **         HPI    Review of Systems   HENT:  Positive for congestion, ear pain and sinus pain.    Respiratory:  Positive for cough and sputum production.    Neurological:  Positive for headaches.   All other systems reviewed and are negative.             Objective  "    BP (!) 138/90   Pulse (!) 116   Temp 36.4 °C (97.6 °F) (Temporal)   Resp 18   Ht 1.6 m (5' 3\")   Wt 87.5 kg (193 lb)   LMP 11/29/2023   SpO2 96%   BMI 34.19 kg/m²      HR recheck 106 (says usually high when sick. BP sometimes normal sometimes high. Following up w/ pcp     Physical Exam  Vitals and nursing note reviewed.   Constitutional:       General: She is not in acute distress.     Appearance: Normal appearance. She is well-developed.   HENT:      Head: Normocephalic.      Right Ear: Tympanic membrane normal.      Left Ear: Tympanic membrane normal.      Mouth/Throat:      Mouth: Mucous membranes are moist.      Pharynx: Oropharynx is clear. No oropharyngeal exudate or posterior oropharyngeal erythema.   Cardiovascular:      Heart sounds: Normal heart sounds. No murmur heard.  Pulmonary:      Effort: Pulmonary effort is normal. No respiratory distress.      Breath sounds: Wheezing (mild end exp) present. No rhonchi or rales.   Neurological:      Mental Status: She is alert.      Motor: No abnormal muscle tone.   Psychiatric:         Mood and Affect: Mood normal.         Behavior: Behavior normal.         Assessment & Plan     1. Acute bacterial sinusitis  amoxicillin-clavulanate (AUGMENTIN) 875-125 MG Tab      2. Acute wheezy bronchitis  albuterol (PROAIR HFA) 108 (90 Base) MCG/ACT Aero Soln inhalation aerosol    dexamethasone (DECADRON) 6 MG Tab    hydrocod seema-chlorphe seema ER (TUSSIONEX) 10-8 MG/5ML Suspension Extended Release      3. Elevated blood pressure reading        4. Tachycardia            - Dx, plan & d/c instructions discussed   - Rest, stay hydrated  - no sudafed with high blood pressure  - OTC Motrin and Flonase needed    Follow up with your regular primary care providers office within a week to keep them updated and informed of this visit and for regular routine health maintenance check-ups. ER if not improving in 2-3 days or if feeling/getting worse.     Patient left in stable " condition     Pertinent prior lab work and/or imaging studies in Epic have been reviewed by me today on day of this visit and taken into account for my treatment and plan today    Pertinent PMH/PSH and/or chronic conditions and medications if any were reviewed today and taken into account for my treatment and plan today    Pertinent prior office visit notes in Epic have been reviewed by me today on day of this visit.    Please note that this dictation may have been created using voice recognition software, if so I have made every reasonable attempt to correct obvious errors, but I expect that there are errors of grammar and possibly content that I did not discover before finalizing the note.

## 2024-02-22 ENCOUNTER — APPOINTMENT (OUTPATIENT)
Dept: URGENT CARE | Facility: PHYSICIAN GROUP | Age: 39
End: 2024-02-22

## 2024-02-22 ENCOUNTER — OFFICE VISIT (OUTPATIENT)
Dept: URGENT CARE | Facility: PHYSICIAN GROUP | Age: 39
End: 2024-02-22
Payer: COMMERCIAL

## 2024-02-22 VITALS
WEIGHT: 189.6 LBS | HEART RATE: 93 BPM | OXYGEN SATURATION: 97 % | BODY MASS INDEX: 33.59 KG/M2 | SYSTOLIC BLOOD PRESSURE: 114 MMHG | HEIGHT: 63 IN | DIASTOLIC BLOOD PRESSURE: 84 MMHG | TEMPERATURE: 97.6 F | RESPIRATION RATE: 16 BRPM

## 2024-02-22 DIAGNOSIS — J32.9 BACTERIAL SINUSITIS: ICD-10-CM

## 2024-02-22 DIAGNOSIS — R05.1 ACUTE COUGH: ICD-10-CM

## 2024-02-22 DIAGNOSIS — B96.89 BACTERIAL SINUSITIS: ICD-10-CM

## 2024-02-22 PROCEDURE — 99213 OFFICE O/P EST LOW 20 MIN: CPT

## 2024-02-22 PROCEDURE — 3074F SYST BP LT 130 MM HG: CPT

## 2024-02-22 PROCEDURE — 3079F DIAST BP 80-89 MM HG: CPT

## 2024-02-22 RX ORDER — DEXTROMETHORPHAN HYDROBROMIDE AND PROMETHAZINE HYDROCHLORIDE 15; 6.25 MG/5ML; MG/5ML
5 SYRUP ORAL EVERY 6 HOURS PRN
Qty: 118 ML | Refills: 0 | Status: SHIPPED | OUTPATIENT
Start: 2024-02-22 | End: 2024-02-29

## 2024-02-22 RX ORDER — BENZONATATE 100 MG/1
100 CAPSULE ORAL 3 TIMES DAILY PRN
Qty: 60 CAPSULE | Refills: 0 | Status: SHIPPED | OUTPATIENT
Start: 2024-02-22

## 2024-02-22 RX ORDER — DOXYCYCLINE 100 MG/1
100 CAPSULE ORAL 2 TIMES DAILY
Qty: 14 CAPSULE | Refills: 0 | Status: SHIPPED | OUTPATIENT
Start: 2024-02-22 | End: 2024-02-29

## 2024-02-22 ASSESSMENT — ENCOUNTER SYMPTOMS
FEVER: 1
SINUS PAIN: 1
HEADACHES: 1
SPUTUM PRODUCTION: 1
COUGH: 1

## 2024-02-22 ASSESSMENT — FIBROSIS 4 INDEX: FIB4 SCORE: 0.26

## 2024-02-22 NOTE — PROGRESS NOTES
Subjective:     CHIEF COMPLAINT  Chief Complaint   Patient presents with    Sinus Problem     X1.5 weeks. Intermittent b/l ear pain, sore throat, fever, excess phlegm production w/ cough and nasal drainage, nasal pressure. Feels like she constantly has to blow her nose.        HPI  Olinda Trevino is a very pleasant 38 y.o. female who presents with sinus pressure, nasal congestion, bilateral ear pain, sore throat, and a persistent cough that is productive of phlegm for the past 10 days.  She has a history of recurrent sinusitis.  She reports that she has been using Flonase at home without relief of symptoms.   she reports that she did have fevers a few days ago that have resolved at this time.    REVIEW OF SYSTEMS  Review of Systems   Constitutional:  Positive for fever (Resolved for several days) and malaise/fatigue.   HENT:  Positive for congestion, ear pain and sinus pain.    Respiratory:  Positive for cough and sputum production.    Neurological:  Positive for headaches.       PAST MEDICAL HISTORY  Patient Active Problem List    Diagnosis Date Noted    Acute cough 12/27/2023    Hyperglycemia 12/01/2023    Elevated blood pressure reading 12/01/2023    Acute non-recurrent frontal sinusitis 06/12/2023    Elevated uric acid in blood 12/19/2022    Kidney stones 10/25/2022    Cholelithiasis 10/25/2022    Weight gain 10/25/2022    Vitamin D deficiency 10/25/2022    Elevated triglycerides with high cholesterol 11/16/2021    Seasonal allergies 05/12/2021    Obesity (BMI 30-39.9) 09/10/2017       SURGICAL HISTORY  patient denies any surgical history    ALLERGIES  Allergies   Allergen Reactions    Cefdinir Rash     Hands and feet broke out in rash, took benadryl        CURRENT MEDICATIONS  Home Medications       Reviewed by Lashay Coles P.A.-C. (Physician Assistant) on 02/22/24 at 1004  Med List Status: <None>     Medication Last Dose Status   albuterol (PROAIR HFA) 108 (90 Base) MCG/ACT Aero Soln  "inhalation aerosol Not Taking Active   allopurinol (ZYLOPRIM) 100 MG Tab Taking Active   azithromycin (ZITHROMAX) 250 MG Tab Not Taking Active   dexamethasone (DECADRON) 6 MG Tab Not Taking Active   lovastatin (MEVACOR) 10 MG tablet Taking Active                    SOCIAL HISTORY  Social History     Tobacco Use    Smoking status: Never    Smokeless tobacco: Never   Vaping Use    Vaping Use: Never used   Substance and Sexual Activity    Alcohol use: No    Drug use: No    Sexual activity: Not Currently     Partners: Male     Birth control/protection: None       FAMILY HISTORY  Family History   Problem Relation Age of Onset    Heart Disease Mother     Hypertension Mother     Cancer Maternal Uncle     Brain Cancer Maternal Uncle     Heart Disease Maternal Uncle     Diabetes Maternal Uncle     Heart Disease Maternal Grandmother     Lung Disease Neg Hx     Stroke Neg Hx           Objective:     VITAL SIGNS: /84 (BP Location: Right arm, Patient Position: Sitting, BP Cuff Size: Adult)   Pulse 93   Temp 36.4 °C (97.6 °F) (Temporal)   Resp 16   Ht 1.6 m (5' 3\")   Wt 86 kg (189 lb 9.5 oz)   SpO2 97%   BMI 33.59 kg/m²     PHYSICAL EXAM  Physical Exam  Vitals reviewed.   Constitutional:       General: She is not in acute distress.     Appearance: Normal appearance. She is ill-appearing. She is not toxic-appearing or diaphoretic.   HENT:      Head: Normocephalic and atraumatic.      Right Ear: Tympanic membrane, ear canal and external ear normal.      Left Ear: Tympanic membrane, ear canal and external ear normal.      Nose: Congestion present.      Mouth/Throat:      Mouth: Mucous membranes are moist.      Pharynx: Posterior oropharyngeal erythema present. No oropharyngeal exudate.   Eyes:      Conjunctiva/sclera: Conjunctivae normal.   Cardiovascular:      Rate and Rhythm: Normal rate and regular rhythm.      Heart sounds: Normal heart sounds.   Pulmonary:      Effort: Pulmonary effort is normal. No respiratory " distress.      Breath sounds: Normal breath sounds. No stridor. No wheezing, rhonchi or rales.   Skin:     General: Skin is warm and dry.      Coloration: Skin is not pale.   Neurological:      General: No focal deficit present.      Mental Status: She is alert.   Psychiatric:         Mood and Affect: Mood normal.       Assessment/Plan:     1. Bacterial sinusitis  - doxycycline (MONODOX) 100 MG capsule; Take 1 Capsule by mouth 2 times a day for 7 days.  Dispense: 14 Capsule; Refill: 0    2. Acute cough  - benzonatate (TESSALON) 100 MG Cap; Take 1 Capsule by mouth 3 times a day as needed for Cough.  Dispense: 60 Capsule; Refill: 0  - promethazine-dextromethorphan (PROMETHAZINE-DM) 6.25-15 MG/5ML syrup; Take 5 mL by mouth every 6 hours as needed for Cough for up to 7 days. (caution: may cause sedation)  Dispense: 118 mL; Refill: 0  -Tylenol/Ibuprofen OTC for discomfort  -Sinus flushes using distilled water and saline packet  -Continue use of Flonase  -Follow-up with primary care provider  -Return to clinic if symptoms worsen or fail to resolve    MDM/Comments:  I have prepared for this visit by personally reviewing the patient's prevous medical records, vitals, and labs including: most recent GFR and CMP. Patient has stable vital signs and is non-toxic appearing. Discussed supportive care with hydration, rest, Tylenol/Ibuprofen as needed.  Recommend use of Flonase for congestion and possible allergy component of symptoms.  Discussed use of saline nasal flushes using distilled water.  Discussed that sinus infections are typically viral, yet given duration of symptoms I suspect a bacterial origin of symptoms in this case.  Antibiotic treatment started.  Patient provided benzonatate for daytime cough relief and Promethazine DM for nighttime cough relief.  Patient demonstrated understanding of treatment plan at this time and will RTC if symptoms worsen or fail to resolve.       Differential diagnosis, natural history,  supportive care, and indications for immediate follow-up discussed. All questions answered. Patient agrees with the plan of care.    Follow-up as needed if symptoms worsen or fail to improve to PCP, Urgent care or Emergency Room.    I have personally reviewed prior external notes and test results pertinent to today's visit.  I have independently reviewed and interpreted all diagnostics ordered during this urgent care acute visit.   Discussed management options (risks,benefits, and alternatives to treatment). Pt expresses understanding and the treatment plan was agreed upon. Questions were encouraged and answered to pt's satisfaction.    Please note that this dictation was created using voice recognition software. I have made a reasonable attempt to correct obvious errors, but I expect that there are errors of grammar and possibly content that I did not discover before finalizing the note.

## 2024-02-22 NOTE — LETTER
February 22, 2024    To Whom It May Concern:         This is confirmation that Olinda Trevino attended her scheduled appointment with Lashay Coles P.A.-C. on 2/22/24.         If you have any questions please do not hesitate to call me at the phone number listed below.    Sincerely,          Lashay Coles P.A.-C.  [phone]

## 2024-03-07 RX ORDER — LOVASTATIN 10 MG/1
10 TABLET ORAL NIGHTLY
Qty: 90 TABLET | Refills: 0 | Status: SHIPPED | OUTPATIENT
Start: 2024-03-07

## 2024-04-12 ENCOUNTER — HOSPITAL ENCOUNTER (OUTPATIENT)
Dept: LAB | Facility: MEDICAL CENTER | Age: 39
End: 2024-04-12
Attending: FAMILY MEDICINE
Payer: COMMERCIAL

## 2024-04-12 DIAGNOSIS — E78.2 ELEVATED TRIGLYCERIDES WITH HIGH CHOLESTEROL: ICD-10-CM

## 2024-04-12 DIAGNOSIS — R73.9 HYPERGLYCEMIA: ICD-10-CM

## 2024-04-12 DIAGNOSIS — Z00.00 WELLNESS EXAMINATION: ICD-10-CM

## 2024-04-12 LAB
ALBUMIN SERPL BCP-MCNC: 4.8 G/DL (ref 3.2–4.9)
ALBUMIN/GLOB SERPL: 1.9 G/DL
ALP SERPL-CCNC: 87 U/L (ref 30–99)
ALT SERPL-CCNC: 29 U/L (ref 2–50)
ANION GAP SERPL CALC-SCNC: 15 MMOL/L (ref 7–16)
AST SERPL-CCNC: 22 U/L (ref 12–45)
BILIRUB SERPL-MCNC: 0.4 MG/DL (ref 0.1–1.5)
BUN SERPL-MCNC: 11 MG/DL (ref 8–22)
CALCIUM ALBUM COR SERPL-MCNC: 8.8 MG/DL (ref 8.5–10.5)
CALCIUM SERPL-MCNC: 9.4 MG/DL (ref 8.5–10.5)
CHLORIDE SERPL-SCNC: 101 MMOL/L (ref 96–112)
CHOLEST SERPL-MCNC: 246 MG/DL (ref 100–199)
CO2 SERPL-SCNC: 22 MMOL/L (ref 20–33)
CREAT SERPL-MCNC: 0.53 MG/DL (ref 0.5–1.4)
EST. AVERAGE GLUCOSE BLD GHB EST-MCNC: 151 MG/DL
FASTING STATUS PATIENT QL REPORTED: NORMAL
GFR SERPLBLD CREATININE-BSD FMLA CKD-EPI: 122 ML/MIN/1.73 M 2
GLOBULIN SER CALC-MCNC: 2.5 G/DL (ref 1.9–3.5)
GLUCOSE SERPL-MCNC: 132 MG/DL (ref 65–99)
HBA1C MFR BLD: 6.9 % (ref 4–5.6)
HDLC SERPL-MCNC: 46 MG/DL
LDLC SERPL CALC-MCNC: 168 MG/DL
POTASSIUM SERPL-SCNC: 4.1 MMOL/L (ref 3.6–5.5)
PROT SERPL-MCNC: 7.3 G/DL (ref 6–8.2)
SODIUM SERPL-SCNC: 138 MMOL/L (ref 135–145)
TRIGL SERPL-MCNC: 160 MG/DL (ref 0–149)

## 2024-04-12 PROCEDURE — 36415 COLL VENOUS BLD VENIPUNCTURE: CPT

## 2024-04-12 PROCEDURE — 83036 HEMOGLOBIN GLYCOSYLATED A1C: CPT

## 2024-04-12 PROCEDURE — 86787 VARICELLA-ZOSTER ANTIBODY: CPT

## 2024-04-12 PROCEDURE — 80061 LIPID PANEL: CPT

## 2024-04-12 PROCEDURE — 80053 COMPREHEN METABOLIC PANEL: CPT

## 2024-04-14 LAB — VZV IGG SER IA-ACNC: 555.5 IV

## 2024-04-24 ENCOUNTER — OFFICE VISIT (OUTPATIENT)
Dept: MEDICAL GROUP | Facility: PHYSICIAN GROUP | Age: 39
End: 2024-04-24
Payer: COMMERCIAL

## 2024-04-24 VITALS
TEMPERATURE: 98.1 F | WEIGHT: 193.2 LBS | HEIGHT: 63 IN | OXYGEN SATURATION: 96 % | BODY MASS INDEX: 34.23 KG/M2 | DIASTOLIC BLOOD PRESSURE: 76 MMHG | SYSTOLIC BLOOD PRESSURE: 136 MMHG | RESPIRATION RATE: 16 BRPM | HEART RATE: 107 BPM

## 2024-04-24 DIAGNOSIS — R73.9 HYPERGLYCEMIA: ICD-10-CM

## 2024-04-24 DIAGNOSIS — E78.2 ELEVATED TRIGLYCERIDES WITH HIGH CHOLESTEROL: ICD-10-CM

## 2024-04-24 DIAGNOSIS — R63.5 WEIGHT GAIN: ICD-10-CM

## 2024-04-24 DIAGNOSIS — N20.0 KIDNEY STONES: ICD-10-CM

## 2024-04-24 PROCEDURE — 99214 OFFICE O/P EST MOD 30 MIN: CPT | Performed by: FAMILY MEDICINE

## 2024-04-24 PROCEDURE — 3075F SYST BP GE 130 - 139MM HG: CPT | Performed by: FAMILY MEDICINE

## 2024-04-24 PROCEDURE — 3078F DIAST BP <80 MM HG: CPT | Performed by: FAMILY MEDICINE

## 2024-04-24 ASSESSMENT — FIBROSIS 4 INDEX: FIB4 SCORE: 0.36

## 2024-04-24 ASSESSMENT — PATIENT HEALTH QUESTIONNAIRE - PHQ9: CLINICAL INTERPRETATION OF PHQ2 SCORE: 0

## 2024-04-24 NOTE — PROGRESS NOTES
"Subjective:     CC:   Chief Complaint   Patient presents with    Follow-Up       HPI:   Olinda presents today for follow-up patient states she is doing well she admits she has been stress eating recently but started walking more.  Patient admits she is drinking a little bit more so this seems to help her when she is stressed    Past Medical History:   Diagnosis Date    Labor abnormal 4/13/2017       Social History     Tobacco Use    Smoking status: Never    Smokeless tobacco: Never   Vaping Use    Vaping Use: Never used   Substance Use Topics    Alcohol use: No    Drug use: No       Current Outpatient Medications Ordered in Epic   Medication Sig Dispense Refill    lovastatin (MEVACOR) 10 MG tablet TAKE 1 TABLET BY MOUTH EVERY EVENING 90 Tablet 0    allopurinol (ZYLOPRIM) 100 MG Tab Take 1 Tablet by mouth every day. 90 Tablet 1     No current Epic-ordered facility-administered medications on file.       Allergies:  Cefdinir    Health Maintenance: Completed    ROS:  Gen: no fevers/chills, no changes in weight  Eyes: no changes in vision  ENT: no sore throat, no hearing loss, no bloody nose  Pulm: no sob, no cough  CV: no chest pain, no palpitations  GI: no nausea/vomiting, no diarrhea  : no dysuria  Neuro: no headaches, no numbness/tingling  Heme/Lymph: no easy bruising    Objective:     Exam:  /76 (BP Location: Right arm, Patient Position: Sitting, BP Cuff Size: Adult)   Pulse (!) 107   Temp 36.7 °C (98.1 °F) (Temporal)   Resp 16   Ht 1.6 m (5' 3\")   Wt 87.6 kg (193 lb 3.2 oz)   LMP 03/30/2024 (Approximate)   SpO2 96%   BMI 34.22 kg/m²  Body mass index is 34.22 kg/m².    Gen: Alert and oriented, No apparent distress.  Skin: Warm and dry.  No obvious lesions.  Eyes: Sclera wnl Pupils normal in size  Lungs: Normal effort, CTA bilaterally, no wheezes, rhonchi, or rales  CV: Regular rate and rhythm. No murmurs, rubs, or gallops.  Musculoskeletal: Normal gait. No extremity cyanosis, clubbing, or " edema.  Neuro: Oriented to person, place and time  Psych: Mood is wnl       Assessment & Plan:     38 y.o. female with the following -     1. Hyperglycemia  Patient's fasting glucose was 132 her hemoglobin A1c was 6.9.  Patient did have a history of gestational diabetes but during her pregnancy she was diet control and did not have to be put on insulin.  Patient would like to try to modify her diet at this point would recommend repeating a fasting glucose in 2 weeks we will contact patient if it still around 132 would recommend starting her on metformin.  Patient was agreeable with the plan  - Comp Metabolic Panel; Future    2. Weight gain  Patient will be working on her weight and exercising more    3. Kidney stones  Since being on allopurinol patient has had no issues with kidney stones    4. Elevated triglycerides with high cholesterol  I will recheck her lipid panel again in 3 months       Return in about 3 months (around 7/24/2024), or if symptoms worsen or fail to improve.    Please note that this dictation was created using voice recognition software. I have made every reasonable attempt to correct obvious errors, but I expect that there are errors of grammar and possibly content that I did not discover before finalizing the note.

## 2024-05-14 ENCOUNTER — APPOINTMENT (OUTPATIENT)
Dept: MEDICAL GROUP | Facility: PHYSICIAN GROUP | Age: 39
End: 2024-05-14
Payer: COMMERCIAL

## 2024-05-15 DIAGNOSIS — E78.2 ELEVATED TRIGLYCERIDES WITH HIGH CHOLESTEROL: ICD-10-CM

## 2024-05-15 DIAGNOSIS — R73.9 HYPERGLYCEMIA: ICD-10-CM

## 2024-05-15 DIAGNOSIS — N20.0 KIDNEY STONES: ICD-10-CM

## 2024-05-24 RX ORDER — ALLOPURINOL 100 MG/1
100 TABLET ORAL DAILY
Qty: 90 TABLET | Refills: 1 | Status: SHIPPED | OUTPATIENT
Start: 2024-05-24

## 2024-05-24 RX ORDER — LOVASTATIN 10 MG/1
10 TABLET ORAL NIGHTLY
Qty: 90 TABLET | Refills: 0 | Status: SHIPPED | OUTPATIENT
Start: 2024-05-24

## 2024-08-13 ENCOUNTER — OFFICE VISIT (OUTPATIENT)
Dept: URGENT CARE | Facility: PHYSICIAN GROUP | Age: 39
End: 2024-08-13
Payer: COMMERCIAL

## 2024-08-13 VITALS
DIASTOLIC BLOOD PRESSURE: 76 MMHG | HEIGHT: 63 IN | WEIGHT: 188.93 LBS | HEART RATE: 88 BPM | RESPIRATION RATE: 16 BRPM | OXYGEN SATURATION: 97 % | TEMPERATURE: 98.4 F | BODY MASS INDEX: 33.48 KG/M2 | SYSTOLIC BLOOD PRESSURE: 132 MMHG

## 2024-08-13 DIAGNOSIS — J32.9 RECURRENT SINUSITIS: ICD-10-CM

## 2024-08-13 PROCEDURE — 99213 OFFICE O/P EST LOW 20 MIN: CPT | Performed by: FAMILY MEDICINE

## 2024-08-13 PROCEDURE — 3078F DIAST BP <80 MM HG: CPT | Performed by: FAMILY MEDICINE

## 2024-08-13 PROCEDURE — 3075F SYST BP GE 130 - 139MM HG: CPT | Performed by: FAMILY MEDICINE

## 2024-08-13 ASSESSMENT — ENCOUNTER SYMPTOMS
HEADACHES: 1
WEIGHT LOSS: 0
MYALGIAS: 0
VOMITING: 0
EYE DISCHARGE: 0
NAUSEA: 0
EYE REDNESS: 0

## 2024-08-13 ASSESSMENT — FIBROSIS 4 INDEX: FIB4 SCORE: 0.37

## 2024-08-13 NOTE — LETTER
August 13, 2024         Patient: Olinda Trevino   YOB: 1985   Date of Visit: 8/13/2024           To Whom it May Concern:    Olinda Trevino was seen in my clinic on 8/13/2024. Please excuse from work tomorrow 8/14/2024.       Sincerely,           Eddie Castle M.D.  Electronically Signed

## 2024-08-14 NOTE — PROGRESS NOTES
"Subjective     Olinda Trevino is a 39 y.o. female who presents with Sinusitis (X 1 week)            PMH recurrent sinusitis.  She was referred to ENT proximally 1 year ago however she was not aware that this was done or processed.  She continues to have recurrent symptoms and would like another referral.  Current episode is 1 week with sinus pressure and drainage.  No fever.  She is using Flonase daily.  Occasional nasal saline.  No cough.  No other aggravating or alleviating factors.        Review of Systems   Constitutional:  Negative for malaise/fatigue and weight loss.   Eyes:  Negative for discharge and redness.   Gastrointestinal:  Negative for nausea and vomiting.   Musculoskeletal:  Negative for joint pain and myalgias.   Skin:  Negative for itching and rash.   Neurological:  Positive for headaches.              Objective     /76   Pulse 88   Temp 36.9 °C (98.4 °F)   Resp 16   Ht 1.6 m (5' 3\")   Wt 85.7 kg (188 lb 15 oz)   SpO2 97%   BMI 33.47 kg/m²      Physical Exam  Constitutional:       General: She is not in acute distress.     Appearance: She is well-developed.   HENT:      Head: Normocephalic and atraumatic.      Nose: Congestion present.      Mouth/Throat:      Comments: PND  Eyes:      Conjunctiva/sclera: Conjunctivae normal.   Cardiovascular:      Rate and Rhythm: Normal rate and regular rhythm.      Heart sounds: Normal heart sounds. No murmur heard.  Pulmonary:      Effort: Pulmonary effort is normal.      Breath sounds: Normal breath sounds. No wheezing.   Skin:     General: Skin is warm and dry.      Findings: No rash.   Neurological:      Mental Status: She is alert.                             Assessment & Plan        1. Recurrent sinusitis  amoxicillin-clavulanate (AUGMENTIN) 875-125 MG Tab    Referral to ENT        Continue nasal saline and nasal corticosteroid.    Differential diagnosis, natural history, supportive care, and indications for immediate follow-up were " discussed.

## 2024-08-29 RX ORDER — LOVASTATIN 10 MG
10 TABLET ORAL NIGHTLY
Qty: 90 TABLET | Refills: 0 | Status: SHIPPED | OUTPATIENT
Start: 2024-08-29

## 2024-10-01 ENCOUNTER — HOSPITAL ENCOUNTER (OUTPATIENT)
Dept: LAB | Facility: MEDICAL CENTER | Age: 39
End: 2024-10-01
Attending: FAMILY MEDICINE
Payer: COMMERCIAL

## 2024-10-01 DIAGNOSIS — N20.0 KIDNEY STONES: ICD-10-CM

## 2024-10-01 DIAGNOSIS — E78.2 ELEVATED TRIGLYCERIDES WITH HIGH CHOLESTEROL: ICD-10-CM

## 2024-10-01 DIAGNOSIS — R73.9 HYPERGLYCEMIA: ICD-10-CM

## 2024-10-01 LAB
ALBUMIN SERPL BCP-MCNC: 4.7 G/DL (ref 3.2–4.9)
ALBUMIN/GLOB SERPL: 1.7 G/DL
ALP SERPL-CCNC: 89 U/L (ref 30–99)
ALT SERPL-CCNC: 22 U/L (ref 2–50)
ANION GAP SERPL CALC-SCNC: 15 MMOL/L (ref 7–16)
AST SERPL-CCNC: 19 U/L (ref 12–45)
BILIRUB SERPL-MCNC: 0.4 MG/DL (ref 0.1–1.5)
BUN SERPL-MCNC: 10 MG/DL (ref 8–22)
CALCIUM ALBUM COR SERPL-MCNC: 9.2 MG/DL (ref 8.5–10.5)
CALCIUM SERPL-MCNC: 9.8 MG/DL (ref 8.5–10.5)
CHLORIDE SERPL-SCNC: 101 MMOL/L (ref 96–112)
CHOLEST SERPL-MCNC: 251 MG/DL (ref 100–199)
CO2 SERPL-SCNC: 21 MMOL/L (ref 20–33)
CREAT SERPL-MCNC: 0.55 MG/DL (ref 0.5–1.4)
EST. AVERAGE GLUCOSE BLD GHB EST-MCNC: 154 MG/DL
FASTING STATUS PATIENT QL REPORTED: NORMAL
GFR SERPLBLD CREATININE-BSD FMLA CKD-EPI: 121 ML/MIN/1.73 M 2
GLOBULIN SER CALC-MCNC: 2.8 G/DL (ref 1.9–3.5)
GLUCOSE SERPL-MCNC: 141 MG/DL (ref 65–99)
HBA1C MFR BLD: 7 % (ref 4–5.6)
HDLC SERPL-MCNC: 45 MG/DL
LDLC SERPL CALC-MCNC: 167 MG/DL
POTASSIUM SERPL-SCNC: 4.3 MMOL/L (ref 3.6–5.5)
PROT SERPL-MCNC: 7.5 G/DL (ref 6–8.2)
SODIUM SERPL-SCNC: 137 MMOL/L (ref 135–145)
TRIGL SERPL-MCNC: 194 MG/DL (ref 0–149)
URATE SERPL-MCNC: 5 MG/DL (ref 1.9–8.2)

## 2024-10-01 PROCEDURE — 36415 COLL VENOUS BLD VENIPUNCTURE: CPT

## 2024-10-01 PROCEDURE — 80053 COMPREHEN METABOLIC PANEL: CPT

## 2024-10-01 PROCEDURE — 84550 ASSAY OF BLOOD/URIC ACID: CPT

## 2024-10-01 PROCEDURE — 83036 HEMOGLOBIN GLYCOSYLATED A1C: CPT

## 2024-10-01 PROCEDURE — 80061 LIPID PANEL: CPT

## 2024-10-03 ENCOUNTER — APPOINTMENT (OUTPATIENT)
Dept: MEDICAL GROUP | Facility: PHYSICIAN GROUP | Age: 39
End: 2024-10-03
Payer: COMMERCIAL

## 2024-10-03 VITALS
HEART RATE: 107 BPM | RESPIRATION RATE: 16 BRPM | DIASTOLIC BLOOD PRESSURE: 86 MMHG | HEIGHT: 63 IN | TEMPERATURE: 98.3 F | BODY MASS INDEX: 33.52 KG/M2 | SYSTOLIC BLOOD PRESSURE: 130 MMHG | OXYGEN SATURATION: 98 % | WEIGHT: 189.2 LBS

## 2024-10-03 DIAGNOSIS — E78.2 ELEVATED TRIGLYCERIDES WITH HIGH CHOLESTEROL: ICD-10-CM

## 2024-10-03 DIAGNOSIS — F41.9 ANXIETY AND DEPRESSION: ICD-10-CM

## 2024-10-03 DIAGNOSIS — R73.9 HYPERGLYCEMIA: ICD-10-CM

## 2024-10-03 DIAGNOSIS — Z23 NEED FOR VACCINATION: ICD-10-CM

## 2024-10-03 DIAGNOSIS — R03.0 ELEVATED BLOOD PRESSURE READING: ICD-10-CM

## 2024-10-03 DIAGNOSIS — N20.0 KIDNEY STONES: ICD-10-CM

## 2024-10-03 DIAGNOSIS — F32.A ANXIETY AND DEPRESSION: ICD-10-CM

## 2024-10-03 PROCEDURE — 90471 IMMUNIZATION ADMIN: CPT | Performed by: FAMILY MEDICINE

## 2024-10-03 PROCEDURE — 90656 IIV3 VACC NO PRSV 0.5 ML IM: CPT | Performed by: FAMILY MEDICINE

## 2024-10-03 PROCEDURE — 3075F SYST BP GE 130 - 139MM HG: CPT | Performed by: FAMILY MEDICINE

## 2024-10-03 PROCEDURE — 3079F DIAST BP 80-89 MM HG: CPT | Performed by: FAMILY MEDICINE

## 2024-10-03 PROCEDURE — 99214 OFFICE O/P EST MOD 30 MIN: CPT | Mod: 25 | Performed by: FAMILY MEDICINE

## 2024-10-03 RX ORDER — METFORMIN HYDROCHLORIDE 500 MG/1
500 TABLET, EXTENDED RELEASE ORAL DAILY
Qty: 90 TABLET | Refills: 0 | Status: SHIPPED | OUTPATIENT
Start: 2024-10-03 | End: 2025-01-01

## 2024-10-03 RX ORDER — LOVASTATIN 10 MG/1
10 TABLET ORAL NIGHTLY
Qty: 90 TABLET | Refills: 1 | Status: SHIPPED | OUTPATIENT
Start: 2024-10-03

## 2024-10-03 ASSESSMENT — FIBROSIS 4 INDEX: FIB4 SCORE: 0.37

## 2024-10-31 ENCOUNTER — APPOINTMENT (OUTPATIENT)
Dept: MEDICAL GROUP | Facility: PHYSICIAN GROUP | Age: 39
End: 2024-10-31
Payer: COMMERCIAL

## 2024-11-27 RX ORDER — LOVASTATIN 10 MG/1
10 TABLET ORAL NIGHTLY
Qty: 90 TABLET | Refills: 1 | OUTPATIENT
Start: 2024-11-27

## 2024-11-28 NOTE — TELEPHONE ENCOUNTER
Received request via: Pharmacy    Was the patient seen in the last year in this department? Yes    Does the patient have an active prescription (recently filled or refills available) for medication(s) requested? No    Pharmacy Name:   [id number] - [place], [place] - [facility] [address] [facility] [address] [place] [address]  Phone: [phone] Fax: [phone]        Does the patient have shelter Plus and need 100-day supply? (This applies to ALL medications) Patient does not have SCP

## 2024-12-01 RX ORDER — ALLOPURINOL 100 MG/1
100 TABLET ORAL DAILY
Qty: 90 TABLET | Refills: 0 | Status: SHIPPED | OUTPATIENT
Start: 2024-12-01

## 2024-12-03 ENCOUNTER — NON-PROVIDER VISIT (OUTPATIENT)
Dept: OCCUPATIONAL MEDICINE | Facility: CLINIC | Age: 39
End: 2024-12-03

## 2024-12-03 ENCOUNTER — HOSPITAL ENCOUNTER (OUTPATIENT)
Facility: MEDICAL CENTER | Age: 39
End: 2024-12-03
Attending: PREVENTIVE MEDICINE
Payer: COMMERCIAL

## 2024-12-03 DIAGNOSIS — Z02.89 ENCOUNTER FOR OCCUPATIONAL HEALTH EXAMINATION: ICD-10-CM

## 2024-12-03 DIAGNOSIS — Z02.89 ENCOUNTER FOR OCCUPATIONAL HEALTH EXAMINATION: Primary | ICD-10-CM

## 2024-12-03 PROCEDURE — 86480 TB TEST CELL IMMUN MEASURE: CPT | Performed by: PREVENTIVE MEDICINE

## 2024-12-04 LAB
GAMMA INTERFERON BACKGROUND BLD IA-ACNC: 0.07 IU/ML
M TB IFN-G BLD-IMP: NEGATIVE
M TB IFN-G CD4+ BCKGRND COR BLD-ACNC: 0.02 IU/ML
MITOGEN IGNF BCKGRD COR BLD-ACNC: >10 IU/ML
QFT TB2 - NIL TBQ2: 0.02 IU/ML

## 2024-12-09 ENCOUNTER — OFFICE VISIT (OUTPATIENT)
Dept: URGENT CARE | Facility: PHYSICIAN GROUP | Age: 39
End: 2024-12-09
Payer: COMMERCIAL

## 2024-12-09 VITALS
SYSTOLIC BLOOD PRESSURE: 140 MMHG | OXYGEN SATURATION: 97 % | DIASTOLIC BLOOD PRESSURE: 100 MMHG | BODY MASS INDEX: 33.84 KG/M2 | TEMPERATURE: 98.8 F | RESPIRATION RATE: 16 BRPM | WEIGHT: 191 LBS | HEIGHT: 63 IN | HEART RATE: 110 BPM

## 2024-12-09 DIAGNOSIS — J01.01 ACUTE RECURRENT MAXILLARY SINUSITIS: ICD-10-CM

## 2024-12-09 DIAGNOSIS — H66.92 OM (OTITIS MEDIA), RECURRENT, LEFT: ICD-10-CM

## 2024-12-09 PROCEDURE — 3077F SYST BP >= 140 MM HG: CPT | Performed by: FAMILY MEDICINE

## 2024-12-09 PROCEDURE — 3080F DIAST BP >= 90 MM HG: CPT | Performed by: FAMILY MEDICINE

## 2024-12-09 PROCEDURE — 99213 OFFICE O/P EST LOW 20 MIN: CPT | Performed by: FAMILY MEDICINE

## 2024-12-09 RX ORDER — CLARITHROMYCIN 500 MG/1
500 TABLET ORAL 2 TIMES DAILY
Qty: 20 TABLET | Refills: 0 | Status: SHIPPED | OUTPATIENT
Start: 2024-12-09 | End: 2024-12-17

## 2024-12-09 ASSESSMENT — ENCOUNTER SYMPTOMS
EYES NEGATIVE: 1
MUSCULOSKELETAL NEGATIVE: 1
COUGH: 1
GASTROINTESTINAL NEGATIVE: 1
CARDIOVASCULAR NEGATIVE: 1
SINUS PAIN: 1

## 2024-12-09 NOTE — PROGRESS NOTES
"Subjective:   Olinda Trevino is a 39 y.o. female who presents for Sinusitis (With ear pain x 1 week. )      HPI    Review of Systems   Constitutional:  Positive for malaise/fatigue.   HENT:  Positive for congestion, ear pain, hearing loss and sinus pain.    Eyes: Negative.    Respiratory:  Positive for cough.    Cardiovascular: Negative.    Gastrointestinal: Negative.    Genitourinary: Negative.    Musculoskeletal: Negative.    Skin: Negative.        Medications, Allergies, and current problem list reviewed today in Epic.     Objective:     BP (!) 140/100 (BP Location: Left arm, Patient Position: Sitting, BP Cuff Size: Adult long)   Pulse (!) 110   Temp 37.1 °C (98.8 °F) (Temporal)   Resp 16   Ht 1.6 m (5' 3\")   Wt 86.6 kg (191 lb)   SpO2 97%     Physical Exam  Vitals and nursing note reviewed.   Constitutional:       Appearance: Normal appearance.   HENT:      Head: Normocephalic and atraumatic.      Ears:      Comments: Left tm red and bulging     Nose: Congestion present.      Mouth/Throat:      Pharynx: Oropharynx is clear.   Eyes:      Extraocular Movements: Extraocular movements intact.      Pupils: Pupils are equal, round, and reactive to light.   Cardiovascular:      Rate and Rhythm: Regular rhythm. Tachycardia present.      Pulses: Normal pulses.      Heart sounds: Normal heart sounds.   Pulmonary:      Effort: Pulmonary effort is normal.      Breath sounds: Normal breath sounds.   Abdominal:      General: Abdomen is flat. Bowel sounds are normal.      Palpations: Abdomen is soft.   Musculoskeletal:      Cervical back: Normal range of motion and neck supple. No tenderness.   Lymphadenopathy:      Cervical: No cervical adenopathy.   Neurological:      Mental Status: She is alert.         Assessment/Plan:     Diagnosis and associated orders:     1. OM (otitis media), recurrent, left  clarithromycin (BIAXIN) 500 MG Tab      2. Acute recurrent maxillary sinusitis  clarithromycin (BIAXIN) 500 MG " Tab         Comments/MDM:              Differential diagnosis, natural history, supportive care, and indications for immediate follow-up discussed.    Advised the patient to follow-up with the primary care physician for recheck, reevaluation, and consideration of further management.    Please note that this dictation was created using voice recognition software. I have made a reasonable attempt to correct obvious errors, but I expect that there are errors of grammar and possibly content that I did not discover before finalizing the note.    This note was electronically signed by Hari Elliott M.D.

## 2024-12-09 NOTE — LETTER
[facility] [address] [address]     December 9, 2024    Patient: Olinda Trevino   YOB: 1985   Date of Visit: 12/9/2024       To Whom It May Concern:    Olinda Trevino was seen and treated in our department on 12/9/2024. Please excuse 12/9-12/10.    Sincerely,     Hari Elliott M.D.

## 2024-12-17 ENCOUNTER — HOSPITAL ENCOUNTER (OUTPATIENT)
Dept: RADIOLOGY | Facility: MEDICAL CENTER | Age: 39
End: 2024-12-17
Attending: PHYSICIAN ASSISTANT
Payer: COMMERCIAL

## 2024-12-17 ENCOUNTER — OFFICE VISIT (OUTPATIENT)
Dept: URGENT CARE | Facility: PHYSICIAN GROUP | Age: 39
End: 2024-12-17
Payer: COMMERCIAL

## 2024-12-17 VITALS
SYSTOLIC BLOOD PRESSURE: 136 MMHG | DIASTOLIC BLOOD PRESSURE: 82 MMHG | HEIGHT: 63 IN | RESPIRATION RATE: 20 BRPM | WEIGHT: 189.9 LBS | TEMPERATURE: 98.2 F | HEART RATE: 116 BPM | OXYGEN SATURATION: 98 % | BODY MASS INDEX: 33.65 KG/M2

## 2024-12-17 DIAGNOSIS — R05.1 ACUTE COUGH: ICD-10-CM

## 2024-12-17 DIAGNOSIS — J98.8 RESPIRATORY TRACT INFECTION: ICD-10-CM

## 2024-12-17 PROCEDURE — 99214 OFFICE O/P EST MOD 30 MIN: CPT | Performed by: PHYSICIAN ASSISTANT

## 2024-12-17 PROCEDURE — 71046 X-RAY EXAM CHEST 2 VIEWS: CPT

## 2024-12-17 PROCEDURE — 3079F DIAST BP 80-89 MM HG: CPT | Performed by: PHYSICIAN ASSISTANT

## 2024-12-17 PROCEDURE — 3075F SYST BP GE 130 - 139MM HG: CPT | Performed by: PHYSICIAN ASSISTANT

## 2024-12-17 RX ORDER — DEXTROMETHORPHAN HYDROBROMIDE AND PROMETHAZINE HYDROCHLORIDE 15; 6.25 MG/5ML; MG/5ML
5 SYRUP ORAL EVERY 4 HOURS PRN
Qty: 120 ML | Refills: 0 | Status: SHIPPED | OUTPATIENT
Start: 2024-12-17

## 2024-12-17 RX ORDER — METHYLPREDNISOLONE 4 MG/1
TABLET ORAL
Qty: 21 TABLET | Refills: 0 | Status: SHIPPED | OUTPATIENT
Start: 2024-12-17

## 2024-12-17 RX ORDER — ALBUTEROL SULFATE 90 UG/1
2 INHALANT RESPIRATORY (INHALATION) EVERY 6 HOURS PRN
Qty: 8.5 G | Refills: 0 | Status: SHIPPED | OUTPATIENT
Start: 2024-12-17

## 2024-12-17 ASSESSMENT — ENCOUNTER SYMPTOMS
HEADACHES: 1
NAUSEA: 1
CHILLS: 1
SPUTUM PRODUCTION: 1
WHEEZING: 1
VOMITING: 1
SORE THROAT: 1
SHORTNESS OF BREATH: 1
DIARRHEA: 1
SINUS PAIN: 1
ABDOMINAL PAIN: 1
COUGH: 1
FEVER: 0
MYALGIAS: 1

## 2024-12-17 NOTE — LETTER
December 17, 2024         Patient: Olinda Trevino   YOB: 1985   Date of Visit: 12/17/2024           To Whom it May Concern:    Olinda Trevino was seen in my clinic on 12/17/2024.  Please excuse her absence from work for today and tomorrow.      Sincerely,           Ivonne Hong P.A.-C.  Electronically Signed

## 2024-12-23 ENCOUNTER — APPOINTMENT (OUTPATIENT)
Dept: MEDICAL GROUP | Facility: PHYSICIAN GROUP | Age: 39
End: 2024-12-23
Payer: COMMERCIAL

## 2024-12-23 VITALS
SYSTOLIC BLOOD PRESSURE: 124 MMHG | WEIGHT: 186.7 LBS | BODY MASS INDEX: 33.08 KG/M2 | HEIGHT: 63 IN | HEART RATE: 100 BPM | DIASTOLIC BLOOD PRESSURE: 68 MMHG | TEMPERATURE: 98.2 F | OXYGEN SATURATION: 100 % | RESPIRATION RATE: 16 BRPM

## 2024-12-23 DIAGNOSIS — R03.0 ELEVATED BLOOD PRESSURE READING: ICD-10-CM

## 2024-12-23 DIAGNOSIS — F32.A ANXIETY AND DEPRESSION: ICD-10-CM

## 2024-12-23 DIAGNOSIS — F41.9 ANXIETY AND DEPRESSION: ICD-10-CM

## 2024-12-23 DIAGNOSIS — R73.9 HYPERGLYCEMIA: ICD-10-CM

## 2024-12-23 LAB — GLUCOSE BLD-MCNC: 147 MG/DL (ref 65–99)

## 2024-12-23 PROCEDURE — 82962 GLUCOSE BLOOD TEST: CPT | Performed by: FAMILY MEDICINE

## 2024-12-23 PROCEDURE — 3074F SYST BP LT 130 MM HG: CPT | Performed by: FAMILY MEDICINE

## 2024-12-23 PROCEDURE — 3078F DIAST BP <80 MM HG: CPT | Performed by: FAMILY MEDICINE

## 2024-12-23 PROCEDURE — 99214 OFFICE O/P EST MOD 30 MIN: CPT | Performed by: FAMILY MEDICINE

## 2024-12-23 RX ORDER — METFORMIN HYDROCHLORIDE 500 MG/1
500 TABLET, EXTENDED RELEASE ORAL DAILY
Qty: 90 TABLET | Refills: 0 | Status: SHIPPED | OUTPATIENT
Start: 2024-12-23 | End: 2025-03-23

## 2024-12-23 NOTE — PROGRESS NOTES
Subjective:     CC:   Chief Complaint   Patient presents with    Follow-Up    Lab Results       HPI:   Olinda presents today for follow-up.  Patient was seen at urgent care last week and placed on antibiotics and a Medrol Dosepak.  Patient was coughing up yellow to green material.  Patient's chest x-ray was within normal limits.  Patient states she is feeling better now.  Patient wanted to get a fingerstick done to see how she is doing on the metformin.  Patient also stopped taking the sertraline due to fact that she feels like she is doing well and not having any issues with anxiety or depression    Past Medical History:   Diagnosis Date    Labor abnormal 4/13/2017       Social History     Tobacco Use    Smoking status: Never    Smokeless tobacco: Never   Vaping Use    Vaping status: Never Used   Substance Use Topics    Alcohol use: No    Drug use: No       Current Outpatient Medications Ordered in Epic   Medication Sig Dispense Refill    promethazine-dextromethorphan (PROMETHAZINE-DM) 6.25-15 MG/5ML syrup Take 5 mL by mouth every four hours as needed for Cough. 120 mL 0    albuterol 108 (90 Base) MCG/ACT Aero Soln inhalation aerosol Inhale 2 Puffs every 6 hours as needed for Shortness of Breath (or cough). 8.5 g 0    amoxicillin-clavulanate (AUGMENTIN) 875-125 MG Tab Take 1 Tablet by mouth 2 times a day for 7 days. 14 Tablet 0    allopurinol (ZYLOPRIM) 100 MG Tab Take 1 Tablet by mouth every day. 90 Tablet 0    lovastatin (MEVACOR) 10 MG tablet Take 1 Tablet by mouth every evening. 90 Tablet 1    metFORMIN ER (GLUCOPHAGE XR) 500 MG TABLET SR 24 HR Take 1 Tablet by mouth every day for 90 days. 90 Tablet 0    methylPREDNISolone (MEDROL DOSEPAK) 4 MG Tablet Therapy Pack Follow schedule on package instructions. (Patient not taking: Reported on 12/23/2024) 21 Tablet 0    sertraline (ZOLOFT) 50 MG Tab 1/2 tablet p.o. daily for the first 7 days then increase to 1 tablet p.o. daily (Patient not taking: Reported on  "12/9/2024) 30 Tablet 0     No current Epic-ordered facility-administered medications on file.       Allergies:  Cefdinir    Health Maintenance: Completed    ROS:  Gen: no fevers/chills, patient has been losing weight  Eyes: no changes in vision  ENT: no sore throat, no hearing loss, no bloody nose  Pulm: no sob, no cough  CV: no chest pain, no palpitations  GI: no nausea/vomiting, no diarrhea  : no dysuria  Neuro: no headaches, no numbness/tingling  Heme/Lymph: no easy bruising    Objective:     Exam:  /68 (BP Location: Right arm, Patient Position: Sitting, BP Cuff Size: Large adult)   Pulse 100   Temp 36.8 °C (98.2 °F) (Temporal)   Resp 16   Ht 1.6 m (5' 3\")   Wt 84.7 kg (186 lb 11.2 oz)   SpO2 100%   BMI 33.07 kg/m²  Body mass index is 33.07 kg/m².    Gen: Alert and oriented, No apparent distress.  Skin: Warm and dry.  No obvious lesions.  Eyes: Sclera wnl Pupils normal in size  Lungs: Normal effort, CTA bilaterally, no wheezes, rhonchi, or rales  CV: Regular rate and rhythm. No murmurs, rubs, or gallops.  Musculoskeletal: Normal gait. No extremity cyanosis, clubbing, or edema.  Neuro: Oriented to person, place and time  Psych: Mood is wnl       Assessment & Plan:     39 y.o. female with the following -     1. Hyperglycemia  Patient's fingerstick was 147 but patient has recently took steroids which can increase her blood sugar plus she was ill and placed on antibiotics.  Would recommend seeing him back in mid January at that time we will do a hemoglobin A1c and a fasting blood sugar we will continue present medication at this time patient very agreeable with the plan.  I would recommend patient continue taking her Flonase nasal spray 2 sprays into each nostril once a day due to fact it looks like she may have some congestion and drainage issues I should see her back if she has worsening problems  - POCT Glucose  - Comp Metabolic Panel; Future  - HEMOGLOBIN A1C; Future    2. Elevated blood pressure " reading  Patient's blood pressure is within normal limits we will continue to monitor this.    3. Anxiety and depression  Patient is not feeling any anxiety or no depression at this time she is off her sertraline       Return in about 3 weeks (around 1/13/2025), or if symptoms worsen or fail to improve.    Please note that this dictation was created using voice recognition software. I have made every reasonable attempt to correct obvious errors, but I expect that there are errors of grammar and possibly content that I did not discover before finalizing the note.

## 2025-01-02 NOTE — CARE PLAN
Problem: Altered physiologic condition related to immediate post-delivery state and potential for bleeding/hemorrhage  Goal: Patient physiologically stable as evidenced by normal lochia, palpable uterine involution and vital signs within normal limits  Outcome: PROGRESSING AS EXPECTED  VSS. Fundus firm. Lochia light.    Problem: Alteration in comfort related to episiotomy, vaginal repair and/or after birth pains  Goal: Patient verbalizes acceptable pain level  Outcome: PROGRESSING AS EXPECTED  Pt pain managed with PRN pain meds per MAR.         Bed in lowest position, wheels locked, appropriate side rails in place/Call bell, personal items and telephone in reach/Instruct patient to call for assistance before getting out of bed or chair/Non-slip footwear when patient is out of bed/[place] to call system/Physically safe environment - no spills, clutter or unnecessary equipment/Purposeful Proactive Rounding/Room/bathroom lighting operational, light cord in reach

## 2025-01-16 ENCOUNTER — HOSPITAL ENCOUNTER (OUTPATIENT)
Dept: LAB | Facility: MEDICAL CENTER | Age: 40
End: 2025-01-16
Attending: FAMILY MEDICINE
Payer: COMMERCIAL

## 2025-01-16 DIAGNOSIS — R73.9 HYPERGLYCEMIA: ICD-10-CM

## 2025-01-16 LAB
ALBUMIN SERPL BCP-MCNC: 4.5 G/DL (ref 3.2–4.9)
ALBUMIN/GLOB SERPL: 1.6 G/DL
ALP SERPL-CCNC: 126 U/L (ref 30–99)
ALT SERPL-CCNC: 25 U/L (ref 2–50)
ANION GAP SERPL CALC-SCNC: 12 MMOL/L (ref 7–16)
AST SERPL-CCNC: 25 U/L (ref 12–45)
BILIRUB SERPL-MCNC: 0.6 MG/DL (ref 0.1–1.5)
BUN SERPL-MCNC: 13 MG/DL (ref 8–22)
CALCIUM ALBUM COR SERPL-MCNC: 8.8 MG/DL (ref 8.5–10.5)
CALCIUM SERPL-MCNC: 9.2 MG/DL (ref 8.5–10.5)
CHLORIDE SERPL-SCNC: 105 MMOL/L (ref 96–112)
CO2 SERPL-SCNC: 19 MMOL/L (ref 20–33)
CREAT SERPL-MCNC: 0.6 MG/DL (ref 0.5–1.4)
EST. AVERAGE GLUCOSE BLD GHB EST-MCNC: 163 MG/DL
GFR SERPLBLD CREATININE-BSD FMLA CKD-EPI: 118 ML/MIN/1.73 M 2
GLOBULIN SER CALC-MCNC: 2.9 G/DL (ref 1.9–3.5)
GLUCOSE SERPL-MCNC: 137 MG/DL (ref 65–99)
HBA1C MFR BLD: 7.3 % (ref 4–5.6)
POTASSIUM SERPL-SCNC: 4.1 MMOL/L (ref 3.6–5.5)
PROT SERPL-MCNC: 7.4 G/DL (ref 6–8.2)
SODIUM SERPL-SCNC: 136 MMOL/L (ref 135–145)

## 2025-01-16 PROCEDURE — 80053 COMPREHEN METABOLIC PANEL: CPT

## 2025-01-16 PROCEDURE — 83036 HEMOGLOBIN GLYCOSYLATED A1C: CPT

## 2025-01-16 PROCEDURE — 36415 COLL VENOUS BLD VENIPUNCTURE: CPT

## 2025-01-16 RX ORDER — FLUCONAZOLE 150 MG/1
TABLET ORAL
COMMUNITY
Start: 2024-12-26 | End: 2025-01-17

## 2025-01-17 ENCOUNTER — OFFICE VISIT (OUTPATIENT)
Dept: MEDICAL GROUP | Facility: PHYSICIAN GROUP | Age: 40
End: 2025-01-17
Payer: COMMERCIAL

## 2025-01-17 VITALS
HEART RATE: 100 BPM | SYSTOLIC BLOOD PRESSURE: 122 MMHG | OXYGEN SATURATION: 97 % | WEIGHT: 183.4 LBS | DIASTOLIC BLOOD PRESSURE: 78 MMHG | TEMPERATURE: 97.7 F | RESPIRATION RATE: 12 BRPM | HEIGHT: 63 IN | BODY MASS INDEX: 32.5 KG/M2

## 2025-01-17 DIAGNOSIS — R73.9 HYPERGLYCEMIA: ICD-10-CM

## 2025-01-17 DIAGNOSIS — R63.5 WEIGHT GAIN: ICD-10-CM

## 2025-01-17 PROCEDURE — 3074F SYST BP LT 130 MM HG: CPT | Performed by: FAMILY MEDICINE

## 2025-01-17 PROCEDURE — 3078F DIAST BP <80 MM HG: CPT | Performed by: FAMILY MEDICINE

## 2025-01-17 PROCEDURE — 99214 OFFICE O/P EST MOD 30 MIN: CPT | Performed by: FAMILY MEDICINE

## 2025-01-17 RX ORDER — METFORMIN HYDROCHLORIDE 500 MG/1
500 TABLET, EXTENDED RELEASE ORAL 2 TIMES DAILY
Qty: 180 TABLET | Refills: 0 | Status: SHIPPED | OUTPATIENT
Start: 2025-01-17 | End: 2025-04-17

## 2025-01-17 ASSESSMENT — PATIENT HEALTH QUESTIONNAIRE - PHQ9: CLINICAL INTERPRETATION OF PHQ2 SCORE: 0

## 2025-01-18 NOTE — PROGRESS NOTES
"Subjective:     CC:   Chief Complaint   Patient presents with    Lab Follow-up       HPI:   Olinda presents today follow-up of her labs.  Patient feels she is doing well patient has been off the sertraline and feels like she is not having any issues with anxiety.    Past Medical History:   Diagnosis Date    Labor abnormal 4/13/2017       Social History     Tobacco Use    Smoking status: Never    Smokeless tobacco: Never   Vaping Use    Vaping status: Never Used   Substance Use Topics    Alcohol use: No    Drug use: No       Current Outpatient Medications Ordered in Epic   Medication Sig Dispense Refill    metFORMIN ER (GLUCOPHAGE XR) 500 MG TABLET SR 24 HR Take 1 Tablet by mouth 2 times a day for 90 days. 180 Tablet 0    albuterol 108 (90 Base) MCG/ACT Aero Soln inhalation aerosol Inhale 2 Puffs every 6 hours as needed for Shortness of Breath (or cough). 8.5 g 0    allopurinol (ZYLOPRIM) 100 MG Tab Take 1 Tablet by mouth every day. 90 Tablet 0    lovastatin (MEVACOR) 10 MG tablet Take 1 Tablet by mouth every evening. 90 Tablet 1     No current Epic-ordered facility-administered medications on file.       Allergies:  Cefdinir    Health Maintenance: Completed    ROS:  Gen: no fevers/chills, patient has lost 3 pounds since she is last been seen  Eyes: no changes in vision  ENT: no sore throat, no hearing loss, no bloody nose  Pulm: no sob, no cough  CV: no chest pain, no palpitations  GI: no nausea/vomiting, no diarrhea  : no dysuria  Neuro: no headaches, no numbness/tingling  Heme/Lymph: no easy bruising    Objective:     Exam:  /78   Pulse 100   Temp 36.5 °C (97.7 °F) (Temporal)   Resp 12   Ht 1.6 m (5' 3\")   Wt 83.2 kg (183 lb 6.4 oz)   LMP 12/25/2024   SpO2 97%   BMI 32.49 kg/m²  Body mass index is 32.49 kg/m².    Gen: Alert and oriented, No apparent distress.  Skin: Warm and dry.  No obvious lesions.  Eyes: Sclera wnl Pupils normal in size  Lungs: Normal effort, CTA bilaterally, no wheezes, " rhonchi, or rales  CV: Regular rate and rhythm. No murmurs, rubs, or gallops.  Musculoskeletal: Normal gait. No extremity cyanosis, clubbing, or edema.  Neuro: Oriented to person, place and time  Psych: Mood is wnl       Assessment & Plan:     39 y.o. female with the following -     1. Hyperglycemia  Patient's blood sugar was 137 her hemoglobin A1c is 7.3.  I would like to increase her metformin to 1 tablet twice a day patient very agreeable with the plan.  Recommend seeing her back in February early in the morning so we can do a fasting glucose on her at that time    2. Weight gain    Other orders  - metFORMIN ER (GLUCOPHAGE XR) 500 MG TABLET SR 24 HR; Take 1 Tablet by mouth 2 times a day for 90 days.  Dispense: 180 Tablet; Refill: 0       Return in about 6 weeks (around 2/28/2025), or if symptoms worsen or fail to improve.    Please note that this dictation was created using voice recognition software. I have made every reasonable attempt to correct obvious errors, but I expect that there are errors of grammar and possibly content that I did not discover before finalizing the note.

## 2025-02-24 RX ORDER — ALLOPURINOL 100 MG/1
100 TABLET ORAL DAILY
Qty: 90 TABLET | Refills: 0 | Status: SHIPPED | OUTPATIENT
Start: 2025-02-24

## 2025-02-24 NOTE — TELEPHONE ENCOUNTER
Received request via: Pharmacy    Was the patient seen in the last year in this department? Yes    Does the patient have an active prescription (recently filled or refills available) for medication(s) requested? No      Does the patient have [facility] Plus and need 100-day supply? (This applies to ALL medications) Patient does not have SCP

## 2025-02-27 ENCOUNTER — RESULTS FOLLOW-UP (OUTPATIENT)
Dept: URGENT CARE | Facility: PHYSICIAN GROUP | Age: 40
End: 2025-02-27

## 2025-02-27 ENCOUNTER — OFFICE VISIT (OUTPATIENT)
Dept: URGENT CARE | Facility: PHYSICIAN GROUP | Age: 40
End: 2025-02-27
Payer: COMMERCIAL

## 2025-02-27 VITALS
HEIGHT: 62 IN | DIASTOLIC BLOOD PRESSURE: 76 MMHG | OXYGEN SATURATION: 96 % | WEIGHT: 186.29 LBS | SYSTOLIC BLOOD PRESSURE: 130 MMHG | HEART RATE: 108 BPM | BODY MASS INDEX: 34.28 KG/M2 | RESPIRATION RATE: 20 BRPM | TEMPERATURE: 98.2 F

## 2025-02-27 DIAGNOSIS — J02.9 SORE THROAT: ICD-10-CM

## 2025-02-27 DIAGNOSIS — J01.10 ACUTE NON-RECURRENT FRONTAL SINUSITIS: ICD-10-CM

## 2025-02-27 RX ORDER — PREDNISONE 10 MG/1
20 TABLET ORAL DAILY
Qty: 10 TABLET | Refills: 0 | Status: SHIPPED | OUTPATIENT
Start: 2025-02-27 | End: 2025-03-04

## 2025-02-27 RX ORDER — AMOXICILLIN 500 MG/1
500 CAPSULE ORAL 3 TIMES DAILY
Qty: 21 CAPSULE | Refills: 0 | Status: SHIPPED | OUTPATIENT
Start: 2025-02-27 | End: 2025-03-06

## 2025-02-27 RX ORDER — AZELASTINE HYDROCHLORIDE, FLUTICASONE PROPIONATE 137; 50 UG/1; UG/1
SPRAY, METERED NASAL
Qty: 23 G | Refills: 0 | Status: SHIPPED | OUTPATIENT
Start: 2025-02-27

## 2025-02-27 ASSESSMENT — ENCOUNTER SYMPTOMS
COUGH: 1
DIARRHEA: 0
WHEEZING: 0
SORE THROAT: 1
HEADACHES: 1
VOMITING: 0

## 2025-02-27 NOTE — LETTER
[facility] [address] [address]     February 27, 2025    Patient: Olinda Trevino   YOB: 1985   Date of Visit: 2/27/2025       To Whom It May Concern:    Olinda Trevino was seen and treated in our department on 2/27/2025. It's my medical opinion that this patient would benefit from rest and recuperation for 4 days.   May return to work/school/ on 3/3/2025, or sooner if feeling better.      Sincerely,     LUCERO Perea.

## 2025-02-28 NOTE — PROGRESS NOTES
"Patient/parent/guardian has consented to treatment and for use of patient information for treatment and billing purposes.  Subjective:   Olinda Trevino  is a 39 y.o. female who presents for Sore Throat (Congestion x 1 week  ,sore throat ,ear pressure and pain x 2 days)       URI   This is a new problem. The current episode started in the past 7 days. The problem has been gradually worsening. There has been no fever. Associated symptoms include congestion, coughing, ear pain, headaches, a plugged ear sensation and a sore throat. Pertinent negatives include no diarrhea, vomiting or wheezing. She has tried nothing for the symptoms.       Review of Systems   HENT:  Positive for congestion, ear pain and sore throat.    Respiratory:  Positive for cough. Negative for wheezing.    Gastrointestinal:  Negative for diarrhea and vomiting.   Neurological:  Positive for headaches.         CURRENT MEDICATIONS:  albuterol Aers  allopurinol Tabs  lovastatin  metFORMIN ER Tb24  Allergies:     Allergies   Allergen Reactions    Cefdinir Rash     Hands and feet broke out in rash, took benadryl      Current Problems: Olinda Trevino does not have any pertinent problems on file.  Past Surgical Hx:  No past surgical history on file.   Past Social Hx:  reports that she has never smoked. She has never used smokeless tobacco. She reports that she does not drink alcohol and does not use drugs.    Objective:   /76   Pulse (!) 108   Temp 36.8 °C (98.2 °F) (Temporal)   Resp 20   Ht 1.575 m (5' 2\")   Wt 84.5 kg (186 lb 4.6 oz)   SpO2 96%   BMI 34.07 kg/m²   Physical Exam  Vitals and nursing note reviewed.   Constitutional:       General: She is not in acute distress.     Appearance: Normal appearance. She is normal weight. She is ill-appearing. She is not toxic-appearing or diaphoretic.   HENT:      Head: Normocephalic and atraumatic.      Right Ear: External ear normal. Tenderness present. No swelling. A middle " ear effusion is present. Tympanic membrane is bulging. Tympanic membrane is not erythematous or retracted.      Left Ear: External ear normal. Tenderness present. No swelling. A middle ear effusion is present. Tympanic membrane is bulging. Tympanic membrane is not erythematous or retracted.      Nose: Mucosal edema, congestion and rhinorrhea present. Rhinorrhea is clear.      Right Sinus: No maxillary sinus tenderness or frontal sinus tenderness.      Left Sinus: No maxillary sinus tenderness or frontal sinus tenderness.      Mouth/Throat:      Mouth: Mucous membranes are moist.      Pharynx: Postnasal drip present. No oropharyngeal exudate or posterior oropharyngeal erythema.   Eyes:      Extraocular Movements: Extraocular movements intact.      Conjunctiva/sclera: Conjunctivae normal.      Pupils: Pupils are equal, round, and reactive to light.   Cardiovascular:      Rate and Rhythm: Regular rhythm. Tachycardia present.      Pulses: Normal pulses.      Heart sounds: Normal heart sounds, S1 normal and S2 normal.   Pulmonary:      Effort: Pulmonary effort is normal.      Breath sounds: Normal breath sounds. No decreased breath sounds, wheezing, rhonchi or rales.   Abdominal:      Palpations: Abdomen is soft.      Tenderness: There is no abdominal tenderness.   Musculoskeletal:         General: Normal range of motion.      Cervical back: Normal range of motion and neck supple.   Skin:     General: Skin is warm and dry.      Findings: No rash.   Neurological:      General: No focal deficit present.      Mental Status: She is alert and oriented to person, place, and time.   Psychiatric:         Mood and Affect: Mood normal.         Behavior: Behavior normal.       Results for orders placed or performed in visit on 02/27/25   POCT CEPHEID COV-2, FLU A/B, RSV - PCR    Collection Time: 02/27/25  5:56 PM   Result Value Ref Range    SARS-CoV-2 by PCR Negative Negative, Invalid    Influenza virus A RNA Negative Negative,  Invalid    Influenza virus B, PCR Negative Negative, Invalid    RSV, PCR Negative Negative, Invalid         Assessment/Plan:   1. Sore throat  - POCT CEPHEID COV-2, FLU A/B, RSV - PCR    2. Acute non-recurrent frontal sinusitis  - Azelastine-Fluticasone 137-50 MCG/ACT Suspension; One spray per nostril twice daily as needed for congestion.  Dispense: 23 g; Refill: 0  - predniSONE (DELTASONE) 10 MG Tab; Take 2 Tablets by mouth every day for 5 days.  Dispense: 10 Tablet; Refill: 0  - amoxicillin (AMOXIL) 500 MG Cap; Take 1 Capsule by mouth 3 times a day for 7 days.  Dispense: 21 Capsule; Refill: 0    Point-of-care viral testing ordered.  Will contact patient with any positive results.  Suspect this was originally a viral upper respiratory infection with postnasal drip causing the sore throat.  She has had sinus symptoms approximately 7 to 10 days, with a history of sinusitis.  I do feel that with her length of time having symptoms if the point-of-care is negative I will send amoxicillin to the pharmacy.  Recommend nondrowsy antihistamine, nasal saline, and Flonase for symptom management. Red flags, RTC and ER precautions discussed, with patient confirming their understanding of  need for immediate follow-up.  Discussed medication management options, risks and benefits, and alternatives to treatment plan agreed upon. Instructed to continue  medications without changes as ordered by primary care unless aforementioned above.  Patient expresses understanding and agrees to plan of care. All questions or concerns answered. For my MDM, I have personally reviewed previous notes, and test results as pertinent to today's visit.  5:58 PM  Point-of-care viral testing negative.  Message sent through [facility].  Please note that this dictation was created using voice recognition software. I have made every reasonable attempt to correct obvious errors,  but there may be grammar errors, and possibly content that I did not discover  before finalizing the note.   This note was electronically signed by DARREL Perea

## 2025-03-03 ENCOUNTER — APPOINTMENT (OUTPATIENT)
Dept: MEDICAL GROUP | Facility: PHYSICIAN GROUP | Age: 40
End: 2025-03-03
Payer: COMMERCIAL

## 2025-03-21 ENCOUNTER — OFFICE VISIT (OUTPATIENT)
Dept: URGENT CARE | Facility: PHYSICIAN GROUP | Age: 40
End: 2025-03-21
Payer: COMMERCIAL

## 2025-03-21 VITALS
RESPIRATION RATE: 20 BRPM | TEMPERATURE: 98.6 F | SYSTOLIC BLOOD PRESSURE: 132 MMHG | WEIGHT: 190.7 LBS | HEIGHT: 62 IN | DIASTOLIC BLOOD PRESSURE: 84 MMHG | HEART RATE: 99 BPM | OXYGEN SATURATION: 98 % | BODY MASS INDEX: 35.09 KG/M2

## 2025-03-21 DIAGNOSIS — H66.001 NON-RECURRENT ACUTE SUPPURATIVE OTITIS MEDIA OF RIGHT EAR WITHOUT SPONTANEOUS RUPTURE OF TYMPANIC MEMBRANE: ICD-10-CM

## 2025-03-21 PROCEDURE — 3079F DIAST BP 80-89 MM HG: CPT | Performed by: PHYSICIAN ASSISTANT

## 2025-03-21 PROCEDURE — 99213 OFFICE O/P EST LOW 20 MIN: CPT | Performed by: PHYSICIAN ASSISTANT

## 2025-03-21 PROCEDURE — 3075F SYST BP GE 130 - 139MM HG: CPT | Performed by: PHYSICIAN ASSISTANT

## 2025-03-21 ASSESSMENT — ENCOUNTER SYMPTOMS
DIARRHEA: 0
HEADACHES: 1
CHILLS: 0
SINUS PAIN: 1
NAUSEA: 0
ABDOMINAL PAIN: 0
VOMITING: 0
COUGH: 0
FEVER: 0

## 2025-03-22 NOTE — PROGRESS NOTES
Subjective     Olinda Trevino is a 39 y.o. female who presents with Sinus Problem (Sinus and head congestion, ear pain ,yellow green mucus x 1 week )    HPI:  Olinda Trevino is a 39 y.o. female who presents today for evaluation of URI symptoms and ear pain.  Patient reports that she started to get sick about 1 week ago with URI symptoms.  Her son was sick first and he is actually being treated right now for double ear infection.  She reports that she has had worsening ear discomfort, muffled hearing, sinus discomfort with green/yellow mucus.  No significant cough.  No fever.  Has been using nasal sprays and over-the-counter medications without any improvement.        Review of Systems   Constitutional:  Positive for malaise/fatigue. Negative for chills and fever.   HENT:  Positive for congestion, ear pain and sinus pain.    Respiratory:  Negative for cough.    Gastrointestinal:  Negative for abdominal pain, diarrhea, nausea and vomiting.   Neurological:  Positive for headaches.             PMH:  has a past medical history of Labor abnormal (4/13/2017).  MEDS:   Current Outpatient Medications:     amoxicillin-clavulanate (AUGMENTIN) 875-125 MG Tab, Take 1 Tablet by mouth 2 times a day for 7 days., Disp: 14 Tablet, Rfl: 0    Azelastine-Fluticasone 137-50 MCG/ACT Suspension, One spray per nostril twice daily as needed for congestion., Disp: 23 g, Rfl: 0    allopurinol (ZYLOPRIM) 100 MG Tab, Take 1 Tablet by mouth every day., Disp: 90 Tablet, Rfl: 0    metFORMIN ER (GLUCOPHAGE XR) 500 MG TABLET SR 24 HR, Take 1 Tablet by mouth 2 times a day for 90 days., Disp: 180 Tablet, Rfl: 0    lovastatin (MEVACOR) 10 MG tablet, Take 1 Tablet by mouth every evening., Disp: 90 Tablet, Rfl: 1    albuterol 108 (90 Base) MCG/ACT Aero Soln inhalation aerosol, Inhale 2 Puffs every 6 hours as needed for Shortness of Breath (or cough)., Disp: 8.5 g, Rfl: 0  ALLERGIES:   Allergies   Allergen Reactions    Cefdinir Rash  "    Hands and feet broke out in rash, took benadryl      SURGHX: History reviewed. No pertinent surgical history.  SOCHX:  reports that she has never smoked. She has never used smokeless tobacco. She reports that she does not drink alcohol and does not use drugs.  FH: Family history was reviewed, no pertinent findings to report      Objective     /84   Pulse 99   Temp 37 °C (98.6 °F) (Temporal)   Resp 20   Ht 1.575 m (5' 2\")   Wt 86.5 kg (190 lb 11.2 oz)   SpO2 98%   BMI 34.88 kg/m²      Physical Exam  Constitutional:       Appearance: She is well-developed.   HENT:      Head: Normocephalic and atraumatic.      Right Ear: Ear canal and external ear normal. Tympanic membrane is erythematous.      Left Ear: Tympanic membrane, ear canal and external ear normal.      Nose: Mucosal edema and congestion present. No rhinorrhea.      Mouth/Throat:      Lips: Pink.      Mouth: Mucous membranes are moist.      Pharynx: Oropharynx is clear.   Eyes:      Conjunctiva/sclera: Conjunctivae normal.      Pupils: Pupils are equal, round, and reactive to light.   Cardiovascular:      Rate and Rhythm: Normal rate and regular rhythm.      Heart sounds: Normal heart sounds. No murmur heard.  Pulmonary:      Effort: Pulmonary effort is normal.      Breath sounds: Normal breath sounds. No wheezing.   Musculoskeletal:      Cervical back: Normal range of motion.   Lymphadenopathy:      Cervical: No cervical adenopathy.   Skin:     General: Skin is warm and dry.      Capillary Refill: Capillary refill takes less than 2 seconds.   Neurological:      Mental Status: She is alert and oriented to person, place, and time.   Psychiatric:         Behavior: Behavior normal.         Judgment: Judgment normal.             Assessment & Plan     1. Non-recurrent acute suppurative otitis media of right ear without spontaneous rupture of tympanic membrane  - amoxicillin-clavulanate (AUGMENTIN) 875-125 MG Tab; Take 1 Tablet by mouth 2 times a " day for 7 days.  Dispense: 14 Tablet; Refill: 0  Patient noted to have a right AOM on today's exam.  She may also have a bacterial sinusitis.  We will treat with a 7-day course of Augmentin.  She may also continue to use over-the-counter decongestants as well as her nasal sprays.  Also discussed supportive care measures to include the use of saline nasal rinses, steam inhalation, use of a cool-mist humidifier in the bedroom at night, increased fluid intake.          Differential Diagnosis, natural history, and supportive care discussed. Return to the Urgent Care or follow up with your PCP if symptoms fail to resolve, or for any new or worsening symptoms. Emergency room precautions discussed. Patient and/or family appears understanding of information.

## 2025-03-27 ENCOUNTER — OFFICE VISIT (OUTPATIENT)
Dept: MEDICAL GROUP | Facility: PHYSICIAN GROUP | Age: 40
End: 2025-03-27
Payer: COMMERCIAL

## 2025-03-27 VITALS
BODY MASS INDEX: 34.27 KG/M2 | DIASTOLIC BLOOD PRESSURE: 80 MMHG | WEIGHT: 186.2 LBS | TEMPERATURE: 98.6 F | SYSTOLIC BLOOD PRESSURE: 124 MMHG | RESPIRATION RATE: 16 BRPM | HEIGHT: 62 IN | HEART RATE: 99 BPM | OXYGEN SATURATION: 98 %

## 2025-03-27 DIAGNOSIS — Z00.00 WELLNESS EXAMINATION: ICD-10-CM

## 2025-03-27 DIAGNOSIS — E55.9 VITAMIN D DEFICIENCY: ICD-10-CM

## 2025-03-27 DIAGNOSIS — J32.9 CHRONIC SINUSITIS, UNSPECIFIED LOCATION: ICD-10-CM

## 2025-03-27 DIAGNOSIS — E11.9 TYPE 2 DIABETES MELLITUS WITHOUT COMPLICATION, WITHOUT LONG-TERM CURRENT USE OF INSULIN (HCC): ICD-10-CM

## 2025-03-27 DIAGNOSIS — E78.2 ELEVATED TRIGLYCERIDES WITH HIGH CHOLESTEROL: ICD-10-CM

## 2025-03-27 DIAGNOSIS — Z11.59 NEED FOR HEPATITIS C SCREENING TEST: ICD-10-CM

## 2025-03-27 LAB — GLUCOSE BLD-MCNC: 132 MG/DL (ref 65–99)

## 2025-03-27 PROCEDURE — 3079F DIAST BP 80-89 MM HG: CPT | Performed by: FAMILY MEDICINE

## 2025-03-27 PROCEDURE — 99214 OFFICE O/P EST MOD 30 MIN: CPT | Performed by: FAMILY MEDICINE

## 2025-03-27 PROCEDURE — 3074F SYST BP LT 130 MM HG: CPT | Performed by: FAMILY MEDICINE

## 2025-03-27 PROCEDURE — 82962 GLUCOSE BLOOD TEST: CPT | Performed by: FAMILY MEDICINE

## 2025-03-27 RX ORDER — MONTELUKAST SODIUM 10 MG/1
10 TABLET ORAL DAILY
Qty: 90 TABLET | Refills: 1 | Status: SHIPPED | OUTPATIENT
Start: 2025-03-27 | End: 2025-03-27

## 2025-03-27 RX ORDER — MONTELUKAST SODIUM 10 MG/1
10 TABLET ORAL DAILY
Qty: 90 TABLET | Refills: 0 | Status: SHIPPED | OUTPATIENT
Start: 2025-03-27

## 2025-03-27 RX ORDER — FLUCONAZOLE 150 MG/1
TABLET ORAL
COMMUNITY
Start: 2025-03-18

## 2025-03-27 RX ORDER — METFORMIN HYDROCHLORIDE 500 MG/1
TABLET, EXTENDED RELEASE ORAL
Qty: 270 TABLET | Refills: 1 | Status: SHIPPED | OUTPATIENT
Start: 2025-03-27

## 2025-03-27 NOTE — PROGRESS NOTES
Subjective:     CC:   Chief Complaint   Patient presents with    Follow-Up       HPI:   Olinda presents today for follow-up.  Patient is doing well on metformin she feels the metformin helps decrease her appetite.  Patient is not due yet for hemoglobin A1c will go ahead and do a fasting glucose since she still been fasting.  Patient also would like a referral to ear nose and throat she has been seen multiple times at urgent care for sinusitis and would like to be evaluated since she continues to have sinus infections despite treatment.    Past Medical History:   Diagnosis Date    Labor abnormal 4/13/2017       Social History     Tobacco Use    Smoking status: Never    Smokeless tobacco: Never   Vaping Use    Vaping status: Never Used   Substance Use Topics    Alcohol use: No    Drug use: No       Current Outpatient Medications Ordered in Epic   Medication Sig Dispense Refill    fluconazole (DIFLUCAN) 150 MG tablet TAKE 1 TABLET BY MOUTH DAILY FOR 2 DAYS      metFORMIN ER (GLUCOPHAGE XR) 500 MG TABLET SR 24 HR 2 tablets in the morning and 1 tablet in the evening 270 Tablet 1    amoxicillin-clavulanate (AUGMENTIN) 875-125 MG Tab Take 1 Tablet by mouth 2 times a day for 7 days. 14 Tablet 0    Azelastine-Fluticasone 137-50 MCG/ACT Suspension One spray per nostril twice daily as needed for congestion. 23 g 0    allopurinol (ZYLOPRIM) 100 MG Tab Take 1 Tablet by mouth every day. 90 Tablet 0    lovastatin (MEVACOR) 10 MG tablet Take 1 Tablet by mouth every evening. 90 Tablet 1     No current Epic-ordered facility-administered medications on file.       Allergies:  Cefdinir    Health Maintenance: Completed    ROS:  Gen: no fevers/chills, no changes in weight  Eyes: no changes in vision  ENT: no sore throat, no hearing loss, no bloody nose  Pulm: no sob, no cough  CV: no chest pain, no palpitations  GI: no nausea/vomiting, no diarrhea  : no dysuria  Neuro: no headaches, no numbness/tingling  Heme/Lymph: no easy  "bruising    Objective:     Exam:  /80 (BP Location: Right arm, Patient Position: Sitting, BP Cuff Size: Adult)   Pulse 99   Temp 37 °C (98.6 °F)   Resp 16   Ht 1.575 m (5' 2\")   Wt 84.5 kg (186 lb 3.2 oz)   LMP 03/24/2025   SpO2 98%   BMI 34.06 kg/m²  Body mass index is 34.06 kg/m².    Gen: Alert and oriented, No apparent distress.  Skin: Warm and dry.  No obvious lesions.  Eyes: Sclera wnl Pupils normal in size  ENT: Canals wnl and TM are not red, patient was told last time she had a right ear infection on looking at her eardrum it is not red there is clear fluid behind both of her TMs.  Lungs: Normal effort, CTA bilaterally, no wheezes, rhonchi, or rales  CV: Regular rate and rhythm. No murmurs, rubs, or gallops.  Musculoskeletal: Normal gait. No extremity cyanosis, clubbing, or edema.  Neuro: Oriented to person, place and time  Psych: Mood is wnl     Labs: Fasting labs were ordered for next appointment    Assessment & Plan:     39 y.o. female with the following -     1. Type 2 diabetes mellitus without complication, without long-term current use of insulin ([facility])  Recommend increasing the metformin to 2 tablets in the morning and 1 tablet in the evening will repeat her hemoglobin A1c in late April. Finger stick done today fasting was 132 patient has been fasting for more than 12 hours.  - POCT Glucose  - Comp Metabolic Panel; Future  - HEMOGLOBIN A1C; Future    2. Elevated triglycerides with high cholesterol  Recommend checking her lipid panel  - Comp Metabolic Panel; Future  - Lipid Profile; Future    3. Vitamin D deficiency  Recommend checking her vitamin D  - VITAMIN D,25 HYDROXY (DEFICIENCY); Future    4. Wellness examination  - CBC WITH DIFFERENTIAL; Future    5. Chronic sinusitis, unspecified location  Patient appears to be feeling better will write a referral to ear nose and throat I suggest we try possibly montelukast if she is having more problems with chronic sinus drainage.  Patient was " agreeable with the plan  - Referral to ENT    Other orders    - metFORMIN ER (GLUCOPHAGE XR) 500 MG TABLET SR 24 HR; 2 tablets in the morning and 1 tablet in the evening  Dispense: 270 Tablet; Refill: 1       Return in about 4 weeks (around 4/24/2025), or if symptoms worsen or fail to improve.    Please note that this dictation was created using voice recognition software. I have made every reasonable attempt to correct obvious errors, but I expect that there are errors of grammar and possibly content that I did not discover before finalizing the note.

## 2025-03-28 NOTE — Clinical Note
REFERRAL APPROVAL NOTICE         Sent on March 28, 2025                   Olinda Trevino  [address] [place] [address]                   Dear Ms. Trevino,    After a careful review of the medical information and benefit coverage, [facility] has processed your referral. See below for additional details.    If applicable, you must be actively enrolled with your insurance for coverage of the authorized service. If you have any questions regarding your coverage, please contact your insurance directly.    REFERRAL INFORMATION   Referral #:  [id number]  Referred-To Provider    Referred-By Provider:  Otolaryngology    Mable Alexis M.D.   [facility]      [address]  [place] [address]  [phone] [address]  [place] [address]  [phone]    Referral Start Date:  03/27/2025  Referral End Date:   03/27/2026             SCHEDULING  If you do not already have an appointment, please call [phone] to make an appointment.     MORE INFORMATION  If you do not already have a [facility] account, sign up at: [email or web address]  You can access your medical information, make appointments, see lab results, billing information, and more.  If you have questions regarding this referral, please contact  the [facility] Referrals department at:             [phone]. Monday - Friday 8:00AM - 5:00PM.     Sincerely,    [facility]

## 2025-04-03 ENCOUNTER — APPOINTMENT (OUTPATIENT)
Dept: MEDICAL GROUP | Facility: PHYSICIAN GROUP | Age: 40
End: 2025-04-03
Payer: COMMERCIAL

## 2025-04-09 ENCOUNTER — APPOINTMENT (OUTPATIENT)
Dept: MEDICAL GROUP | Facility: PHYSICIAN GROUP | Age: 40
End: 2025-04-09
Payer: COMMERCIAL

## 2025-04-26 ENCOUNTER — HOSPITAL ENCOUNTER (OUTPATIENT)
Dept: LAB | Facility: MEDICAL CENTER | Age: 40
End: 2025-04-26
Attending: FAMILY MEDICINE
Payer: COMMERCIAL

## 2025-04-26 DIAGNOSIS — Z00.00 WELLNESS EXAMINATION: ICD-10-CM

## 2025-04-26 DIAGNOSIS — E11.9 TYPE 2 DIABETES MELLITUS WITHOUT COMPLICATION, WITHOUT LONG-TERM CURRENT USE OF INSULIN (HCC): ICD-10-CM

## 2025-04-26 DIAGNOSIS — E55.9 VITAMIN D DEFICIENCY: ICD-10-CM

## 2025-04-26 DIAGNOSIS — E78.2 ELEVATED TRIGLYCERIDES WITH HIGH CHOLESTEROL: ICD-10-CM

## 2025-04-26 DIAGNOSIS — Z11.59 NEED FOR HEPATITIS C SCREENING TEST: ICD-10-CM

## 2025-04-26 LAB
25(OH)D3 SERPL-MCNC: 31 NG/ML (ref 30–100)
ALBUMIN SERPL BCP-MCNC: 4.4 G/DL (ref 3.2–4.9)
ALBUMIN/GLOB SERPL: 1.3 G/DL
ALP SERPL-CCNC: 100 U/L (ref 30–99)
ALT SERPL-CCNC: 28 U/L (ref 2–50)
ANION GAP SERPL CALC-SCNC: 12 MMOL/L (ref 7–16)
AST SERPL-CCNC: 25 U/L (ref 12–45)
BASOPHILS # BLD AUTO: 0.9 % (ref 0–1.8)
BASOPHILS # BLD: 0.06 K/UL (ref 0–0.12)
BILIRUB SERPL-MCNC: 0.3 MG/DL (ref 0.1–1.5)
BUN SERPL-MCNC: 10 MG/DL (ref 8–22)
BURR CELLS BLD QL SMEAR: NORMAL
CALCIUM ALBUM COR SERPL-MCNC: 8.9 MG/DL (ref 8.5–10.5)
CALCIUM SERPL-MCNC: 9.2 MG/DL (ref 8.5–10.5)
CHLORIDE SERPL-SCNC: 107 MMOL/L (ref 96–112)
CHOLEST SERPL-MCNC: 171 MG/DL (ref 100–199)
CO2 SERPL-SCNC: 20 MMOL/L (ref 20–33)
CREAT SERPL-MCNC: 0.5 MG/DL (ref 0.5–1.4)
EOSINOPHIL # BLD AUTO: 0.17 K/UL (ref 0–0.51)
EOSINOPHIL NFR BLD: 2.6 % (ref 0–6.9)
ERYTHROCYTE [DISTWIDTH] IN BLOOD BY AUTOMATED COUNT: 44.1 FL (ref 35.9–50)
EST. AVERAGE GLUCOSE BLD GHB EST-MCNC: 180 MG/DL
GFR SERPLBLD CREATININE-BSD FMLA CKD-EPI: 123 ML/MIN/1.73 M 2
GLOBULIN SER CALC-MCNC: 3.3 G/DL (ref 1.9–3.5)
GLUCOSE SERPL-MCNC: 103 MG/DL (ref 65–99)
HBA1C MFR BLD: 7.9 % (ref 4–5.6)
HCT VFR BLD AUTO: 42.3 % (ref 37–47)
HCV AB SER QL: NORMAL
HDLC SERPL-MCNC: 39 MG/DL
HGB BLD-MCNC: 13.9 G/DL (ref 12–16)
LDLC SERPL CALC-MCNC: 104 MG/DL
LYMPHOCYTES # BLD AUTO: 2.15 K/UL (ref 1–4.8)
LYMPHOCYTES NFR BLD: 33 % (ref 22–41)
MANUAL DIFF BLD: NORMAL
MCH RBC QN AUTO: 28.5 PG (ref 27–33)
MCHC RBC AUTO-ENTMCNC: 32.9 G/DL (ref 32.2–35.5)
MCV RBC AUTO: 86.9 FL (ref 81.4–97.8)
MONOCYTES # BLD AUTO: 0.4 K/UL (ref 0–0.85)
MONOCYTES NFR BLD AUTO: 6.1 % (ref 0–13.4)
MORPHOLOGY BLD-IMP: NORMAL
NEUTROPHILS # BLD AUTO: 3.73 K/UL (ref 1.82–7.42)
NEUTROPHILS NFR BLD: 57.4 % (ref 44–72)
NRBC # BLD AUTO: 0 K/UL
NRBC BLD-RTO: 0 /100 WBC (ref 0–0.2)
PLATELET # BLD AUTO: 414 K/UL (ref 164–446)
PLATELET BLD QL SMEAR: NORMAL
PMV BLD AUTO: 9.1 FL (ref 9–12.9)
POIKILOCYTOSIS BLD QL SMEAR: NORMAL
POTASSIUM SERPL-SCNC: 4.1 MMOL/L (ref 3.6–5.5)
PROT SERPL-MCNC: 7.7 G/DL (ref 6–8.2)
RBC # BLD AUTO: 4.87 M/UL (ref 4.2–5.4)
RBC BLD AUTO: PRESENT
SODIUM SERPL-SCNC: 139 MMOL/L (ref 135–145)
TRIGL SERPL-MCNC: 142 MG/DL (ref 0–149)
WBC # BLD AUTO: 6.5 K/UL (ref 4.8–10.8)

## 2025-04-26 PROCEDURE — 80053 COMPREHEN METABOLIC PANEL: CPT

## 2025-04-26 PROCEDURE — 86803 HEPATITIS C AB TEST: CPT

## 2025-04-26 PROCEDURE — 85007 BL SMEAR W/DIFF WBC COUNT: CPT

## 2025-04-26 PROCEDURE — 36415 COLL VENOUS BLD VENIPUNCTURE: CPT

## 2025-04-26 PROCEDURE — 85027 COMPLETE CBC AUTOMATED: CPT

## 2025-04-26 PROCEDURE — 82306 VITAMIN D 25 HYDROXY: CPT

## 2025-04-26 PROCEDURE — 83036 HEMOGLOBIN GLYCOSYLATED A1C: CPT

## 2025-04-26 PROCEDURE — 80061 LIPID PANEL: CPT

## 2025-04-27 ENCOUNTER — RESULTS FOLLOW-UP (OUTPATIENT)
Dept: MEDICAL GROUP | Facility: PHYSICIAN GROUP | Age: 40
End: 2025-04-27

## 2025-05-02 ENCOUNTER — OFFICE VISIT (OUTPATIENT)
Dept: MEDICAL GROUP | Facility: PHYSICIAN GROUP | Age: 40
End: 2025-05-02
Payer: COMMERCIAL

## 2025-05-02 ENCOUNTER — HOSPITAL ENCOUNTER (OUTPATIENT)
Facility: MEDICAL CENTER | Age: 40
End: 2025-05-02
Attending: FAMILY MEDICINE
Payer: COMMERCIAL

## 2025-05-02 VITALS
WEIGHT: 187.2 LBS | SYSTOLIC BLOOD PRESSURE: 126 MMHG | BODY MASS INDEX: 34.45 KG/M2 | TEMPERATURE: 97.5 F | DIASTOLIC BLOOD PRESSURE: 78 MMHG | OXYGEN SATURATION: 97 % | HEIGHT: 62 IN | HEART RATE: 112 BPM | RESPIRATION RATE: 16 BRPM

## 2025-05-02 DIAGNOSIS — R19.5 LOOSE STOOLS: ICD-10-CM

## 2025-05-02 DIAGNOSIS — E11.9 TYPE 2 DIABETES MELLITUS WITHOUT COMPLICATION, WITHOUT LONG-TERM CURRENT USE OF INSULIN (HCC): ICD-10-CM

## 2025-05-02 LAB
CREAT UR-MCNC: 173 MG/DL
MICROALBUMIN UR-MCNC: 143 MG/DL
MICROALBUMIN/CREAT UR: 827 MG/G (ref 0–30)

## 2025-05-02 PROCEDURE — 99214 OFFICE O/P EST MOD 30 MIN: CPT | Performed by: FAMILY MEDICINE

## 2025-05-02 PROCEDURE — 82043 UR ALBUMIN QUANTITATIVE: CPT

## 2025-05-02 PROCEDURE — 3074F SYST BP LT 130 MM HG: CPT | Performed by: FAMILY MEDICINE

## 2025-05-02 PROCEDURE — 92250 FUNDUS PHOTOGRAPHY W/I&R: CPT | Mod: 26 | Performed by: FAMILY MEDICINE

## 2025-05-02 PROCEDURE — 82570 ASSAY OF URINE CREATININE: CPT

## 2025-05-02 PROCEDURE — 3078F DIAST BP <80 MM HG: CPT | Performed by: FAMILY MEDICINE

## 2025-05-02 ASSESSMENT — FIBROSIS 4 INDEX: FIB4 SCORE: 0.42

## 2025-05-02 NOTE — PROGRESS NOTES
Subjective:     CC:   Chief Complaint   Patient presents with    Follow-Up       HPI:   Olinda presents today for follow-up.  Patient was taking her metformin 2 tablets in the morning 1 in the evening but was having a lot of problems with stomach cramping so she stopped and only taking 2 in the morning.  Patient is still having some problems with stomach upset and some loose stools patient denies any black or bloody stools.  Patient feels that she is not hungry on the metformin.    Past Medical History:   Diagnosis Date    Labor abnormal 4/13/2017       Social History     Tobacco Use    Smoking status: Never    Smokeless tobacco: Never   Vaping Use    Vaping status: Never Used   Substance Use Topics    Alcohol use: No    Drug use: No       Current Outpatient Medications Ordered in Epic   Medication Sig Dispense Refill    fluconazole (DIFLUCAN) 150 MG tablet TAKE 1 TABLET BY MOUTH DAILY FOR 2 DAYS      metFORMIN ER (GLUCOPHAGE XR) 500 MG TABLET SR 24 HR 2 tablets in the morning and 1 tablet in the evening 270 Tablet 1    montelukast (SINGULAIR) 10 MG Tab Take 1 Tablet by mouth every day. 90 Tablet 0    Azelastine-Fluticasone 137-50 MCG/ACT Suspension One spray per nostril twice daily as needed for congestion. 23 g 0    allopurinol (ZYLOPRIM) 100 MG Tab Take 1 Tablet by mouth every day. 90 Tablet 0    lovastatin (MEVACOR) 10 MG tablet Take 1 Tablet by mouth every evening. 90 Tablet 1     No current Epic-ordered facility-administered medications on file.       Allergies:  Cefdinir    Health Maintenance: Completed    ROS:  Gen: no fevers/chills, patient has lost 1 pound in 6 weeks  Eyes: no changes in vision  ENT: no sore throat, no hearing loss, no bloody nose  Pulm: no sob, no cough  CV: no chest pain, no palpitations  GI: no nausea/vomiting, no diarrhea  : no dysuria  Neuro: no headaches, no numbness/tingling  Heme/Lymph: no easy bruising    Objective:     Exam:  /78 (BP Location: Right arm, Patient  "Position: Sitting, BP Cuff Size: Adult)   Pulse (!) 112   Temp 36.4 °C (97.5 °F)   Resp 16   Ht 1.575 m (5' 2\")   Wt 84.9 kg (187 lb 3.2 oz)   LMP 04/25/2025   SpO2 97%   BMI 34.24 kg/m²  Body mass index is 34.24 kg/m².    Gen: Alert and oriented, No apparent distress.  Skin: Warm and dry.  No obvious lesions.  Eyes: Sclera wnl Pupils normal in size  Lungs: Normal effort, CTA bilaterally, no wheezes, rhonchi, or rales  CV: Regular rate and rhythm. No murmurs, rubs, or gallops.  ABD: Soft non-tender no organomegaly  Musculoskeletal: Normal gait. No extremity cyanosis, clubbing, or edema.  Neuro: Oriented to person, place and time  Psych: Mood is wnl       Assessment & Plan:     39 y.o. female with the following -     1. Type 2 diabetes mellitus without complication, without long-term current use of insulin (HCC)  Patient's hemoglobin A1c is 7.9 patient unable to tolerate metformin we will write referral to pharmacotherapy to see how we can get her blood sugar under better control.  - Referral to Pharmacotherapy Service  - MICROALB/CREAT RATIO RAND. UR  - POCT Retinal Eye Exam    2. Loose stools  More than likely the metformin is causing the stool issue but we will have her do some stool collections also  - CALPROTECTIN,FECAL; Future  - CULTURE STOOL; Future       Return in about 4 weeks (around 5/30/2025), or if symptoms worsen or fail to improve.    Please note that this dictation was created using voice recognition software. I have made every reasonable attempt to correct obvious errors, but I expect that there are errors of grammar and possibly content that I did not discover before finalizing the note.  "

## 2025-05-06 LAB — RETINAL SCREEN: NEGATIVE

## 2025-05-21 ENCOUNTER — TELEPHONE (OUTPATIENT)
Dept: HEALTH INFORMATION MANAGEMENT | Facility: OTHER | Age: 40
End: 2025-05-21
Payer: COMMERCIAL

## 2025-05-27 RX ORDER — ALLOPURINOL 100 MG/1
100 TABLET ORAL DAILY
Qty: 90 TABLET | Refills: 0 | Status: SHIPPED | OUTPATIENT
Start: 2025-05-27

## 2025-05-27 RX ORDER — LOVASTATIN 10 MG/1
10 TABLET ORAL NIGHTLY
Qty: 90 TABLET | Refills: 1 | Status: SHIPPED | OUTPATIENT
Start: 2025-05-27

## 2025-06-12 ENCOUNTER — OFFICE VISIT (OUTPATIENT)
Dept: MEDICAL GROUP | Facility: PHYSICIAN GROUP | Age: 40
End: 2025-06-12
Payer: COMMERCIAL

## 2025-06-12 VITALS
OXYGEN SATURATION: 97 % | HEART RATE: 108 BPM | HEIGHT: 62 IN | WEIGHT: 188.2 LBS | SYSTOLIC BLOOD PRESSURE: 122 MMHG | RESPIRATION RATE: 16 BRPM | TEMPERATURE: 97.5 F | DIASTOLIC BLOOD PRESSURE: 74 MMHG | BODY MASS INDEX: 34.63 KG/M2

## 2025-06-12 DIAGNOSIS — R63.5 WEIGHT GAIN: ICD-10-CM

## 2025-06-12 DIAGNOSIS — E78.2 ELEVATED TRIGLYCERIDES WITH HIGH CHOLESTEROL: ICD-10-CM

## 2025-06-12 DIAGNOSIS — E11.9 CONTROLLED TYPE 2 DIABETES MELLITUS WITHOUT COMPLICATION, WITHOUT LONG-TERM CURRENT USE OF INSULIN (HCC): Primary | ICD-10-CM

## 2025-06-12 PROCEDURE — 3074F SYST BP LT 130 MM HG: CPT | Performed by: FAMILY MEDICINE

## 2025-06-12 PROCEDURE — 3078F DIAST BP <80 MM HG: CPT | Performed by: FAMILY MEDICINE

## 2025-06-12 PROCEDURE — 99214 OFFICE O/P EST MOD 30 MIN: CPT | Performed by: FAMILY MEDICINE

## 2025-06-12 ASSESSMENT — FIBROSIS 4 INDEX: FIB4 SCORE: 0.43

## 2025-06-12 NOTE — PROGRESS NOTES
"Subjective:     CC:   Chief Complaint   Patient presents with    Follow-Up       HPI:   Olinda presents today for follow-up.  Patient does have appointment pharmacotherapy coming up July 10.  Patient does work in [employer] would like to see if the pharmacotherapist here if possible.  Try to talk to him tomorrow and see if this is possible.  Patient back down her metformin and she states her stools are getting better she is not having as frequent issues.  Patient also states she has been decreasing the amount of soda she is drinking she feels she does not eat a lot of sugars but she does drink soda so she is decreasing the amount at this time.    Past Medical History[1]    Social History[2]    Current Medications and Prescriptions Ordered in Epic[3]    Allergies:  Cefdinir    Health Maintenance: Completed    ROS:  Gen: no fevers/chills, no changes in weight  Eyes: no changes in vision  ENT: no sore throat, no hearing loss, no bloody nose  Pulm: no sob, no cough  CV: no chest pain, no palpitations  GI: no nausea/vomiting, no diarrhea  : no dysuria  Neuro: no headaches, no numbness/tingling  Heme/Lymph: no easy bruising    Objective:     Exam:  /74 (BP Location: Left arm, Patient Position: Sitting, BP Cuff Size: Adult)   Pulse (!) 108   Temp 36.4 °C (97.5 °F)   Resp 16   Ht 1.575 m (5' 2\")   Wt 85.4 kg (188 lb 3.2 oz)   LMP 05/25/2025   SpO2 97%   BMI 34.42 kg/m²  Body mass index is 34.42 kg/m².    Gen: Alert and oriented, No apparent distress.  Skin: Warm and dry.  No obvious lesions.  Eyes: Sclera wnl Pupils normal in size  Lungs: Normal effort, CTA bilaterally, no wheezes, rhonchi, or rales  CV: Regular rate and rhythm. No murmurs, rubs, or gallops.  Musculoskeletal: Normal gait. No extremity cyanosis, clubbing, or edema.  Neuro: Oriented to person, place and time  Psych: Mood is wnl         Assessment & Plan:     40 y.o. female with the following -     1. Controlled type 2 diabetes mellitus without " complication, without long-term current use of insulin (HCC)  Continue patient on the present metformin we will see if it is possible for her to see the pharmacist here at this location.  - Comp Metabolic Panel; Future    2. Weight gain  Recommend checking her thyroid test next time  - TSH; Future    3. Elevated triglycerides with high cholesterol  Recommend checking her lipid panel  - Lipid Profile; Future       Return in about 2 months (around 8/12/2025), or if symptoms worsen or fail to improve.    Please note that this dictation was created using voice recognition software. I have made every reasonable attempt to correct obvious errors, but I expect that there are errors of grammar and possibly content that I did not discover before finalizing the note.         [1]   Past Medical History:  Diagnosis Date    Labor abnormal 4/13/2017   [2]   Social History  Tobacco Use    Smoking status: Never    Smokeless tobacco: Never   Vaping Use    Vaping status: Never Used   Substance Use Topics    Alcohol use: No    Drug use: No   [3]   Current Outpatient Medications Ordered in Epic   Medication Sig Dispense Refill    lovastatin (MEVACOR) 10 MG tablet Take 1 Tablet by mouth every evening. 90 Tablet 1    allopurinol (ZYLOPRIM) 100 MG Tab Take 1 Tablet by mouth every day. 90 Tablet 0    fluconazole (DIFLUCAN) 150 MG tablet TAKE 1 TABLET BY MOUTH DAILY FOR 2 DAYS      metFORMIN ER (GLUCOPHAGE XR) 500 MG TABLET SR 24 HR 2 tablets in the morning and 1 tablet in the evening 270 Tablet 1    montelukast (SINGULAIR) 10 MG Tab Take 1 Tablet by mouth every day. 90 Tablet 0    Azelastine-Fluticasone 137-50 MCG/ACT Suspension One spray per nostril twice daily as needed for congestion. 23 g 0     No current Epic-ordered facility-administered medications on file.

## 2025-07-10 ENCOUNTER — NON-PROVIDER VISIT (OUTPATIENT)
Dept: VASCULAR LAB | Facility: MEDICAL CENTER | Age: 40
End: 2025-07-10
Attending: INTERNAL MEDICINE
Payer: COMMERCIAL

## 2025-07-10 DIAGNOSIS — E11.9 CONTROLLED TYPE 2 DIABETES MELLITUS WITHOUT COMPLICATION, WITHOUT LONG-TERM CURRENT USE OF INSULIN (HCC): Primary | ICD-10-CM

## 2025-07-10 PROCEDURE — 99213 OFFICE O/P EST LOW 20 MIN: CPT

## 2025-07-10 RX ORDER — AVOBENZONE, HOMOSALATE, OCTISALATE, OCTOCRYLENE 30; 40; 45; 26 MG/ML; MG/ML; MG/ML; MG/ML
CREAM TOPICAL
Qty: 100 EACH | Refills: 3 | Status: SHIPPED | OUTPATIENT
Start: 2025-07-10

## 2025-07-10 RX ORDER — TIRZEPATIDE 2.5 MG/.5ML
2.5 INJECTION, SOLUTION SUBCUTANEOUS
Qty: 2 ML | Refills: 2 | Status: SHIPPED | OUTPATIENT
Start: 2025-07-10

## 2025-07-10 NOTE — PROGRESS NOTES
"Patient Consult Note    Primary care physician: Mable Alexis M.D.    Reason for Consult: Management of Uncontrolled Type 2 Diabetes    Date Referral Placed: 5/2/25    HPI:  Olinda Trevino is a 40 y.o. old patient who comes in today for evaluation of above stated problem.    Allergies  Cefdinir    Current Diabetes Medication Regimen  Metformin ER: 500 mg BID - unable to tolerate higher doses due to GI upset.     Previous Diabetes Medications and Reason for Discontinuation  Metformin ER - unable to tolerate higher doses due to GI upset.     Potential Barriers to Care:  Adherence: denies missed doses  Side effects: Occasional GI upset attributed to metformin  Affordability: No issues at this time    SMBG  Pt has home glucometer and proper testing technique - Yes    Pt reports blood sugars:   Has not been checking BG at home.    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     Dietary - 2-3 meals/day  Breakfast - varies, yogurt, \"quick stuff\" occasionally skips  Lunch - skips, yogurt, sandwich (keto bread)  Dinner - varies, protein w/ veggie, occasionally pizza  Snacks - doesn't really snack  Drinks - varies, colby (sparkling water), indulges in sodas (Boby or Dr. Pepper) has been trying to replace with Zero sugar options - potentially once per day, 12-20 oz depending on the day.    Labs  Lab Results   Component Value Date/Time    HBA1C 7.9 (H) 04/26/2025 10:55 AM    HBA1C 7.3 (H) 01/16/2025 07:46 AM    HBA1C 7.0 (H) 10/01/2024 09:39 AM      Lab Results   Component Value Date/Time    SODIUM 139 04/26/2025 10:55 AM    POTASSIUM 4.1 04/26/2025 10:55 AM    CHLORIDE 107 04/26/2025 10:55 AM    CO2 20 04/26/2025 10:55 AM    GLUCOSE 103 (H) 04/26/2025 10:55 AM    BUN 10 " 04/26/2025 10:55 AM    CREATININE 0.50 04/26/2025 10:55 AM     Lab Results   Component Value Date/Time    ALKPHOSPHAT 100 (H) 04/26/2025 10:55 AM    ASTSGOT 25 04/26/2025 10:55 AM    ALTSGPT 28 04/26/2025 10:55 AM    TBILIRUBIN 0.3 04/26/2025 10:55 AM    ALBUMIN 4.4 04/26/2025 10:55 AM      Lab Results   Component Value Date/Time    CHOLSTRLTOT 171 04/26/2025 10:55 AM     (H) 04/26/2025 10:55 AM    HDL 39 (A) 04/26/2025 10:55 AM    TRIGLYCERIDE 142 04/26/2025 10:55 AM       Lab Results   Component Value Date/Time    MALBCRT 827 (H) 05/02/2025 04:37 PM    MICROALBUR 143.0 05/02/2025 04:37 PM       Physical Examination:  Vital signs: LMP 05/25/2025  There is no height or weight on file to calculate BMI.    Assessment and Plan:    1. DM2  Basic physiology of DMII was explained to patient as well as microvascular/macrovascular complications. The importance of increasing physical activity to improve diabetes control was discussed with the patient. Patient was also educated on changing diet and making better choices to help control blood sugar.   Discussed Goals: FBG 80 - 130, 2hPP < 180, a1c < 7.0% initially, but would target <6.5% due to patient age.  Last a1c drawn on 4/26/25 was 7.9%, which is not at goal and has worsened since the previous reading  Pt reports that she does not regularly check FSBG values, and that her current meter is ~8 years old from when she had Gestational diabetes.    Pt reports that she is unable to tolerate doses of Metformin greater than 500 mg BID due to GI upset therefore will forego dose optimization of metformin.  Considering elevated UACR would recommend SGLT2i; however, patient states that she is not interested in initiation of an SGLT2i due to potential risk of UTIs.  Discussed GLP1RA therapy as another option, patient is open to trial of GLP1RA; however, she remains skeptical if she needs it or if she will be able to tolerate it. Patient states that she will think about  initiating it between now and follow-up appointment. Will prescribe at this time to initiate PA process to expedite ability to start therapy in the event that patient decides to start it.   Patient states that she knows what to eat but that sodas are her weakness, she feels that if she is able to limit sodas and increase her physical exercise that she would be able to get her A1c to goal without the need for additional prescription medications.     - Medication changes:  START Mounjaro 2.5 mg SQ weekly pending PA approval  Educated pt regarding MOA, SE's, strategies to mitigate SE's, as well as administration/storage recommendations.    Prescribed Blood Glucose meter/supplies  Requested that patient begin testing FBG ~3-4 times/week as well as occasional PPBG values  - Continue:  Metformin 500 mg BID     - Lifestyle changes:  Exercise Goal - Increase as able to goal of 30 minutes 5 days/week  Dietary Goal - Limit carbs and increase lean proteins and vegetable intake  Discussed the following goals:  Carbohydrate goals   No more than 45 grams/meal   No more than 15 grams/snack    - Preventative management:  REC DM Score: 1  Care gaps addressed:   LDL is above 100: Unable to discuss; will discuss at next visit  Care gaps updated in Health Maintenance    Follow Up:  1 months at [place] location per pt request    Trip Randolph, PharmD    CC:   Mable Alexis M.D.  Rx Coordinators

## 2025-07-10 NOTE — Clinical Note
Hi there, can we start the PA process for Mounjaro for this patient. Please let me know if you need anything else! Thank you!  Trip

## 2025-07-10 NOTE — PATIENT INSTRUCTIONS
Blood Sugar goals   Fasting goal: between  mg/dL (~100)   1-2 hours after meals goal: less than 180 mg/dL (~ less than 200)         Carbohydrate goals   No more than 45 grams/meal   No more than 15 grams/snack

## 2025-07-11 ENCOUNTER — TELEPHONE (OUTPATIENT)
Dept: VASCULAR LAB | Facility: MEDICAL CENTER | Age: 40
End: 2025-07-11
Payer: COMMERCIAL

## 2025-07-11 NOTE — TELEPHONE ENCOUNTER
Attempted to contact patient at [phone] to discuss [facility] and services/benefits offered. No answer, left voicemail.    ERNESTO Murphy, PhT  Vascular Pharmacy Liaison (Rx Coordinator)  P: [phone]  7/11/2025 10:25 AM

## 2025-07-11 NOTE — TELEPHONE ENCOUNTER
Prior Authorization for Tirzepatide (MOUNJARO) 2.5 MG/0.5ML Solution Auto-injector  (Quantity: 2 mls, Days: 28) has been submitted via Cover My Meds: Key ([id number])    Insurance: JIAN     Will follow up in 24-48 business hours.     ERNESTO Mruphy, PhT  Vascular Pharmacy Liaison (Rx Coordinator)  P: [phone]  7/11/2025 10:05 AM

## 2025-07-11 NOTE — TELEPHONE ENCOUNTER
Received New Start request via MSOT  for Tirzepatide (MOUNJARO) 2.5 MG/0.5ML Solution Auto-injector. (Quantity:2 mls, Day Supply:28)     Insurance: [employer]   Member ID:  [id number]  BIN: [id number]  PCN: [id number]  Group: [facility]     Ran Test claim via [employer] & medication Rejects stating prior authorization is required.    ERNESTO Murphy, PhT  Vascular Pharmacy Liaison (Rx Coordinator)  P: [phone]  7/11/2025 9:42 AM

## 2025-07-11 NOTE — TELEPHONE ENCOUNTER
New PA submitted for Tirzepatide (MOUNJARO) 2.5 MG/0.5ML Solution Auto-injector  has been approved for a quantity of 2 mls , day supply 28    PA reference number: [id number]  Insurance: [employer]  Effective dates: 07/11/2025-07/11/2026  Copay: $35     Is patient eligible to fill with [facility]? Yes    Next Steps: The Patients copay is less than $5.00. Will contact the patient to determine choice of pharmacy, if applicable.    ERNESTO Murphy, PhT  Vascular Pharmacy Liaison (Rx Coordinator)  P: [phone]  7/11/2025 10:22 AM

## 2025-07-11 NOTE — TELEPHONE ENCOUNTER
Prior Authorization for *** (Quantity: ***, Days: ***) has been submitted via {PAOptions:[id number]}    Insurance: ***    Will follow up in 24-48 business hours.

## 2025-07-14 ENCOUNTER — DOCUMENTATION (OUTPATIENT)
Dept: PHARMACY | Facility: MEDICAL CENTER | Age: 40
End: 2025-07-14
Payer: COMMERCIAL

## 2025-07-14 PROCEDURE — RXMED WILLOW AMBULATORY MEDICATION CHARGE

## 2025-07-14 NOTE — PROGRESS NOTES
**Pharmacist discussion**  Received note from Liaison requesting call to patient to discuss Mounjaro. Patient expressed hesitation with starting therapy due to concern for SEs. Reviewed SEs and mitigation strategies with patient. Reviewed MOA and benefits of therapy for blood sugar control. She voiced appreciation for the call and plans to consider starting therapy after having her A1C test re-taken by her provider. Provided clinical pharmacist line for additional questions/ concerns.

## 2025-07-14 NOTE — TELEPHONE ENCOUNTER
Pt had agreed to use our mail order and RXC services with [facility].     During our phone conversation, pt admitted that she wasn't sure if she would like to start with the medication or not at all, but pt would like to coordinate her first initial fill and requested to receive a follow up call in the next 3 weeks from her RXC if she had confirmed taking it or not.     Pt also opted in to receive a verbal RPH consult in regards to any general information about Mounjaro, and what would be the potential possible side effects while on it. Notified and updated pharmaco RPH's for the call request.     Scheduled pt's first initial delivery via EOD  7/15. Obtained pt's CC info, confirmed address and delivery instructions.     RXC contact information has been provided verbally.     ERNESTO Murphy, T  Vascular Pharmacy Liaison (Rx Coordinator)  P: [phone]  7/14/2025 9:17 AM

## 2025-07-15 ENCOUNTER — PHARMACY VISIT (OUTPATIENT)
Dept: PHARMACY | Facility: MEDICAL CENTER | Age: 40
End: 2025-07-15
Payer: COMMERCIAL

## 2025-07-15 NOTE — TELEPHONE ENCOUNTER
Called and s/w pt, she reports that she already received a call from Pamela Ospina, Rashmi and has no further questions at this time, but does state that she thinks she will hold off on starting it until a little bit later time.     Trip Randolph PharmD  Authorized Nuclear Pharmacist (ANP)

## 2025-08-02 ENCOUNTER — HOSPITAL ENCOUNTER (OUTPATIENT)
Dept: LAB | Facility: MEDICAL CENTER | Age: 40
End: 2025-08-02
Attending: FAMILY MEDICINE
Payer: COMMERCIAL

## 2025-08-02 DIAGNOSIS — E78.2 ELEVATED TRIGLYCERIDES WITH HIGH CHOLESTEROL: ICD-10-CM

## 2025-08-02 DIAGNOSIS — E11.9 CONTROLLED TYPE 2 DIABETES MELLITUS WITHOUT COMPLICATION, WITHOUT LONG-TERM CURRENT USE OF INSULIN (HCC): ICD-10-CM

## 2025-08-02 DIAGNOSIS — R63.5 WEIGHT GAIN: ICD-10-CM

## 2025-08-02 LAB
ALBUMIN SERPL BCP-MCNC: 4.5 G/DL (ref 3.2–4.9)
ALBUMIN/GLOB SERPL: 1.6 G/DL
ALP SERPL-CCNC: 68 U/L (ref 30–99)
ALT SERPL-CCNC: 19 U/L (ref 2–50)
ANION GAP SERPL CALC-SCNC: 12 MMOL/L (ref 7–16)
AST SERPL-CCNC: 18 U/L (ref 12–45)
BILIRUB SERPL-MCNC: 0.4 MG/DL (ref 0.1–1.5)
BUN SERPL-MCNC: 12 MG/DL (ref 8–22)
CALCIUM ALBUM COR SERPL-MCNC: 8.8 MG/DL (ref 8.5–10.5)
CALCIUM SERPL-MCNC: 9.2 MG/DL (ref 8.5–10.5)
CHLORIDE SERPL-SCNC: 102 MMOL/L (ref 96–112)
CHOLEST SERPL-MCNC: 218 MG/DL (ref 100–199)
CO2 SERPL-SCNC: 22 MMOL/L (ref 20–33)
CREAT SERPL-MCNC: 0.64 MG/DL (ref 0.5–1.4)
GFR SERPLBLD CREATININE-BSD FMLA CKD-EPI: 116 ML/MIN/1.73 M 2
GLOBULIN SER CALC-MCNC: 2.9 G/DL (ref 1.9–3.5)
GLUCOSE SERPL-MCNC: 127 MG/DL (ref 65–99)
HDLC SERPL-MCNC: 41 MG/DL
LDLC SERPL CALC-MCNC: 139 MG/DL
POTASSIUM SERPL-SCNC: 4.4 MMOL/L (ref 3.6–5.5)
PROT SERPL-MCNC: 7.4 G/DL (ref 6–8.2)
SODIUM SERPL-SCNC: 136 MMOL/L (ref 135–145)
TRIGL SERPL-MCNC: 189 MG/DL (ref 0–149)
TSH SERPL-ACNC: 0.78 UIU/ML (ref 0.38–5.33)

## 2025-08-02 PROCEDURE — 80061 LIPID PANEL: CPT

## 2025-08-02 PROCEDURE — 80053 COMPREHEN METABOLIC PANEL: CPT

## 2025-08-02 PROCEDURE — 36415 COLL VENOUS BLD VENIPUNCTURE: CPT

## 2025-08-02 PROCEDURE — 84443 ASSAY THYROID STIM HORMONE: CPT

## 2025-08-07 ENCOUNTER — SPECIALTY PHARMACY (OUTPATIENT)
Dept: VASCULAR LAB | Facility: MEDICAL CENTER | Age: 40
End: 2025-08-07

## 2025-08-07 ENCOUNTER — OFFICE VISIT (OUTPATIENT)
Dept: MEDICAL GROUP | Facility: PHYSICIAN GROUP | Age: 40
End: 2025-08-07
Payer: COMMERCIAL

## 2025-08-07 VITALS
WEIGHT: 190.2 LBS | HEART RATE: 107 BPM | RESPIRATION RATE: 16 BRPM | BODY MASS INDEX: 35 KG/M2 | SYSTOLIC BLOOD PRESSURE: 126 MMHG | HEIGHT: 62 IN | OXYGEN SATURATION: 97 % | DIASTOLIC BLOOD PRESSURE: 76 MMHG | TEMPERATURE: 98.7 F

## 2025-08-07 DIAGNOSIS — E11.9 CONTROLLED TYPE 2 DIABETES MELLITUS WITHOUT COMPLICATION, WITHOUT LONG-TERM CURRENT USE OF INSULIN (HCC): Primary | ICD-10-CM

## 2025-08-07 LAB
HBA1C MFR BLD: 6.7 % (ref ?–5.8)
POCT INT CON NEG: NEGATIVE
POCT INT CON POS: POSITIVE

## 2025-08-07 PROCEDURE — 3078F DIAST BP <80 MM HG: CPT | Performed by: FAMILY MEDICINE

## 2025-08-07 PROCEDURE — 3074F SYST BP LT 130 MM HG: CPT | Performed by: FAMILY MEDICINE

## 2025-08-07 PROCEDURE — 83036 HEMOGLOBIN GLYCOSYLATED A1C: CPT | Performed by: FAMILY MEDICINE

## 2025-08-07 PROCEDURE — 99214 OFFICE O/P EST MOD 30 MIN: CPT | Performed by: FAMILY MEDICINE

## 2025-08-07 RX ORDER — BLOOD-GLUCOSE METER
KIT MISCELLANEOUS
COMMUNITY
Start: 2025-07-10

## 2025-08-07 RX ORDER — BLOOD SUGAR DIAGNOSTIC
STRIP MISCELLANEOUS
COMMUNITY
Start: 2025-07-10

## 2025-08-07 ASSESSMENT — FIBROSIS 4 INDEX: FIB4 SCORE: 0.38

## 2025-08-15 ENCOUNTER — OFFICE VISIT (OUTPATIENT)
Dept: MEDICAL GROUP | Facility: PHYSICIAN GROUP | Age: 40
End: 2025-08-15
Payer: COMMERCIAL

## 2025-08-15 DIAGNOSIS — E11.9 CONTROLLED TYPE 2 DIABETES MELLITUS WITHOUT COMPLICATION, WITHOUT LONG-TERM CURRENT USE OF INSULIN (HCC): Primary | ICD-10-CM

## 2025-08-15 PROCEDURE — 99401 PREV MED CNSL INDIV APPRX 15: CPT | Performed by: PHARMACIST

## 2025-08-15 RX ORDER — ROSUVASTATIN CALCIUM 10 MG/1
10 TABLET, COATED ORAL EVERY EVENING
Qty: 100 TABLET | Refills: 3 | Status: SHIPPED | OUTPATIENT
Start: 2025-08-15 | End: 2026-09-19